# Patient Record
Sex: FEMALE | Race: WHITE | Employment: FULL TIME | URBAN - NONMETROPOLITAN AREA
[De-identification: names, ages, dates, MRNs, and addresses within clinical notes are randomized per-mention and may not be internally consistent; named-entity substitution may affect disease eponyms.]

---

## 2017-04-13 ENCOUNTER — OFFICE VISIT (OUTPATIENT)
Dept: INTERNAL MEDICINE | Age: 52
End: 2017-04-13
Payer: COMMERCIAL

## 2017-04-13 VITALS
WEIGHT: 172 LBS | SYSTOLIC BLOOD PRESSURE: 126 MMHG | BODY MASS INDEX: 29.37 KG/M2 | DIASTOLIC BLOOD PRESSURE: 72 MMHG | HEIGHT: 64 IN | HEART RATE: 84 BPM

## 2017-04-13 DIAGNOSIS — Z12.31 ENCOUNTER FOR MAMMOGRAM TO ESTABLISH BASELINE MAMMOGRAM: ICD-10-CM

## 2017-04-13 DIAGNOSIS — F33.1 MODERATE EPISODE OF RECURRENT MAJOR DEPRESSIVE DISORDER (HCC): Primary | ICD-10-CM

## 2017-04-13 DIAGNOSIS — Z99.89 OSA ON CPAP: ICD-10-CM

## 2017-04-13 DIAGNOSIS — D64.89 ANEMIA DUE TO OTHER CAUSE: ICD-10-CM

## 2017-04-13 DIAGNOSIS — E04.1 THYROID NODULE: ICD-10-CM

## 2017-04-13 DIAGNOSIS — G47.33 OSA ON CPAP: ICD-10-CM

## 2017-04-13 PROCEDURE — 1036F TOBACCO NON-USER: CPT | Performed by: INTERNAL MEDICINE

## 2017-04-13 PROCEDURE — G8427 DOCREV CUR MEDS BY ELIG CLIN: HCPCS | Performed by: INTERNAL MEDICINE

## 2017-04-13 PROCEDURE — 3014F SCREEN MAMMO DOC REV: CPT | Performed by: INTERNAL MEDICINE

## 2017-04-13 PROCEDURE — 3017F COLORECTAL CA SCREEN DOC REV: CPT | Performed by: INTERNAL MEDICINE

## 2017-04-13 PROCEDURE — G8419 CALC BMI OUT NRM PARAM NOF/U: HCPCS | Performed by: INTERNAL MEDICINE

## 2017-04-13 PROCEDURE — 99214 OFFICE O/P EST MOD 30 MIN: CPT | Performed by: INTERNAL MEDICINE

## 2017-04-13 RX ORDER — SERTRALINE HYDROCHLORIDE 100 MG/1
100 TABLET, FILM COATED ORAL DAILY
Qty: 90 TABLET | Refills: 3 | Status: SHIPPED | OUTPATIENT
Start: 2017-04-13 | End: 2018-04-30 | Stop reason: SDUPTHER

## 2017-04-13 ASSESSMENT — ENCOUNTER SYMPTOMS
DOUBLE VISION: 0
NAUSEA: 0
BLURRED VISION: 0
DIARRHEA: 0
CONSTIPATION: 0
WHEEZING: 0
ABDOMINAL PAIN: 0
EYE PAIN: 0
BLOOD IN STOOL: 0
EYE DISCHARGE: 0
SHORTNESS OF BREATH: 0
COUGH: 0
SORE THROAT: 0
VOMITING: 0

## 2017-04-13 ASSESSMENT — PATIENT HEALTH QUESTIONNAIRE - PHQ9
1. LITTLE INTEREST OR PLEASURE IN DOING THINGS: 0
SUM OF ALL RESPONSES TO PHQ QUESTIONS 1-9: 1
SUM OF ALL RESPONSES TO PHQ9 QUESTIONS 1 & 2: 1
2. FEELING DOWN, DEPRESSED OR HOPELESS: 1

## 2017-05-01 ENCOUNTER — TELEPHONE (OUTPATIENT)
Dept: GENERAL RADIOLOGY | Age: 52
End: 2017-05-01

## 2017-05-01 DIAGNOSIS — R92.8 ABNORMAL MAMMOGRAM: Primary | ICD-10-CM

## 2017-06-28 ENCOUNTER — OFFICE VISIT (OUTPATIENT)
Dept: PULMONOLOGY | Age: 52
End: 2017-06-28
Payer: COMMERCIAL

## 2017-06-28 VITALS
RESPIRATION RATE: 18 BRPM | BODY MASS INDEX: 28.34 KG/M2 | OXYGEN SATURATION: 98 % | HEIGHT: 64 IN | WEIGHT: 166 LBS | DIASTOLIC BLOOD PRESSURE: 80 MMHG | SYSTOLIC BLOOD PRESSURE: 114 MMHG | HEART RATE: 81 BPM

## 2017-06-28 DIAGNOSIS — Z99.89 OSA ON CPAP: Primary | ICD-10-CM

## 2017-06-28 DIAGNOSIS — K21.9 GASTROESOPHAGEAL REFLUX DISEASE WITHOUT ESOPHAGITIS: ICD-10-CM

## 2017-06-28 DIAGNOSIS — G47.33 OSA ON CPAP: Primary | ICD-10-CM

## 2017-06-28 PROCEDURE — 1036F TOBACCO NON-USER: CPT | Performed by: INTERNAL MEDICINE

## 2017-06-28 PROCEDURE — G8419 CALC BMI OUT NRM PARAM NOF/U: HCPCS | Performed by: INTERNAL MEDICINE

## 2017-06-28 PROCEDURE — G8427 DOCREV CUR MEDS BY ELIG CLIN: HCPCS | Performed by: INTERNAL MEDICINE

## 2017-06-28 PROCEDURE — 3017F COLORECTAL CA SCREEN DOC REV: CPT | Performed by: INTERNAL MEDICINE

## 2017-06-28 PROCEDURE — 3014F SCREEN MAMMO DOC REV: CPT | Performed by: INTERNAL MEDICINE

## 2017-06-28 PROCEDURE — 99204 OFFICE O/P NEW MOD 45 MIN: CPT | Performed by: INTERNAL MEDICINE

## 2017-06-28 RX ORDER — OMEPRAZOLE 20 MG/1
20 CAPSULE, DELAYED RELEASE ORAL DAILY
Qty: 30 CAPSULE | Refills: 2 | Status: SHIPPED | OUTPATIENT
Start: 2017-06-28 | End: 2017-11-22 | Stop reason: SDUPTHER

## 2017-07-21 ENCOUNTER — OFFICE VISIT (OUTPATIENT)
Dept: INTERNAL MEDICINE | Age: 52
End: 2017-07-21
Payer: COMMERCIAL

## 2017-07-21 VITALS
HEIGHT: 64 IN | DIASTOLIC BLOOD PRESSURE: 68 MMHG | HEART RATE: 60 BPM | WEIGHT: 170.6 LBS | SYSTOLIC BLOOD PRESSURE: 120 MMHG | BODY MASS INDEX: 29.12 KG/M2

## 2017-07-21 DIAGNOSIS — K21.9 GASTROESOPHAGEAL REFLUX DISEASE WITHOUT ESOPHAGITIS: ICD-10-CM

## 2017-07-21 DIAGNOSIS — E04.1 THYROID NODULE: Primary | ICD-10-CM

## 2017-07-21 DIAGNOSIS — G47.33 OSA ON CPAP: ICD-10-CM

## 2017-07-21 DIAGNOSIS — F33.1 MODERATE EPISODE OF RECURRENT MAJOR DEPRESSIVE DISORDER (HCC): ICD-10-CM

## 2017-07-21 DIAGNOSIS — Z99.89 OSA ON CPAP: ICD-10-CM

## 2017-07-21 PROCEDURE — G8419 CALC BMI OUT NRM PARAM NOF/U: HCPCS | Performed by: INTERNAL MEDICINE

## 2017-07-21 PROCEDURE — 3017F COLORECTAL CA SCREEN DOC REV: CPT | Performed by: INTERNAL MEDICINE

## 2017-07-21 PROCEDURE — 99213 OFFICE O/P EST LOW 20 MIN: CPT | Performed by: INTERNAL MEDICINE

## 2017-07-21 PROCEDURE — 3014F SCREEN MAMMO DOC REV: CPT | Performed by: INTERNAL MEDICINE

## 2017-07-21 PROCEDURE — 1036F TOBACCO NON-USER: CPT | Performed by: INTERNAL MEDICINE

## 2017-07-21 PROCEDURE — G8427 DOCREV CUR MEDS BY ELIG CLIN: HCPCS | Performed by: INTERNAL MEDICINE

## 2017-07-21 RX ORDER — TIZANIDINE 4 MG/1
4 TABLET ORAL 3 TIMES DAILY PRN
Qty: 90 TABLET | Refills: 1 | Status: SHIPPED | OUTPATIENT
Start: 2017-07-21 | End: 2017-11-22

## 2017-07-23 ASSESSMENT — ENCOUNTER SYMPTOMS
SHORTNESS OF BREATH: 0
NAUSEA: 0
DIARRHEA: 0
EYE PAIN: 0
VOMITING: 0
ABDOMINAL PAIN: 0
BLOOD IN STOOL: 0
SORE THROAT: 0
WHEEZING: 0
BLURRED VISION: 0
COUGH: 0
CONSTIPATION: 0
DOUBLE VISION: 0
EYE DISCHARGE: 0

## 2017-10-24 ENCOUNTER — OFFICE VISIT (OUTPATIENT)
Dept: PULMONOLOGY | Age: 52
End: 2017-10-24
Payer: COMMERCIAL

## 2017-10-24 VITALS
BODY MASS INDEX: 28.71 KG/M2 | WEIGHT: 168.2 LBS | HEART RATE: 70 BPM | SYSTOLIC BLOOD PRESSURE: 120 MMHG | OXYGEN SATURATION: 98 % | RESPIRATION RATE: 14 BRPM | HEIGHT: 64 IN | DIASTOLIC BLOOD PRESSURE: 88 MMHG | TEMPERATURE: 97.8 F

## 2017-10-24 DIAGNOSIS — G47.33 OSA ON CPAP: Primary | ICD-10-CM

## 2017-10-24 DIAGNOSIS — G47.19 EXCESSIVE DAYTIME SLEEPINESS: ICD-10-CM

## 2017-10-24 DIAGNOSIS — Z99.89 OSA ON CPAP: Primary | ICD-10-CM

## 2017-10-24 DIAGNOSIS — Z28.21 REFUSED INFLUENZA VACCINE: ICD-10-CM

## 2017-10-24 PROCEDURE — G8427 DOCREV CUR MEDS BY ELIG CLIN: HCPCS | Performed by: INTERNAL MEDICINE

## 2017-10-24 PROCEDURE — 3014F SCREEN MAMMO DOC REV: CPT | Performed by: INTERNAL MEDICINE

## 2017-10-24 PROCEDURE — G8484 FLU IMMUNIZE NO ADMIN: HCPCS | Performed by: INTERNAL MEDICINE

## 2017-10-24 PROCEDURE — 99214 OFFICE O/P EST MOD 30 MIN: CPT | Performed by: INTERNAL MEDICINE

## 2017-10-24 PROCEDURE — 3017F COLORECTAL CA SCREEN DOC REV: CPT | Performed by: INTERNAL MEDICINE

## 2017-10-24 PROCEDURE — 1036F TOBACCO NON-USER: CPT | Performed by: INTERNAL MEDICINE

## 2017-10-24 PROCEDURE — G8417 CALC BMI ABV UP PARAM F/U: HCPCS | Performed by: INTERNAL MEDICINE

## 2017-10-24 ASSESSMENT — SLEEP AND FATIGUE QUESTIONNAIRES
HOW LIKELY ARE YOU TO NOD OFF OR FALL ASLEEP WHILE WATCHING TV: 1
HOW LIKELY ARE YOU TO NOD OFF OR FALL ASLEEP WHEN YOU ARE A PASSENGER IN A CAR FOR AN HOUR WITHOUT A BREAK: 2
HOW LIKELY ARE YOU TO NOD OFF OR FALL ASLEEP WHILE LYING DOWN TO REST IN THE AFTERNOON WHEN CIRCUMSTANCES PERMIT: 1
HOW LIKELY ARE YOU TO NOD OFF OR FALL ASLEEP WHILE SITTING QUIETLY AFTER LUNCH WITHOUT ALCOHOL: 0
HOW LIKELY ARE YOU TO NOD OFF OR FALL ASLEEP WHILE SITTING AND READING: 1
HOW LIKELY ARE YOU TO NOD OFF OR FALL ASLEEP WHILE SITTING AND TALKING TO SOMEONE: 0
ESS TOTAL SCORE: 8
HOW LIKELY ARE YOU TO NOD OFF OR FALL ASLEEP WHILE SITTING INACTIVE IN A PUBLIC PLACE: 0
HOW LIKELY ARE YOU TO NOD OFF OR FALL ASLEEP IN A CAR, WHILE STOPPED FOR A FEW MINUTES IN TRAFFIC: 3

## 2017-10-24 NOTE — LETTER
[address]  Phone: [phone]  Fax: [phone]    Dequan Crisostomo MD        October 24, 2017     Jennifer Lipscomb MD  [address] [place] [address]    Patient: Fabrice Bazzi  MR Number: [id number]  YOB: 1965  Date of Visit: 10/24/2017    Dear Dr. Jennifer Lipscomb: Thank you for the request for consultation for Manasa Bryson to me for the evaluation of ***. Below are the relevant portions of my assessment and plan of care. If you have questions, please do not hesitate to call me. I look forward to following Rudolph Welsh along with you.     Sincerely,        Dequan Crisostomo MD

## 2017-10-24 NOTE — PROGRESS NOTES
REASON FOR THE CONSULTATION:  montez   HISTORY OF PRESENT ILLNESS:    Nba Starks is a 45y.o. year old female here for  Patient denies any aerophagia. Today, he has tolerated the CPAP at 8 cm of water pressure. Compliance data from 9/23/2017.-10/22/2017 shows 93.3% compliance and AHI is 4.9. She does feel fatigued and tired during the day, particularly while driving. She takes Zanaflex at night, but tells me that she only takes this intermittently. She is also on Zoloft 100 mg for depression. She gets up once at night to go to the bathroom. Her sleep time is 10 or 11 PM and wake time is 6 AM.  Sleep Medicine 10/24/2017   Sitting and reading 1   Watching TV 1   Sitting, inactive in a public place (e.g. a theatre or a meeting) 0   As a passenger in a car for an hour without a break 2   Lying down to rest in the afternoon when circumstances permit 1   Sitting and talking to someone 0   Sitting quietly after a lunch without alcohol 0   In a car, while stopped for a few minutes in traffic 3   Total score 8                Previous visit   evaluation of evaluation of MONTEZ. Patient's initial sleep study was done 11/1/2016 and was found to have an AHI of 17.8. She was titrated to a CPAP of 12 cm of water pressure with a nasal pillow. Even during the titration night. Patient noticed that she was bloated, had lot of distention of her abdomen and passing gas. At that time she was having a full face mask. Since December 7, 2016 when she had her titration night. Patient has been using the CPAP. Her provider is [employer]. Compliance is 76.7% age. Average usage is 4 hours and 49 minutes. This is set at a ramp the pressure setting is 5 cm of water at that time. Start pressure  She still complains of excessive bloating aerophagia and passing gas.   Has tried different strategies without success        She had the sleep study because she was tired during the day and was falling asleep at the week since ABLATION  12/17/13    HYSTEROSCOPY  7/2/13    Dr Baron Moe    HYSTEROSCOPY  12/17/13    WISDOM TOOTH EXTRACTION             SOCIAL AND OCCUPATIONAL HEALTH:      There  no history of TB or TB exposure. There  no asbestos or silica dust exposure. The patient reports  no coal, foundry, quarry or [employer] exposure. Travel history reveals no. There  no history of recreational or IV drug use. There  no hot tube exposure. Pets  no    Occupational history administered a persistent    TOBACCO:   reports that she has never smoked. She has never used smokeless tobacco.  ETOH:   reports that she drinks alcohol. ALLERGIES:      Allergies   Allergen Reactions    Aspirin Rash    Penicillins Swelling and Rash         Home Meds:   Prior to Admission medications    Medication Sig Start Date End Date Taking? Authorizing Provider   omeprazole (PRILOSEC) 20 MG delayed release capsule Take 1 capsule by mouth Daily 6/28/17  Yes Elin Boles MD   sertraline (ZOLOFT) 100 MG tablet Take 1 tablet by mouth daily 4/13/17  Yes Juan M Tellez MD   Multiple Vitamins-Minerals (MULTIVITAL PO) Take 1 tablet by mouth daily as needed    Yes Historical Provider, MD   Cholecalciferol (VITAMIN D) 2000 UNITS CAPS capsule Take 2,000 Units by mouth daily    Yes Historical Provider, MD   tiZANidine (ZANAFLEX) 4 MG tablet Take 1 tablet by mouth 3 times daily as needed (muscle spasms) 7/21/17   Juan M Tellez MD          Review of Systems -  General ROS: negative for - chills, fatigue, fever or weight loss  ENT ROS: negative for - headaches, oral lesions or sore throat  Cardiovascular ROS: no chest pain , orthopnea or pnd   Gastrointestinal ROS: no abdominal pain, change in bowel habits, or black or bloody stools  Skin - no rash   Neuro - no blurry vision , no loc .  No focal weakness   msk - no jt tenderness or swelling    Vascular - no claudication , rest completed and negative   Lymphatic - complete and negative   Hematology detail  Follow-up in 3 months with compliance date    Requested Prescriptions      No prescriptions requested or ordered in this encounter       There are no discontinued medications. Serjio Davis received counseling on the following healthy behaviors: nutrition, exercise and medication adherence            Discussed use, benefit, and side effects of prescribed medications. Barriers to medication compliance addressed. All patient questions answered. Pt voiced understanding. I hope this updates you on my evaluation and clinical thinking. Thank you for allowing me to participate in his care. Sincerely,      Raphael Crisostomo MD       Please note that this chart was generated using voice recognition Dragon dictation software. Although every effort was made to ensure the accuracy of this automated transcription, some errors in transcription may have occurred.

## 2017-11-22 ENCOUNTER — OFFICE VISIT (OUTPATIENT)
Dept: OBGYN | Age: 52
End: 2017-11-22
Payer: COMMERCIAL

## 2017-11-22 ENCOUNTER — HOSPITAL ENCOUNTER (OUTPATIENT)
Age: 52
Setting detail: SPECIMEN
Discharge: HOME | End: 2017-11-22
Payer: COMMERCIAL

## 2017-11-22 ENCOUNTER — OFFICE VISIT (OUTPATIENT)
Dept: INTERNAL MEDICINE | Age: 52
End: 2017-11-22
Payer: COMMERCIAL

## 2017-11-22 VITALS
HEART RATE: 80 BPM | BODY MASS INDEX: 28.51 KG/M2 | RESPIRATION RATE: 16 BRPM | DIASTOLIC BLOOD PRESSURE: 64 MMHG | WEIGHT: 167 LBS | HEIGHT: 64 IN | SYSTOLIC BLOOD PRESSURE: 112 MMHG

## 2017-11-22 VITALS
BODY MASS INDEX: 28.51 KG/M2 | SYSTOLIC BLOOD PRESSURE: 116 MMHG | HEART RATE: 84 BPM | WEIGHT: 167 LBS | HEIGHT: 64 IN | DIASTOLIC BLOOD PRESSURE: 76 MMHG

## 2017-11-22 DIAGNOSIS — Z01.419 WELL WOMAN EXAM WITH ROUTINE GYNECOLOGICAL EXAM: Primary | ICD-10-CM

## 2017-11-22 DIAGNOSIS — E04.1 THYROID NODULE: ICD-10-CM

## 2017-11-22 DIAGNOSIS — K21.9 GASTROESOPHAGEAL REFLUX DISEASE WITHOUT ESOPHAGITIS: ICD-10-CM

## 2017-11-22 DIAGNOSIS — F33.1 MODERATE EPISODE OF RECURRENT MAJOR DEPRESSIVE DISORDER (HCC): ICD-10-CM

## 2017-11-22 DIAGNOSIS — Z99.89 OSA ON CPAP: Primary | ICD-10-CM

## 2017-11-22 DIAGNOSIS — H61.91 SKIN LESION OF RIGHT EAR: ICD-10-CM

## 2017-11-22 DIAGNOSIS — Z01.419 WELL WOMAN EXAM WITH ROUTINE GYNECOLOGICAL EXAM: ICD-10-CM

## 2017-11-22 DIAGNOSIS — G47.33 OSA ON CPAP: Primary | ICD-10-CM

## 2017-11-22 PROCEDURE — 3014F SCREEN MAMMO DOC REV: CPT | Performed by: OBSTETRICS & GYNECOLOGY

## 2017-11-22 PROCEDURE — 3017F COLORECTAL CA SCREEN DOC REV: CPT | Performed by: OBSTETRICS & GYNECOLOGY

## 2017-11-22 PROCEDURE — 99396 PREV VISIT EST AGE 40-64: CPT | Performed by: OBSTETRICS & GYNECOLOGY

## 2017-11-22 PROCEDURE — G8427 DOCREV CUR MEDS BY ELIG CLIN: HCPCS | Performed by: OBSTETRICS & GYNECOLOGY

## 2017-11-22 PROCEDURE — 3014F SCREEN MAMMO DOC REV: CPT | Performed by: INTERNAL MEDICINE

## 2017-11-22 PROCEDURE — 1036F TOBACCO NON-USER: CPT | Performed by: INTERNAL MEDICINE

## 2017-11-22 PROCEDURE — 99214 OFFICE O/P EST MOD 30 MIN: CPT | Performed by: INTERNAL MEDICINE

## 2017-11-22 PROCEDURE — G0145 SCR C/V CYTO,THINLAYER,RESCR: HCPCS

## 2017-11-22 PROCEDURE — G8417 CALC BMI ABV UP PARAM F/U: HCPCS | Performed by: OBSTETRICS & GYNECOLOGY

## 2017-11-22 PROCEDURE — G8484 FLU IMMUNIZE NO ADMIN: HCPCS | Performed by: OBSTETRICS & GYNECOLOGY

## 2017-11-22 PROCEDURE — 3017F COLORECTAL CA SCREEN DOC REV: CPT | Performed by: INTERNAL MEDICINE

## 2017-11-22 PROCEDURE — G8427 DOCREV CUR MEDS BY ELIG CLIN: HCPCS | Performed by: INTERNAL MEDICINE

## 2017-11-22 PROCEDURE — 1036F TOBACCO NON-USER: CPT | Performed by: OBSTETRICS & GYNECOLOGY

## 2017-11-22 PROCEDURE — G8417 CALC BMI ABV UP PARAM F/U: HCPCS | Performed by: INTERNAL MEDICINE

## 2017-11-22 PROCEDURE — G8484 FLU IMMUNIZE NO ADMIN: HCPCS | Performed by: INTERNAL MEDICINE

## 2017-11-22 RX ORDER — MEDROXYPROGESTERONE ACETATE 2.5 MG/1
2.5 TABLET ORAL DAILY
Qty: 30 TABLET | Refills: 11 | Status: SHIPPED | OUTPATIENT
Start: 2017-11-22 | End: 2018-11-29 | Stop reason: SDUPTHER

## 2017-11-22 RX ORDER — ESTRADIOL 1 MG/1
1 TABLET ORAL DAILY
Qty: 30 TABLET | Refills: 11 | Status: SHIPPED | OUTPATIENT
Start: 2017-11-22 | End: 2018-11-29 | Stop reason: SDUPTHER

## 2017-11-22 ASSESSMENT — ENCOUNTER SYMPTOMS
EYES NEGATIVE: 1
RESPIRATORY NEGATIVE: 1

## 2017-11-22 NOTE — PROGRESS NOTES
Subjective:      Patient ID: Su Goldsmith is a 45 y.o. female. HPI  For yearly exam.  Has been having hot flashes and night sweats with assoc. Fatigue  For last yr. Hx of endometrial ablation in 2013 and hasnt bled since. Wants to discuss HRT. Hx of cryosurgery when young but all PAPs since have been normal  Had normal mammogram this spring. Review of Systems   Constitutional: Positive for fatigue. HENT: Negative. Eyes: Negative. Respiratory: Negative. Uses CPAP for apnea. Cardiovascular: Negative. Gastrointestinal:        Chronic gasiness   Endocrine: Negative. Genitourinary: Negative. Musculoskeletal: Negative. Neurological: Negative. Hematological: Negative. Psychiatric/Behavioral: Negative. Objective:   Physical Exam   Constitutional: She is oriented to person, place, and time. She appears well-developed. HENT:   Head: Normocephalic. Eyes: Pupils are equal, round, and reactive to light. Neck: Neck supple. No thyromegaly present. Cardiovascular: Normal rate, regular rhythm and normal heart sounds. No murmur heard. Pulmonary/Chest: Effort normal and breath sounds normal. She has no wheezes. She has no rales. Abdominal: Soft. She exhibits no mass. There is no tenderness. Genitourinary:   Genitourinary Comments: Breasts without mass or discharge. Normal perineum and vagina. Cervix without gross lesion. Os stenotic and S/C junction not seen  Uterus nl anteflexed. No mass or tenderness. Musculoskeletal: Normal range of motion. Lymphadenopathy:     She has no cervical adenopathy. Neurological: She is alert and oriented to person, place, and time. Skin: Skin is warm and dry. Psychiatric: She has a normal mood and affect. Assessment:      Symptomatic menopause      Plan:      Discussed pros and cons of HRT. Pt wants to try.   Estrace 1/Provera 2.5 daily

## 2017-11-24 RX ORDER — OMEPRAZOLE 20 MG/1
20 CAPSULE, DELAYED RELEASE ORAL DAILY
Qty: 90 CAPSULE | Refills: 3 | Status: SHIPPED | OUTPATIENT
Start: 2017-11-24 | End: 2018-12-19 | Stop reason: SDUPTHER

## 2017-11-24 NOTE — PROGRESS NOTES
Susan Prado  YOB: 1965    Date of Service:  11/22/2017      HPI:  Anel Franco was seen in the internal medicine office today for   Chief Complaint   Patient presents with    Sleep Apnea     4 mo follow up    Fatigue      · and continued evaluation and management of chronic medical problems. We reviewed the thyroid nodule issue and the ultrasound from July which looks stable. She has not had any symptoms here. The fatigue and sleep apnea have been a bigger issue and Dr. Magan Chaudhry is actively managing this. He had her stop her Zanaflex, slightly increase the CPAP and she has short-term followup. The mood has been reasonably good with the SSRI. She does have some interest potentially in seeing a counselor up in [facility] which I think would probably be helpful. With the reflux she uses the Prilosec through Pulmonary and has done well there. She has a new concern of a lesion on her right ear that she would like to have looked at. It is a little sensitive and slightly red. Review of Systems:  Constitutional:  Negative for chills, fever, and weight loss. Positive for fatigue. HENT:  Negative for congestion, ear pain, and sore throat. Eyes:  Negative for blurred vision, double vision, pain and discharge. Respiratory:  Negative for cough, shortness of breath, and wheezing. Cardiovascular:  Negative for chest pain, palpitations, and PND. Gastrointestinal:  Negative for abdominal pain, blood in stool, constipation, diarrhea, nausea and vomiting. Genitourinary:  Negative for dysuria, frequency, and hematuria. Musculoskeletal:  Negative for myalgias. Skin:  Negative for rash. Neurological:  Negative for tingling, sensory change, speech change, focal weakness, seizures, and headaches. Endo/Heme/Allergies:  Does not bruise/bleed easily. Psychiatric/Behavioral:  Negative for hallucinations and suicidal ideas.     Patient Active Problem List   Diagnosis    father. History   Smoking Status    Never Smoker   Smokeless Tobacco    Never Used     History   Alcohol Use    0.0 oz/week     Comment: socially       Physical Examination:  Constitutional:  She appears well-developed and well-nourished. No distress. HENT:  Head: Normocephalic and atraumatic. Right Ear:  External ear normal.  Left Ear:  External ear normal.  Nose:  Nose normal.  Mouth/Throat:  Oropharynx is clear and moist.  Eyes: Conjunctivae and EOM are normal.  Pupils are equal, round, and reactive to light. Right eye exhibits no discharge. Left eye exhibits no discharge. No scleral icterus. Neck:  Normal range of motion. Neck supple. No JVD present. No tracheal deviation present. No thyromegaly present. Cardiovascular:  Normal rate, normal heart sounds, and intact distal pulses. Exam reveals no gallop. Pulmonary/Chest:  Effort normal and breath sounds normal.  No respiratory distress. She has no wheezes. She has no rales. Abdominal:  Soft. Normal aorta and bowel sounds are normal.  She exhibits no distension and no mass. There is no hepatosplenomegaly. There is no tenderness. There is no rebound and no guarding. Musculoskeletal:  She exhibits no edema or tenderness. Lymphadenopathy:    She has no cervical adenopathy. Neurological:  She is alert. She has normal strength. She displays normal reflexes. No cranial nerve deficit or sensory deficit. She exhibits normal muscle tone. Skin:  Skin is warm and dry. No rash noted. She is not diaphoretic. No pallor. Slight area of erythema and scale at the pinna of the right ear. Psychiatric:  She has a normal mood and affect. Her behavior is normal.  Judgment normal.  Vitals reviewed. Vitals:    11/22/17 1451   BP: 116/76   Site: Left Arm   Position: Sitting   Cuff Size: Large Adult   Pulse: 84   Weight: 167 lb (75.8 kg)   Height: 5' 4\" (1.626 m)     Body mass index is 28.67 kg/m².      Wt Readings from Last 3 Encounters:   11/22/17 167 lb (75.8 kg)   11/22/17 167 lb (75.8 kg)   10/24/17 168 lb 3.2 oz (76.3 kg)     BP Readings from Last 3 Encounters:   11/22/17 116/76   11/22/17 112/64   10/24/17 120/88         Lab Results   Component Value Date    K 4.2 10/17/2015    CREATININE 0.72 10/17/2015    AST 15 10/17/2015    ALT 14 10/17/2015    TSH 1.73 09/26/2016    HCT 39.4 04/08/2017    GLUCOSE 98 04/08/2017    MG 2.2 08/17/2013    CALCIUM 10.3 10/17/2015      Lab Results   Component Value Date    CHOL 139 04/23/2016    TRIG 112 04/23/2016    HDL 64 04/23/2016    LDLCHOLESTEROL 53 04/23/2016       Assessment/Plan:    1. YOJANA on CPAP  She has increased her CPAP to 9. She stopped the Zanaflex. Will follow up with Pulmonary. 2. Thyroid nodule  Ultrasound reviewed from July 2017.  4 x 6 x 8 mm nodule noted. Will continue to monitor. Stable. 3. Moderate episode of recurrent major depressive disorder (HCC)  Doing well on the SSRI. No change. She is interested in potentially a referral to a counselor in [facility] and we will see if we can locate someone for her. This is a little outside of our normal range of referral but we will see what we can come up with for her up there. 4. Gastroesophageal reflux disease without esophagitis  Doing fine there with the Prilosec. This was initially started by Pulmonary. - omeprazole (PRILOSEC) 20 MG delayed release capsule; Take 1 capsule by mouth Daily  Dispense: 90 capsule; Refill: 3    5. Skin lesion of right ear  Dermatology referral.    She will call if any problems prior to return.     Orders Placed This Encounter   Medications    omeprazole (PRILOSEC) 20 MG delayed release capsule     Sig: Take 1 capsule by mouth Daily     Dispense:  90 capsule     Refill:  3        Troy BOWMAN am scribing for Sami Mosher MD 11/24/2017 at 1:03 PM.

## 2017-11-30 LAB — CYTOLOGY REPORT: NORMAL

## 2017-12-13 ENCOUNTER — OFFICE VISIT (OUTPATIENT)
Dept: PULMONOLOGY | Age: 52
End: 2017-12-13
Payer: COMMERCIAL

## 2017-12-13 VITALS
OXYGEN SATURATION: 98 % | WEIGHT: 169.6 LBS | RESPIRATION RATE: 16 BRPM | HEART RATE: 78 BPM | DIASTOLIC BLOOD PRESSURE: 70 MMHG | TEMPERATURE: 98.3 F | BODY MASS INDEX: 28.95 KG/M2 | HEIGHT: 64 IN | SYSTOLIC BLOOD PRESSURE: 106 MMHG

## 2017-12-13 DIAGNOSIS — G47.33 OSA ON CPAP: Primary | ICD-10-CM

## 2017-12-13 DIAGNOSIS — Z99.89 OSA ON CPAP: Primary | ICD-10-CM

## 2017-12-13 DIAGNOSIS — G47.19 EXCESSIVE DAYTIME SLEEPINESS: ICD-10-CM

## 2017-12-13 PROCEDURE — 3014F SCREEN MAMMO DOC REV: CPT | Performed by: NURSE PRACTITIONER

## 2017-12-13 PROCEDURE — G8484 FLU IMMUNIZE NO ADMIN: HCPCS | Performed by: NURSE PRACTITIONER

## 2017-12-13 PROCEDURE — 99213 OFFICE O/P EST LOW 20 MIN: CPT | Performed by: NURSE PRACTITIONER

## 2017-12-13 PROCEDURE — 1036F TOBACCO NON-USER: CPT | Performed by: NURSE PRACTITIONER

## 2017-12-13 PROCEDURE — G8417 CALC BMI ABV UP PARAM F/U: HCPCS | Performed by: NURSE PRACTITIONER

## 2017-12-13 PROCEDURE — G8427 DOCREV CUR MEDS BY ELIG CLIN: HCPCS | Performed by: NURSE PRACTITIONER

## 2017-12-13 PROCEDURE — 3017F COLORECTAL CA SCREEN DOC REV: CPT | Performed by: NURSE PRACTITIONER

## 2017-12-13 NOTE — PROGRESS NOTES
night.  Use humidifier if needed. Weight loss was recommended and discussed. Recommended following good sleep hygiene instructions. Given sleep hygiene instructions to the patient. Explained importance of compliance with treatment of sleep apnea. Compliance data was reviewed and the patient is in compliance per insurance requirement.     We'll see the patient back in 3 months      Neal Menjivar CNP             12/13/2017, 4:22 PM

## 2017-12-21 ENCOUNTER — TELEPHONE (OUTPATIENT)
Dept: INTERNAL MEDICINE | Age: 52
End: 2017-12-21

## 2017-12-21 DIAGNOSIS — Z99.89 OSA ON CPAP: Primary | ICD-10-CM

## 2017-12-21 DIAGNOSIS — G47.33 OSA ON CPAP: Primary | ICD-10-CM

## 2017-12-21 NOTE — TELEPHONE ENCOUNTER
More likely upper pressure needs to be changed, rather than lower, since if needs 8 now, machine will auto-adjust. Will change to 4-16.

## 2018-03-14 ENCOUNTER — OFFICE VISIT (OUTPATIENT)
Dept: PULMONOLOGY | Age: 53
End: 2018-03-14
Payer: COMMERCIAL

## 2018-03-14 VITALS
HEART RATE: 84 BPM | HEIGHT: 64 IN | BODY MASS INDEX: 29.12 KG/M2 | WEIGHT: 170.6 LBS | OXYGEN SATURATION: 98 % | RESPIRATION RATE: 14 BRPM | SYSTOLIC BLOOD PRESSURE: 114 MMHG | DIASTOLIC BLOOD PRESSURE: 70 MMHG

## 2018-03-14 DIAGNOSIS — Z99.89 OSA ON CPAP: Primary | ICD-10-CM

## 2018-03-14 DIAGNOSIS — Z28.21 REFUSED INFLUENZA VACCINE: ICD-10-CM

## 2018-03-14 DIAGNOSIS — G47.19 EXCESSIVE DAYTIME SLEEPINESS: ICD-10-CM

## 2018-03-14 DIAGNOSIS — G47.33 OSA ON CPAP: Primary | ICD-10-CM

## 2018-03-14 PROCEDURE — 99213 OFFICE O/P EST LOW 20 MIN: CPT | Performed by: NURSE PRACTITIONER

## 2018-03-14 ASSESSMENT — ENCOUNTER SYMPTOMS
EYES NEGATIVE: 1
GASTROINTESTINAL NEGATIVE: 1
ALLERGIC/IMMUNOLOGIC NEGATIVE: 1
RESPIRATORY NEGATIVE: 1

## 2018-04-19 DIAGNOSIS — Z12.31 SCREENING MAMMOGRAM, ENCOUNTER FOR: Primary | ICD-10-CM

## 2018-04-30 DIAGNOSIS — F33.1 MODERATE EPISODE OF RECURRENT MAJOR DEPRESSIVE DISORDER (HCC): ICD-10-CM

## 2018-04-30 RX ORDER — SERTRALINE HYDROCHLORIDE 100 MG/1
100 TABLET, FILM COATED ORAL DAILY
Qty: 90 TABLET | Refills: 3 | Status: SHIPPED | OUTPATIENT
Start: 2018-04-30 | End: 2019-04-20 | Stop reason: SDUPTHER

## 2018-05-25 ENCOUNTER — OFFICE VISIT (OUTPATIENT)
Dept: INTERNAL MEDICINE | Age: 53
End: 2018-05-25
Payer: COMMERCIAL

## 2018-05-25 ENCOUNTER — HOSPITAL ENCOUNTER (OUTPATIENT)
Dept: MAMMOGRAPHY | Age: 53
Discharge: HOME | End: 2018-05-27
Payer: COMMERCIAL

## 2018-05-25 VITALS
SYSTOLIC BLOOD PRESSURE: 112 MMHG | BODY MASS INDEX: 29.06 KG/M2 | WEIGHT: 170.2 LBS | HEIGHT: 64 IN | DIASTOLIC BLOOD PRESSURE: 60 MMHG | HEART RATE: 60 BPM

## 2018-05-25 DIAGNOSIS — Z12.31 SCREENING MAMMOGRAM, ENCOUNTER FOR: ICD-10-CM

## 2018-05-25 DIAGNOSIS — Z13.220 SCREENING FOR LIPOID DISORDERS: ICD-10-CM

## 2018-05-25 DIAGNOSIS — Z99.89 OSA ON CPAP: ICD-10-CM

## 2018-05-25 DIAGNOSIS — F33.1 MODERATE EPISODE OF RECURRENT MAJOR DEPRESSIVE DISORDER (HCC): Primary | ICD-10-CM

## 2018-05-25 DIAGNOSIS — Z13.1 SCREENING FOR DIABETES MELLITUS: ICD-10-CM

## 2018-05-25 DIAGNOSIS — E04.1 THYROID NODULE: ICD-10-CM

## 2018-05-25 DIAGNOSIS — K21.9 GASTROESOPHAGEAL REFLUX DISEASE WITHOUT ESOPHAGITIS: ICD-10-CM

## 2018-05-25 DIAGNOSIS — G47.33 OSA ON CPAP: ICD-10-CM

## 2018-05-25 PROCEDURE — 77067 SCR MAMMO BI INCL CAD: CPT

## 2018-05-25 PROCEDURE — G8427 DOCREV CUR MEDS BY ELIG CLIN: HCPCS | Performed by: INTERNAL MEDICINE

## 2018-05-25 PROCEDURE — G8417 CALC BMI ABV UP PARAM F/U: HCPCS | Performed by: INTERNAL MEDICINE

## 2018-05-25 PROCEDURE — 99214 OFFICE O/P EST MOD 30 MIN: CPT | Performed by: INTERNAL MEDICINE

## 2018-05-25 PROCEDURE — 1036F TOBACCO NON-USER: CPT | Performed by: INTERNAL MEDICINE

## 2018-05-25 PROCEDURE — 3017F COLORECTAL CA SCREEN DOC REV: CPT | Performed by: INTERNAL MEDICINE

## 2018-05-25 ASSESSMENT — PATIENT HEALTH QUESTIONNAIRE - PHQ9
SUM OF ALL RESPONSES TO PHQ9 QUESTIONS 1 & 2: 2
1. LITTLE INTEREST OR PLEASURE IN DOING THINGS: 1
2. FEELING DOWN, DEPRESSED OR HOPELESS: 1
SUM OF ALL RESPONSES TO PHQ QUESTIONS 1-9: 2

## 2018-05-29 DIAGNOSIS — R92.8 ABNORMAL MAMMOGRAM: Primary | ICD-10-CM

## 2018-06-01 ENCOUNTER — HOSPITAL ENCOUNTER (OUTPATIENT)
Dept: MAMMOGRAPHY | Age: 53
Discharge: HOME | End: 2018-06-03
Payer: COMMERCIAL

## 2018-06-01 ENCOUNTER — HOSPITAL ENCOUNTER (OUTPATIENT)
Dept: ULTRASOUND IMAGING | Age: 53
Discharge: HOME | End: 2018-06-03
Payer: COMMERCIAL

## 2018-06-01 DIAGNOSIS — R92.8 ABNORMAL MAMMOGRAM: ICD-10-CM

## 2018-06-01 PROCEDURE — G0279 TOMOSYNTHESIS, MAMMO: HCPCS

## 2018-06-01 PROCEDURE — 76642 ULTRASOUND BREAST LIMITED: CPT

## 2018-07-11 ENCOUNTER — HOSPITAL ENCOUNTER (OUTPATIENT)
Dept: INTERVENTIONAL RADIOLOGY/VASCULAR | Age: 53
Discharge: HOME | End: 2018-07-13
Payer: COMMERCIAL

## 2018-07-11 ENCOUNTER — OFFICE VISIT (OUTPATIENT)
Dept: PULMONOLOGY | Age: 53
End: 2018-07-11
Payer: COMMERCIAL

## 2018-07-11 VITALS
WEIGHT: 169 LBS | HEART RATE: 78 BPM | RESPIRATION RATE: 14 BRPM | OXYGEN SATURATION: 98 % | HEIGHT: 64 IN | DIASTOLIC BLOOD PRESSURE: 72 MMHG | SYSTOLIC BLOOD PRESSURE: 118 MMHG | BODY MASS INDEX: 28.85 KG/M2

## 2018-07-11 DIAGNOSIS — Z99.89 OSA ON CPAP: Primary | ICD-10-CM

## 2018-07-11 DIAGNOSIS — E04.1 THYROID NODULE: ICD-10-CM

## 2018-07-11 DIAGNOSIS — L23.7 POISON IVY: ICD-10-CM

## 2018-07-11 DIAGNOSIS — G47.33 OSA ON CPAP: Primary | ICD-10-CM

## 2018-07-11 PROCEDURE — G8417 CALC BMI ABV UP PARAM F/U: HCPCS | Performed by: NURSE PRACTITIONER

## 2018-07-11 PROCEDURE — 99214 OFFICE O/P EST MOD 30 MIN: CPT | Performed by: NURSE PRACTITIONER

## 2018-07-11 PROCEDURE — 3017F COLORECTAL CA SCREEN DOC REV: CPT | Performed by: NURSE PRACTITIONER

## 2018-07-11 PROCEDURE — G8427 DOCREV CUR MEDS BY ELIG CLIN: HCPCS | Performed by: NURSE PRACTITIONER

## 2018-07-11 PROCEDURE — 76536 US EXAM OF HEAD AND NECK: CPT

## 2018-07-11 PROCEDURE — 1036F TOBACCO NON-USER: CPT | Performed by: NURSE PRACTITIONER

## 2018-07-11 RX ORDER — DESOXIMETASONE 2.5 MG/G
CREAM TOPICAL
Qty: 30 G | Refills: 0 | Status: SHIPPED | OUTPATIENT
Start: 2018-07-11 | End: 2018-09-07 | Stop reason: ALTCHOICE

## 2018-07-11 RX ORDER — PREDNISONE 10 MG/1
40 TABLET ORAL DAILY
Qty: 20 TABLET | Refills: 0 | Status: SHIPPED | OUTPATIENT
Start: 2018-07-11 | End: 2018-07-16

## 2018-07-11 NOTE — PROGRESS NOTES
Rfl:     Cholecalciferol (VITAMIN D) 2000 UNITS CAPS capsule, Take 2,000 Units by mouth daily , Disp: , Rfl:       Objective:    Physical Exam:  Vitals: /72 (Site: Right Arm, Position: Sitting, Cuff Size: Large Adult)   Pulse 78   Resp 14   Ht 5' 4\" (1.626 m)   Wt 169 lb (76.7 kg)   SpO2 98%   BMI 29.01 kg/m²   Last 3 weights: Wt Readings from Last 3 Encounters:   07/11/18 169 lb (76.7 kg)   05/25/18 170 lb 3.2 oz (77.2 kg)   03/14/18 170 lb 9.6 oz (77.4 kg)     Body mass index is 29.01 kg/m². Physical Examination:   Constitutional: Appears well, no distress  EENT: PERRLA,  sclera clear, anicteric, oropharynx clear, no lesions, neck supple with midline trachea. Neck: Supple, symmetrical, trachea midline, no adenopathy, no goiter, no jvd skin normal  Respiratory: clear to auscultation, no wheezes or rales and unlabored breathing. No intercostal tenderness  Cardiovascular: regular rate and rhythm, normal S1, S2, no murmur noted  Abdomen: soft, nontender, nondistended, no masses or organomegaly  Extremities:  Red, raised, inflamed rash to left forearm/elbow; right neck that extends around and through hairline and midsternal chest.       Assessment:     Diagnosis Orders   1. YOJANA on CPAP  DME Order for CPAP as OP   2. Poison ivy  desoximetasone (TOPICORT) 0.25 % cream    predniSONE (DELTASONE) 10 MG tablet         PLAN:    Topicort cream to affected areas 2/2 poison ivy  Prednisone burst 2/2 poison ivy   Educated and clarified the medication use. Recommend flu vaccination in the fall annually - refused. Recommendations given regarding pneumococcal vaccinations - refused. Patient is not up-to-date with vaccinations from pulmonary perspective. Maintain an active lifestyle. Questions  Patient had were answered to his/her satisfaction. CPAP to be used for at least 4 hours every night. Use humidifier if needed. Weight loss was recommended and discussed.   Recommended following good sleep hygiene instructions. Given sleep hygiene instructions to the patient. Explained importance of compliance with treatment of sleep apnea. Compliance data was reviewed and the patient is in compliance per insurance requirement.     We'll see the patient back in 12 months      Orin Hernandes CNP             7/11/2018, 4:50 PM

## 2018-09-01 ENCOUNTER — HOSPITAL ENCOUNTER (EMERGENCY)
Age: 53
Discharge: HOME | End: 2018-09-02
Attending: EMERGENCY MEDICINE
Payer: COMMERCIAL

## 2018-09-01 VITALS
SYSTOLIC BLOOD PRESSURE: 143 MMHG | HEART RATE: 85 BPM | WEIGHT: 170 LBS | DIASTOLIC BLOOD PRESSURE: 95 MMHG | TEMPERATURE: 97.5 F | RESPIRATION RATE: 16 BRPM | BODY MASS INDEX: 29.18 KG/M2 | OXYGEN SATURATION: 98 %
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
## 2018-09-01 PROCEDURE — 90675 RABIES VACCINE IM: CPT | Performed by: EMERGENCY MEDICINE

## 2018-09-01 PROCEDURE — 2500000003 HC RX 250 WO HCPCS: Performed by: EMERGENCY MEDICINE

## 2018-09-01 PROCEDURE — 90471 IMMUNIZATION ADMIN: CPT | Performed by: EMERGENCY MEDICINE

## 2018-09-01 PROCEDURE — 6360000002 HC RX W HCPCS: Performed by: EMERGENCY MEDICINE

## 2018-09-01 PROCEDURE — 6370000000 HC RX 637 (ALT 250 FOR IP): Performed by: EMERGENCY MEDICINE

## 2018-09-01 PROCEDURE — 99283 EMERGENCY DEPT VISIT LOW MDM: CPT

## 2018-09-01 PROCEDURE — 96372 THER/PROPH/DIAG INJ SC/IM: CPT

## 2018-09-01 RX ORDER — DOXYCYCLINE 100 MG/1
100 CAPSULE ORAL ONCE
Status: COMPLETED | OUTPATIENT
Start: 2018-09-01 | End: 2018-09-01

## 2018-09-01 RX ORDER — DOXYCYCLINE 100 MG/1
100 TABLET ORAL 2 TIMES DAILY
Qty: 20 TABLET | Refills: 0 | Status: SHIPPED | OUTPATIENT
Start: 2018-09-01 | End: 2018-09-10

## 2018-09-01 RX ADMIN — RABIES IMMUNE GLOBULIN (HUMAN) 1545 UNITS: 300 INJECTION, SOLUTION INFILTRATION; INTRAMUSCULAR at 23:27

## 2018-09-01 RX ADMIN — DOXYCYCLINE 100 MG: 100 CAPSULE ORAL at 23:20

## 2018-09-01 RX ADMIN — RABIES VACCINE 1 ML: KIT at 23:21

## 2018-09-01 ASSESSMENT — ENCOUNTER SYMPTOMS
RESPIRATORY NEGATIVE: 1
EYES NEGATIVE: 1
GASTROINTESTINAL NEGATIVE: 1

## 2018-09-02 NOTE — ED PROVIDER NOTES
place, and time. She appears well-developed and well-nourished. HENT:   Head: Normocephalic and atraumatic. Eyes: Pupils are equal, round, and reactive to light. EOM are normal.   Neck: Neck supple. Cardiovascular: Normal rate and regular rhythm. Pulmonary/Chest: Effort normal and breath sounds normal.   Abdominal: Soft. Bowel sounds are normal.   Musculoskeletal: Normal range of motion. Bite wound is noted at the tip of the index finger there is some erythema noted there but no sign of any cellulitis or any gross infection. Neurological: She is alert and oriented to person, place, and time. Skin: Skin is warm and dry. Capillary refill takes 2 to 3 seconds. Psychiatric: She has a normal mood and affect. Vitals reviewed. DIFFERENTIAL DIAGNOSIS/ MDM:         DIAGNOSTIC RESULTS     EKG: All EKG's are interpreted by the Emergency Department Physician who either signs or Co-signs this chart in the absence of a cardiologist.        Not indicated unless otherwise documented above    LABS:  No results found for this visit on 09/01/18.     Not indicated unless otherwise documented above    RADIOLOGY:   I reviewed the radiologist interpretations:  No orders to display       Not indicated unless otherwise documented above    EMERGENCY DEPARTMENT COURSE:     The patient was given the following medications:  Orders Placed This Encounter   Medications    doxycycline monohydrate (MONODOX) capsule 100 mg    rabies immune globulin (HYPERRAB) 1500 UNIT/5ML injection 1,545 Units    rabies vaccine, PCEC (RABAVERT) injection 1 mL    doxycycline monohydrate (ADOXA) 100 MG tablet     Sig: Take 1 tablet by mouth 2 times daily for 10 days     Dispense:  20 tablet     Refill:  0        Vitals:    Vitals:    09/01/18 2203 09/01/18 2236   BP: (!) 143/95    Pulse: 85    Resp: 16    Temp: 97.5 °F (36.4 °C)    TempSrc: Tympanic    SpO2: 98%    Weight:  77.1 kg (170 lb)     -------------------------  BP (!) 143/95 Pulse 85   Temp 97.5 °F (36.4 °C) (Tympanic)   Resp 16   Wt 77.1 kg (170 lb)   SpO2 98%   BMI 29.18 kg/m²         I have reviewed the disposition diagnosis with the patient and or their family/guardian. I have answered their questions and given discharge instructions. They voiced understanding of these instructions and did not have any further questions or complaints. CRITICAL CARE:    None    CONSULTS:    None    PROCEDURES:    None      OARRS Report if indicated             FINAL IMPRESSION      1.  Cat bite, initial encounter          DISPOSITION/PLAN   DISPOSITION Decision To Discharge      PATIENT REFERRED TO:  Maggie Castro MD  [address] [place] [address]  [phone]    In 2 days        DISCHARGE MEDICATIONS:  Discharge Medication List as of 9/1/2018 11:28 PM      START taking these medications    Details   doxycycline monohydrate (ADOXA) 100 MG tablet Take 1 tablet by mouth 2 times daily for 10 days, Disp-20 tablet, R-0Print             (Please note that portions of this note were completed with a voice recognition program.  Efforts were made to edit the dictations but occasionally words are mis-transcribed.)    Aranda MD  Attending Emergency Physician              Compa Noel MD  09/03/18 [address] [place] [address] III, MD  09/07/18 [id number]

## 2018-09-04 ENCOUNTER — TELEPHONE (OUTPATIENT)
Dept: INTERNAL MEDICINE | Age: 53
End: 2018-09-04

## 2018-09-07 ENCOUNTER — HOSPITAL ENCOUNTER (OUTPATIENT)
Dept: INFUSION THERAPY | Age: 53
Setting detail: INFUSION SERIES
Discharge: HOME | End: 2018-09-07
Payer: COMMERCIAL

## 2018-09-07 VITALS
TEMPERATURE: 98.1 F | OXYGEN SATURATION: 100 % | RESPIRATION RATE: 14 BRPM | SYSTOLIC BLOOD PRESSURE: 135 MMHG | DIASTOLIC BLOOD PRESSURE: 85 MMHG | HEART RATE: 86 BPM

## 2018-09-07 DIAGNOSIS — S61.259D ANIMAL BITE OF FINGER, SUBSEQUENT ENCOUNTER: ICD-10-CM

## 2018-09-07 PROBLEM — S61.259A ANIMAL BITE OF FINGER: Status: ACTIVE | Noted: 2018-09-07

## 2018-09-07 PROCEDURE — 90471 IMMUNIZATION ADMIN: CPT | Performed by: INTERNAL MEDICINE

## 2018-09-07 PROCEDURE — 6360000002 HC RX W HCPCS: Performed by: INTERNAL MEDICINE

## 2018-09-07 PROCEDURE — 90675 RABIES VACCINE IM: CPT | Performed by: INTERNAL MEDICINE

## 2018-09-07 RX ADMIN — RABIES VACCINE 1 ML: KIT at 08:28

## 2018-09-10 ENCOUNTER — TELEPHONE (OUTPATIENT)
Dept: INTERNAL MEDICINE | Age: 53
End: 2018-09-10

## 2018-09-10 ENCOUNTER — OFFICE VISIT (OUTPATIENT)
Dept: INTERNAL MEDICINE | Age: 53
End: 2018-09-10
Payer: COMMERCIAL

## 2018-09-10 VITALS
SYSTOLIC BLOOD PRESSURE: 122 MMHG | WEIGHT: 174 LBS | DIASTOLIC BLOOD PRESSURE: 66 MMHG | HEIGHT: 64 IN | HEART RATE: 84 BPM | BODY MASS INDEX: 29.71 KG/M2

## 2018-09-10 DIAGNOSIS — E04.1 THYROID NODULE: ICD-10-CM

## 2018-09-10 DIAGNOSIS — Z99.89 OSA ON CPAP: ICD-10-CM

## 2018-09-10 DIAGNOSIS — F33.1 MODERATE EPISODE OF RECURRENT MAJOR DEPRESSIVE DISORDER (HCC): ICD-10-CM

## 2018-09-10 DIAGNOSIS — Z92.29 HISTORY OF RABIES VACCINATION: ICD-10-CM

## 2018-09-10 DIAGNOSIS — G47.33 OSA ON CPAP: ICD-10-CM

## 2018-09-10 DIAGNOSIS — Z88.9 ALLERGY, DRUG: ICD-10-CM
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
## 2018-09-10 DIAGNOSIS — K21.9 GASTROESOPHAGEAL REFLUX DISEASE WITHOUT ESOPHAGITIS: ICD-10-CM

## 2018-09-10 DIAGNOSIS — L50.9 URTICARIA: ICD-10-CM

## 2018-09-10 PROCEDURE — 99214 OFFICE O/P EST MOD 30 MIN: CPT | Performed by: INTERNAL MEDICINE

## 2018-09-10 PROCEDURE — 3017F COLORECTAL CA SCREEN DOC REV: CPT | Performed by: INTERNAL MEDICINE

## 2018-09-10 PROCEDURE — G8417 CALC BMI ABV UP PARAM F/U: HCPCS | Performed by: INTERNAL MEDICINE

## 2018-09-10 PROCEDURE — 1036F TOBACCO NON-USER: CPT | Performed by: INTERNAL MEDICINE

## 2018-09-10 PROCEDURE — G8427 DOCREV CUR MEDS BY ELIG CLIN: HCPCS | Performed by: INTERNAL MEDICINE

## 2018-09-10 NOTE — TELEPHONE ENCOUNTER
Patient is having an allergic reaction to either the rabies vaccine or the doxycyline. She has hives all over her body. These appeared Sunday afternoon. Her last rabies vaccine was on Friday.

## 2018-09-11 ENCOUNTER — HOSPITAL ENCOUNTER (OUTPATIENT)
Dept: INFUSION THERAPY | Age: 53
Setting detail: INFUSION SERIES
Discharge: HOME | End: 2018-09-11
Payer: COMMERCIAL

## 2018-09-11 VITALS
TEMPERATURE: 98.7 F | RESPIRATION RATE: 14 BRPM | OXYGEN SATURATION: 98 % | DIASTOLIC BLOOD PRESSURE: 70 MMHG | SYSTOLIC BLOOD PRESSURE: 116 MMHG | HEART RATE: 93 BPM

## 2018-09-11 DIAGNOSIS — S61.259D ANIMAL BITE OF FINGER, SUBSEQUENT ENCOUNTER: ICD-10-CM

## 2018-09-11 PROCEDURE — 6360000002 HC RX W HCPCS

## 2018-09-11 PROCEDURE — 90471 IMMUNIZATION ADMIN: CPT

## 2018-09-11 PROCEDURE — 90675 RABIES VACCINE IM: CPT

## 2018-09-11 RX ADMIN — RABIES VACCINE 1 ML: KIT at 19:13

## 2018-09-13 NOTE — PROGRESS NOTES
Mary Esquivel  YOB: 1965    Date of Service:  9/10/2018      HPI:  Holland Diehl was seen in the internal medicine office today for:  Chief Complaint  Patient presents with  · Rash. · Cat bite. · Concern for rabies exposure. · Thyroid problem. · Sleep apnea. · Depression. · and continued evaluation and management of chronic medical problems. We addressed the following new, acute or uncontrolled/unstable issues:    She was bitten by a cat. This was an 6week old kitten. This was going into the holiday weekend, . She was seen in the ER. The story is a little unusual.  The kitten was injured, apparently had maggots on a wound on its leg. The mother was ignoring the cat for reasons that were unclear. Rosa Bubbaalonso thought the kitten was deaf because it did not respond when she came up behind it on a mower but then when it saw her it ran. It still seemed to be pretty vital despite the wound on the leg and it was unclear where that came from. She is used to being around barn cats her whole life and the cat still looked healthy she thought, was not really acting funny. She picked it up out of compassion and was going to take it to the humane society as the mother was ignoring it. As she approached her house she was carrying it by the fur and it bit her on the finger. She dropped it on the sidewalk about 2 or 3 feet, it ran under the porch. A few hours later it had . She did not think the fall really would have killed it. It is unclear what was going on with the cat. They contacted the health commissioner. Because of the holiday weekend they did not think they could get the cat down to [facility] to have it looked at for rabies. After apparently extensive consultation with the ER doctor, they decided to administer immunoglobulin in the ER and she had multiple vaccines in the finger. She also had the first dose of the rabies vaccine.   Subsequently, she has had the wheezing. Cardiovascular:  Negative for chest pain, palpitations, and PND. Gastrointestinal:  Negative for abdominal pain, blood in stool, constipation, diarrhea, nausea and vomiting. Genitourinary:  Negative for dysuria, frequency, and hematuria. Musculoskeletal:  Negative for myalgias. Skin:  Negative for rash. Neurological:  Negative for tingling, sensory change, speech change, focal weakness, seizures, and headaches. Endo/Heme/Allergies:  Does not bruise/bleed easily. Psychiatric/Behavioral:  Negative for hallucinations and suicidal ideas. Patient Active Problem List   Diagnosis    GERD (gastroesophageal reflux disease)    Fatigue    Allergic rhinitis    Low back pain    Other disorder of menstruation and other abnormal bleeding from female genital tract    Depression    YOJANA on CPAP    Thyroid nodule    Animal bite of finger       Medications:    Current Outpatient Prescriptions   Medication Sig Dispense Refill    sertraline (ZOLOFT) 100 MG tablet Take 1 tablet by mouth daily 90 tablet 3    omeprazole (PRILOSEC) 20 MG delayed release capsule Take 1 capsule by mouth Daily 90 capsule 3    estradiol (ESTRACE) 1 MG tablet Take 1 tablet by mouth daily 30 tablet 11    medroxyPROGESTERone (PROVERA) 2.5 MG tablet Take 1 tablet by mouth daily 30 tablet 11    Multiple Vitamins-Minerals (MULTIVITAL PO) Take 1 tablet by mouth daily as needed       Cholecalciferol (VITAMIN D) 2000 UNITS CAPS capsule Take 2,000 Units by mouth daily        No current facility-administered medications for this visit.         Past Medical History:        Diagnosis Date    Allergic rhinitis     Anxiety     Originally 1990's, with panic    Chronic tension headaches     occasional    Depression     Zoloft started 1990's    DUB (dysfunctional uterine bleeding)     ablation    Fatigue     GERD (gastroesophageal reflux disease)     No prior scope as of PE 5/9/16- mild sx over yrs managed without meds    Hepatitis A normal and breath sounds normal.  No respiratory distress. She has no wheezes. She has no rales. Abdominal:  Soft. Normal aorta and bowel sounds are normal.  She exhibits no distension and no mass. There is no hepatosplenomegaly. There is no tenderness. There is no rebound and no guarding. Musculoskeletal:  She exhibits no edema or tenderness. The finger wound is healing very nicely. There is no erythema. No fluctuance. It is a small puncture wound it appears from the tooth and it has completely closed. There is no streaking erythema on the arm. No lymphadenopathy. Lymphadenopathy:    She has no cervical adenopathy. Neurological:  She is alert. She has normal strength. She displays normal reflexes. No cranial nerve deficit or sensory deficit. She exhibits normal muscle tone. Skin:  Skin is warm and dry. There are erythematous raised areas several centimeters across on the arms and scalp. She is not diaphoretic. No pallor. Psychiatric:  She has a normal mood and affect. Her behavior is normal.  Judgment normal.       Lab Results   Component Value Date    K 4.2 10/17/2015    CREATININE 0.72 10/17/2015    AST 15 10/17/2015    ALT 14 10/17/2015    TSH 1.73 09/26/2016    HCT 39.4 04/08/2017    GLUCOSE 98 04/08/2017    MG 2.2 08/17/2013    CALCIUM 10.3 10/17/2015      Lab Results   Component Value Date    CHOL 139 04/23/2016    TRIG 112 04/23/2016    HDL 64 04/23/2016    LDLCHOLESTEROL 53 04/23/2016       Assessment/Plan:    1. Cat bite, subsequent encounter  2. History of rabies vaccination  3. Urticaria  4. Allergy, drug  This is probably from the doxycycline. Stop the doxycycline. Use Zyrtec 10 mg daily starting today. Use Zantac 150 mg BID. We reviewed this in detail. I think at this point I would finish the rabies series. She otherwise had not had issue with the vaccine. She found the administration of the immunoglobulin unpleasant, however.   It is possible the rash could be from the rabies vaccine but I think more likely from the doxycycline. As the next shot is due tomorrow I would suggest she go ahead with that and we observe the rash off the doxycycline and with the antihistamine treatment as described above. As long as she is seeing improvement over the next few days I think she could go ahead with the 4th injection next week. 5. Moderate episode of recurrent major depressive disorder (HCC)  Stable. Continue to monitor. Stable on Zoloft. 6. YOJANA on CPAP  Stable. Continue to monitor. 7. Thyroid nodule  With the thyroid nodule she does not require any further followup. The ultrasound was reassuring. 8. Gastroesophageal reflux disease without esophagitis  Stable. Continue to monitor. 9.  Lipids and diabetic screening pending. She will call if any problems prior to return. No orders of the defined types were placed in this encounter.        Yuridia Murphy am scribing for Chelsea King MD 9/13/2018 at 8:05 AM.

## 2018-09-14 ENCOUNTER — TELEPHONE (OUTPATIENT)
Dept: INTERNAL MEDICINE | Age: 53
End: 2018-09-14

## 2018-09-14 NOTE — TELEPHONE ENCOUNTER
Zantac wouldn't typically cause heartburn--is she stll on the Prilosec? Zantac was in addition too--possible she stopped. How are hives?

## 2018-09-18 ENCOUNTER — HOSPITAL ENCOUNTER (OUTPATIENT)
Dept: INFUSION THERAPY | Age: 53
Setting detail: INFUSION SERIES
Discharge: HOME | End: 2018-09-18
Payer: COMMERCIAL

## 2018-09-18 VITALS
TEMPERATURE: 98.2 F | HEART RATE: 84 BPM | SYSTOLIC BLOOD PRESSURE: 130 MMHG | RESPIRATION RATE: 16 BRPM | DIASTOLIC BLOOD PRESSURE: 98 MMHG

## 2018-09-18 DIAGNOSIS — S61.259D ANIMAL BITE OF FINGER, SUBSEQUENT ENCOUNTER: ICD-10-CM

## 2018-09-18 PROCEDURE — 90675 RABIES VACCINE IM: CPT | Performed by: INTERNAL MEDICINE

## 2018-09-18 PROCEDURE — 90471 IMMUNIZATION ADMIN: CPT | Performed by: INTERNAL MEDICINE

## 2018-09-18 PROCEDURE — 6360000002 HC RX W HCPCS: Performed by: INTERNAL MEDICINE

## 2018-09-18 RX ADMIN — RABIES VACCINE 1 ML: KIT at 22:16

## 2018-11-24 ENCOUNTER — HOSPITAL ENCOUNTER (OUTPATIENT)
Dept: LAB | Age: 53
Discharge: HOME | End: 2018-11-24
Payer: COMMERCIAL

## 2018-11-24 DIAGNOSIS — Z13.1 SCREENING FOR DIABETES MELLITUS: ICD-10-CM

## 2018-11-24 DIAGNOSIS — Z13.220 SCREENING FOR LIPOID DISORDERS: ICD-10-CM

## 2018-11-24 LAB
ANION GAP SERPL CALCULATED.3IONS-SCNC: 11 MMOL/L (ref 9–17)
BUN BLDV-MCNC: 14 MG/DL (ref 6–20)
BUN/CREAT BLD: 20 (ref 9–20)
CALCIUM SERPL-MCNC: 9.2 MG/DL (ref 8.6–10.4)
CHLORIDE BLD-SCNC: 102 MMOL/L (ref 98–107)
CHOLESTEROL/HDL RATIO: 2.2
CHOLESTEROL: 123 MG/DL
CO2: 27 MMOL/L (ref 20–31)
CREAT SERPL-MCNC: 0.71 MG/DL (ref 0.5–0.9)
GFR AFRICAN AMERICAN: >60 ML/MIN
GFR NON-AFRICAN AMERICAN: >60 ML/MIN
GFR SERPL CREATININE-BSD FRML MDRD: NORMAL ML/MIN/{1.73_M2}
GFR SERPL CREATININE-BSD FRML MDRD: NORMAL ML/MIN/{1.73_M2}
GLUCOSE BLD-MCNC: 88 MG/DL (ref 70–99)
HDLC SERPL-MCNC: 57 MG/DL
LDL CHOLESTEROL: 35 MG/DL (ref 0–130)
POTASSIUM SERPL-SCNC: 3.8 MMOL/L (ref 3.7–5.3)
SODIUM BLD-SCNC: 140 MMOL/L (ref 135–144)
TRIGL SERPL-MCNC: 155 MG/DL
VLDLC SERPL CALC-MCNC: ABNORMAL MG/DL (ref 1–30)

## 2018-11-24 PROCEDURE — 36415 COLL VENOUS BLD VENIPUNCTURE: CPT

## 2018-11-24 PROCEDURE — 80061 LIPID PANEL: CPT

## 2018-11-24 PROCEDURE — 80048 BASIC METABOLIC PNL TOTAL CA: CPT

## 2018-11-29 ENCOUNTER — OFFICE VISIT (OUTPATIENT)
Dept: INTERNAL MEDICINE | Age: 53
End: 2018-11-29
Payer: COMMERCIAL

## 2018-11-29 ENCOUNTER — HOSPITAL ENCOUNTER (OUTPATIENT)
Age: 53
Setting detail: SPECIMEN
Discharge: HOME | End: 2018-11-29
Payer: COMMERCIAL

## 2018-11-29 ENCOUNTER — OFFICE VISIT (OUTPATIENT)
Dept: OBGYN | Age: 53
End: 2018-11-29
Payer: COMMERCIAL

## 2018-11-29 VITALS
WEIGHT: 173 LBS | SYSTOLIC BLOOD PRESSURE: 126 MMHG | DIASTOLIC BLOOD PRESSURE: 76 MMHG | HEART RATE: 84 BPM | BODY MASS INDEX: 29.53 KG/M2 | HEIGHT: 64 IN

## 2018-11-29 VITALS
WEIGHT: 173 LBS | SYSTOLIC BLOOD PRESSURE: 110 MMHG | BODY MASS INDEX: 29.53 KG/M2 | DIASTOLIC BLOOD PRESSURE: 78 MMHG | HEIGHT: 64 IN | HEART RATE: 86 BPM

## 2018-11-29 DIAGNOSIS — Z99.89 OSA ON CPAP: ICD-10-CM

## 2018-11-29 DIAGNOSIS — Z12.31 VISIT FOR SCREENING MAMMOGRAM: ICD-10-CM

## 2018-11-29 DIAGNOSIS — Z01.419 WELL FEMALE EXAM WITH ROUTINE GYNECOLOGICAL EXAM: Primary | ICD-10-CM

## 2018-11-29 DIAGNOSIS — F33.1 MODERATE EPISODE OF RECURRENT MAJOR DEPRESSIVE DISORDER (HCC): Primary | ICD-10-CM

## 2018-11-29 DIAGNOSIS — K21.9 GASTROESOPHAGEAL REFLUX DISEASE WITHOUT ESOPHAGITIS: ICD-10-CM

## 2018-11-29 DIAGNOSIS — Z80.3 FAMILY HISTORY OF BREAST CANCER: ICD-10-CM

## 2018-11-29 DIAGNOSIS — Z12.4 SCREENING FOR CERVICAL CANCER: ICD-10-CM

## 2018-11-29 DIAGNOSIS — M67.40 GANGLION CYST: ICD-10-CM

## 2018-11-29 DIAGNOSIS — G47.33 OSA ON CPAP: ICD-10-CM

## 2018-11-29 PROCEDURE — 99386 PREV VISIT NEW AGE 40-64: CPT | Performed by: NURSE PRACTITIONER

## 2018-11-29 PROCEDURE — G8484 FLU IMMUNIZE NO ADMIN: HCPCS | Performed by: NURSE PRACTITIONER

## 2018-11-29 PROCEDURE — 3017F COLORECTAL CA SCREEN DOC REV: CPT | Performed by: INTERNAL MEDICINE

## 2018-11-29 PROCEDURE — G8417 CALC BMI ABV UP PARAM F/U: HCPCS | Performed by: INTERNAL MEDICINE

## 2018-11-29 PROCEDURE — G8427 DOCREV CUR MEDS BY ELIG CLIN: HCPCS | Performed by: INTERNAL MEDICINE

## 2018-11-29 PROCEDURE — 99213 OFFICE O/P EST LOW 20 MIN: CPT | Performed by: INTERNAL MEDICINE

## 2018-11-29 PROCEDURE — 1036F TOBACCO NON-USER: CPT | Performed by: INTERNAL MEDICINE

## 2018-11-29 PROCEDURE — G0145 SCR C/V CYTO,THINLAYER,RESCR: HCPCS

## 2018-11-29 PROCEDURE — G8484 FLU IMMUNIZE NO ADMIN: HCPCS | Performed by: INTERNAL MEDICINE

## 2018-11-29 RX ORDER — ESTRADIOL 1 MG/1
1 TABLET ORAL DAILY
Qty: 90 TABLET | Refills: 5 | Status: SHIPPED | OUTPATIENT
Start: 2018-11-29 | End: 2019-03-05 | Stop reason: ALTCHOICE

## 2018-11-29 RX ORDER — MEDROXYPROGESTERONE ACETATE 2.5 MG/1
2.5 TABLET ORAL DAILY
Qty: 90 TABLET | Refills: 5 | Status: SHIPPED | OUTPATIENT
Start: 2018-11-29 | End: 2019-03-05 | Stop reason: ALTCHOICE

## 2018-11-29 ASSESSMENT — PATIENT HEALTH QUESTIONNAIRE - PHQ9
SUM OF ALL RESPONSES TO PHQ QUESTIONS 1-9: 0
SUM OF ALL RESPONSES TO PHQ9 QUESTIONS 1 & 2: 0
SUM OF ALL RESPONSES TO PHQ QUESTIONS 1-9: 0
1. LITTLE INTEREST OR PLEASURE IN DOING THINGS: 0
2. FEELING DOWN, DEPRESSED OR HOPELESS: 0

## 2018-11-29 NOTE — PROGRESS NOTES
(gastroesophageal reflux disease)    Fatigue    Allergic rhinitis    Low back pain    Other disorder of menstruation and other abnormal bleeding from female genital tract    Depression    YOJANA on CPAP    Thyroid nodule    Animal bite of finger          Hereditary Breast, Ovarian, Colon and Uterine Cancer screening Done. Tobacco & Secondary smoke risks reviewed; instructed on cessation and avoidance    PLAN:  Return in about 1 year (around 11/29/2019) for Annual Exam.  Repeat pap per ASCCP 2013 guidelines  Return for annual exams  Mammograms every 1 year. If 33 yo and last mammogram was negative. Routine health maintenance per patients PCP. Orders Placed This Encounter   Procedures    KARINA DIGITAL SCREEN W CAD BILATERAL     Standing Status:   Future     Standing Expiration Date:   11/29/2019     Scheduling Instructions: On the day of the exam, the patient should not wear deodorant, lotion, or powder on the breast or underarm area. Wear a two piece outfit and be prepared to give information about prior breast procedures including mammograms, biopsies, or surgeries. If you've had mammograms done elsewhere, please request any previous mammogram films from those organizations prior to your appointment. Order Specific Question:   Reason for exam:     Answer:   SCREENING MAMMOGRAM    PAP SMEAR     Standing Status:   Future     Number of Occurrences:   1     Standing Expiration Date:   1/29/2019     Order Specific Question:   Collection Type     Answer: Thin Prep     Order Specific Question:   Prior Abnormal Pap Test     Answer:   No     Order Specific Question:   Screening or Diagnostic     Answer:   Screening     Order Specific Question:   HPV Requested?      Answer:   Yes - If Abnormal Reflex HPV     Order Specific Question:   High Risk Patient     Answer:   Tra Boswell, [place], [place]     Referral Priority:   Routine     Referral Type:   Eval and Treat Referral Reason:   Specialty Services Required     Referred to Provider:   Iva Dubois Specialty:   Genetic Counselor     Number of Visits Requested:   Pratik Pelletier  11/29/2018      (Please note that portions of this note were completed with a voice-recognition program. Efforts were made to edit the dictations but occasionally words are mis-transcribed.)

## 2018-12-02 PROBLEM — M67.40 GANGLION CYST: Status: ACTIVE | Noted: 2018-12-02

## 2018-12-10 ENCOUNTER — INITIAL CONSULT (OUTPATIENT)
Dept: ONCOLOGY | Age: 53
End: 2018-12-10
Payer: COMMERCIAL

## 2018-12-10 DIAGNOSIS — Z80.3 FAMILY HISTORY OF BREAST CANCER: Primary | ICD-10-CM

## 2018-12-10 PROCEDURE — 96040 PR GENETIC COUNSELING, EACH 30 MIN: CPT | Performed by: GENETIC COUNSELOR, MS

## 2018-12-12 LAB — CYTOLOGY REPORT: NORMAL

## 2018-12-19 DIAGNOSIS — K21.9 GASTROESOPHAGEAL REFLUX DISEASE WITHOUT ESOPHAGITIS: Primary | ICD-10-CM

## 2018-12-19 RX ORDER — OMEPRAZOLE 20 MG/1
CAPSULE, DELAYED RELEASE ORAL
Qty: 30 CAPSULE | Refills: 2 | Status: SHIPPED | OUTPATIENT
Start: 2018-12-19 | End: 2019-04-22 | Stop reason: SDUPTHER

## 2018-12-27 ENCOUNTER — HOSPITAL ENCOUNTER (OUTPATIENT)
Dept: MRI IMAGING | Age: 53
Discharge: HOME | End: 2018-12-29
Payer: COMMERCIAL

## 2018-12-27 DIAGNOSIS — Z80.3 FAMILY HISTORY OF BREAST CANCER: ICD-10-CM

## 2018-12-27 PROCEDURE — A9579 GAD-BASE MR CONTRAST NOS,1ML: HCPCS | Performed by: NURSE PRACTITIONER

## 2018-12-27 PROCEDURE — 6360000004 HC RX CONTRAST MEDICATION: Performed by: NURSE PRACTITIONER

## 2018-12-27 PROCEDURE — 2709999900 MRI BREAST BILATERAL W WO CONTRAST

## 2018-12-27 RX ADMIN — GADOTERIDOL 20 ML: 279.3 INJECTION, SOLUTION INTRAVENOUS at 14:22

## 2019-01-09 ENCOUNTER — HOSPITAL ENCOUNTER (OUTPATIENT)
Dept: ULTRASOUND IMAGING | Age: 54
Discharge: HOME | End: 2019-01-11
Payer: COMMERCIAL

## 2019-01-09 ENCOUNTER — TELEPHONE (OUTPATIENT)
Dept: MAMMOGRAPHY | Age: 54
End: 2019-01-09

## 2019-01-09 DIAGNOSIS — Z80.3 FAMILY HISTORY OF BREAST CANCER: ICD-10-CM

## 2019-01-09 DIAGNOSIS — R92.8 ABNORMAL MRI, BREAST: ICD-10-CM

## 2019-01-09 DIAGNOSIS — R92.8 FOLLOW-UP EXAMINATION OF ABNORMAL MAMMOGRAM: Primary | ICD-10-CM

## 2019-01-09 PROCEDURE — 76642 ULTRASOUND BREAST LIMITED: CPT

## 2019-01-14 ENCOUNTER — OFFICE VISIT (OUTPATIENT)
Dept: SURGERY | Age: 54
End: 2019-01-14
Payer: COMMERCIAL

## 2019-01-14 VITALS
SYSTOLIC BLOOD PRESSURE: 122 MMHG | WEIGHT: 177 LBS | DIASTOLIC BLOOD PRESSURE: 80 MMHG | RESPIRATION RATE: 16 BRPM | BODY MASS INDEX: 30.22 KG/M2 | HEIGHT: 64 IN | TEMPERATURE: 98.7 F | HEART RATE: 68 BPM

## 2019-01-14 DIAGNOSIS — R92.8 ABNORMAL ULTRASOUND OF BREAST: ICD-10-CM

## 2019-01-14 DIAGNOSIS — N63.0 BREAST MASS: Primary | ICD-10-CM

## 2019-01-14 PROCEDURE — 1036F TOBACCO NON-USER: CPT | Performed by: SURGERY

## 2019-01-14 PROCEDURE — 99202 OFFICE O/P NEW SF 15 MIN: CPT | Performed by: SURGERY

## 2019-01-14 PROCEDURE — G8417 CALC BMI ABV UP PARAM F/U: HCPCS | Performed by: SURGERY

## 2019-01-14 PROCEDURE — G8427 DOCREV CUR MEDS BY ELIG CLIN: HCPCS | Performed by: SURGERY

## 2019-01-14 PROCEDURE — 3017F COLORECTAL CA SCREEN DOC REV: CPT | Performed by: SURGERY

## 2019-01-14 PROCEDURE — G8484 FLU IMMUNIZE NO ADMIN: HCPCS | Performed by: SURGERY

## 2019-01-16 ENCOUNTER — HOSPITAL ENCOUNTER (OUTPATIENT)
Age: 54
Setting detail: SPECIMEN
Discharge: HOME | End: 2019-01-16
Payer: COMMERCIAL

## 2019-01-16 ENCOUNTER — HOSPITAL ENCOUNTER (OUTPATIENT)
Dept: MAMMOGRAPHY | Age: 54
Discharge: HOME | End: 2019-01-18
Payer: COMMERCIAL

## 2019-01-16 ENCOUNTER — HOSPITAL ENCOUNTER (OUTPATIENT)
Dept: ULTRASOUND IMAGING | Age: 54
Discharge: HOME | End: 2019-01-18
Payer: COMMERCIAL

## 2019-01-16 DIAGNOSIS — N63.0 BREAST MASS: ICD-10-CM

## 2019-01-16 DIAGNOSIS — R92.8 ABNORMAL ULTRASOUND OF BREAST: ICD-10-CM

## 2019-01-16 PROCEDURE — 2500000003 HC RX 250 WO HCPCS

## 2019-01-16 PROCEDURE — 19083 BX BREAST 1ST LESION US IMAG: CPT

## 2019-01-16 PROCEDURE — 88305 TISSUE EXAM BY PATHOLOGIST: CPT

## 2019-01-16 PROCEDURE — 77065 DX MAMMO INCL CAD UNI: CPT

## 2019-01-18 LAB — SURGICAL PATHOLOGY REPORT: NORMAL

## 2019-01-25 ENCOUNTER — OFFICE VISIT (OUTPATIENT)
Dept: SURGERY | Age: 54
End: 2019-01-25
Payer: COMMERCIAL

## 2019-01-25 VITALS
TEMPERATURE: 98.2 F | RESPIRATION RATE: 18 BRPM | HEIGHT: 64 IN | SYSTOLIC BLOOD PRESSURE: 136 MMHG | WEIGHT: 177.03 LBS | DIASTOLIC BLOOD PRESSURE: 84 MMHG | HEART RATE: 80 BPM | BODY MASS INDEX: 30.22 KG/M2

## 2019-01-25 DIAGNOSIS — D24.2 FIBROADENOMA OF LEFT BREAST: Primary | ICD-10-CM

## 2019-01-25 PROCEDURE — 3017F COLORECTAL CA SCREEN DOC REV: CPT | Performed by: SURGERY

## 2019-01-25 PROCEDURE — G8417 CALC BMI ABV UP PARAM F/U: HCPCS | Performed by: SURGERY

## 2019-01-25 PROCEDURE — 99212 OFFICE O/P EST SF 10 MIN: CPT | Performed by: SURGERY

## 2019-01-25 PROCEDURE — G8484 FLU IMMUNIZE NO ADMIN: HCPCS | Performed by: SURGERY

## 2019-01-25 PROCEDURE — G8427 DOCREV CUR MEDS BY ELIG CLIN: HCPCS | Performed by: SURGERY

## 2019-01-25 PROCEDURE — 1036F TOBACCO NON-USER: CPT | Performed by: SURGERY

## 2019-02-26 DIAGNOSIS — M67.40 GANGLION CYST: Primary | ICD-10-CM

## 2019-02-28 DIAGNOSIS — M67.40 GANGLION CYST: Primary | ICD-10-CM

## 2019-03-05 ENCOUNTER — OFFICE VISIT (OUTPATIENT)
Dept: ORTHOPEDIC SURGERY | Age: 54
End: 2019-03-05
Payer: COMMERCIAL

## 2019-03-05 ENCOUNTER — HOSPITAL ENCOUNTER (OUTPATIENT)
Dept: GENERAL RADIOLOGY | Age: 54
Discharge: HOME | End: 2019-03-07
Payer: COMMERCIAL

## 2019-03-05 VITALS
DIASTOLIC BLOOD PRESSURE: 64 MMHG | HEART RATE: 95 BPM | SYSTOLIC BLOOD PRESSURE: 118 MMHG | WEIGHT: 177 LBS | BODY MASS INDEX: 30.22 KG/M2 | HEIGHT: 64 IN

## 2019-03-05 DIAGNOSIS — M67.431 GANGLION CYST OF WRIST, RIGHT: Primary | ICD-10-CM

## 2019-03-05 DIAGNOSIS — M67.40 GANGLION CYST: ICD-10-CM

## 2019-03-05 PROCEDURE — G8417 CALC BMI ABV UP PARAM F/U: HCPCS | Performed by: NURSE PRACTITIONER

## 2019-03-05 PROCEDURE — 73110 X-RAY EXAM OF WRIST: CPT

## 2019-03-05 PROCEDURE — G8427 DOCREV CUR MEDS BY ELIG CLIN: HCPCS | Performed by: NURSE PRACTITIONER

## 2019-03-05 PROCEDURE — G8484 FLU IMMUNIZE NO ADMIN: HCPCS | Performed by: NURSE PRACTITIONER

## 2019-03-05 PROCEDURE — 99203 OFFICE O/P NEW LOW 30 MIN: CPT | Performed by: NURSE PRACTITIONER

## 2019-03-05 PROCEDURE — 3017F COLORECTAL CA SCREEN DOC REV: CPT | Performed by: NURSE PRACTITIONER

## 2019-03-05 PROCEDURE — 1036F TOBACCO NON-USER: CPT | Performed by: NURSE PRACTITIONER

## 2019-04-20 DIAGNOSIS — F33.1 MODERATE EPISODE OF RECURRENT MAJOR DEPRESSIVE DISORDER (HCC): ICD-10-CM

## 2019-04-22 DIAGNOSIS — K21.9 GASTROESOPHAGEAL REFLUX DISEASE WITHOUT ESOPHAGITIS: ICD-10-CM

## 2019-04-22 DIAGNOSIS — F33.1 MODERATE EPISODE OF RECURRENT MAJOR DEPRESSIVE DISORDER (HCC): ICD-10-CM

## 2019-04-22 RX ORDER — SERTRALINE HYDROCHLORIDE 100 MG/1
TABLET, FILM COATED ORAL
Qty: 90 TABLET | Refills: 3 | Status: SHIPPED | OUTPATIENT
Start: 2019-04-22 | End: 2019-04-22

## 2019-04-22 RX ORDER — SERTRALINE HYDROCHLORIDE 100 MG/1
100 TABLET, FILM COATED ORAL DAILY
Qty: 90 TABLET | Refills: 3 | Status: SHIPPED | OUTPATIENT
Start: 2019-04-22 | End: 2020-04-21 | Stop reason: SDUPTHER

## 2019-04-22 RX ORDER — OMEPRAZOLE 20 MG/1
20 CAPSULE, DELAYED RELEASE ORAL DAILY
Qty: 90 CAPSULE | Refills: 3 | Status: SHIPPED | OUTPATIENT
Start: 2019-04-22 | End: 2020-06-17 | Stop reason: ALTCHOICE

## 2019-05-20 ENCOUNTER — ANESTHESIA EVENT (OUTPATIENT)
Dept: OPERATING ROOM | Age: 54
End: 2019-05-20
Payer: COMMERCIAL

## 2019-05-21 ENCOUNTER — ANESTHESIA (OUTPATIENT)
Dept: OPERATING ROOM | Age: 54
End: 2019-05-21
Payer: COMMERCIAL

## 2019-05-21 ENCOUNTER — HOSPITAL ENCOUNTER (OUTPATIENT)
Age: 54
Setting detail: OUTPATIENT SURGERY
Discharge: HOME | End: 2019-05-21
Attending: ORTHOPAEDIC SURGERY | Admitting: ORTHOPAEDIC SURGERY
Payer: COMMERCIAL

## 2019-05-21 VITALS
HEART RATE: 88 BPM | BODY MASS INDEX: 29.71 KG/M2 | WEIGHT: 174 LBS | HEIGHT: 64 IN | OXYGEN SATURATION: 95 % | DIASTOLIC BLOOD PRESSURE: 74 MMHG | TEMPERATURE: 96.9 F | RESPIRATION RATE: 16 BRPM | SYSTOLIC BLOOD PRESSURE: 119 MMHG

## 2019-05-21 VITALS
OXYGEN SATURATION: 100 % | TEMPERATURE: 95.9 F | DIASTOLIC BLOOD PRESSURE: 72 MMHG | SYSTOLIC BLOOD PRESSURE: 128 MMHG | RESPIRATION RATE: 11 BRPM

## 2019-05-21 DIAGNOSIS — Z98.890 S/P EXCISION OF GANGLION CYST: Primary | ICD-10-CM

## 2019-05-21 PROCEDURE — 6360000002 HC RX W HCPCS: Performed by: NURSE ANESTHETIST, CERTIFIED REGISTERED

## 2019-05-21 PROCEDURE — 3700000000 HC ANESTHESIA ATTENDED CARE: Performed by: ORTHOPAEDIC SURGERY

## 2019-05-21 PROCEDURE — 7100000010 HC PHASE II RECOVERY - FIRST 15 MIN: Performed by: ORTHOPAEDIC SURGERY

## 2019-05-21 PROCEDURE — 3600000012 HC SURGERY LEVEL 2 ADDTL 15MIN: Performed by: ORTHOPAEDIC SURGERY

## 2019-05-21 PROCEDURE — 25111 REMOVE WRIST TENDON LESION: CPT | Performed by: ORTHOPAEDIC SURGERY

## 2019-05-21 PROCEDURE — 3700000001 HC ADD 15 MINUTES (ANESTHESIA): Performed by: ORTHOPAEDIC SURGERY

## 2019-05-21 PROCEDURE — 3600000002 HC SURGERY LEVEL 2 BASE: Performed by: ORTHOPAEDIC SURGERY

## 2019-05-21 PROCEDURE — 2580000003 HC RX 258: Performed by: ORTHOPAEDIC SURGERY

## 2019-05-21 PROCEDURE — 7100000000 HC PACU RECOVERY - FIRST 15 MIN: Performed by: ORTHOPAEDIC SURGERY

## 2019-05-21 PROCEDURE — 6360000002 HC RX W HCPCS: Performed by: NURSE PRACTITIONER

## 2019-05-21 PROCEDURE — 2500000003 HC RX 250 WO HCPCS: Performed by: NURSE ANESTHETIST, CERTIFIED REGISTERED

## 2019-05-21 PROCEDURE — 2709999900 HC NON-CHARGEABLE SUPPLY: Performed by: ORTHOPAEDIC SURGERY

## 2019-05-21 PROCEDURE — 7100000011 HC PHASE II RECOVERY - ADDTL 15 MIN: Performed by: ORTHOPAEDIC SURGERY

## 2019-05-21 PROCEDURE — 7100000001 HC PACU RECOVERY - ADDTL 15 MIN: Performed by: ORTHOPAEDIC SURGERY

## 2019-05-21 PROCEDURE — 2500000003 HC RX 250 WO HCPCS: Performed by: ORTHOPAEDIC SURGERY

## 2019-05-21 RX ORDER — MORPHINE SULFATE 2 MG/ML
2 INJECTION, SOLUTION INTRAMUSCULAR; INTRAVENOUS EVERY 5 MIN PRN
Status: DISCONTINUED | OUTPATIENT
Start: 2019-05-21 | End: 2019-05-21 | Stop reason: HOSPADM

## 2019-05-21 RX ORDER — MIDAZOLAM HYDROCHLORIDE 1 MG/ML
INJECTION INTRAMUSCULAR; INTRAVENOUS PRN
Status: DISCONTINUED | OUTPATIENT
Start: 2019-05-21 | End: 2019-05-21 | Stop reason: SDUPTHER

## 2019-05-21 RX ORDER — FENTANYL CITRATE 50 UG/ML
INJECTION, SOLUTION INTRAMUSCULAR; INTRAVENOUS PRN
Status: DISCONTINUED | OUTPATIENT
Start: 2019-05-21 | End: 2019-05-21 | Stop reason: SDUPTHER

## 2019-05-21 RX ORDER — ONDANSETRON 2 MG/ML
4 INJECTION INTRAMUSCULAR; INTRAVENOUS PRN
Status: DISCONTINUED | OUTPATIENT
Start: 2019-05-21 | End: 2019-05-21 | Stop reason: HOSPADM

## 2019-05-21 RX ORDER — LIDOCAINE HYDROCHLORIDE 20 MG/ML
INJECTION, SOLUTION EPIDURAL; INFILTRATION; INTRACAUDAL; PERINEURAL PRN
Status: DISCONTINUED | OUTPATIENT
Start: 2019-05-21 | End: 2019-05-21 | Stop reason: SDUPTHER

## 2019-05-21 RX ORDER — EPHEDRINE SULFATE/0.9% NACL/PF 50 MG/5 ML
SYRINGE (ML) INTRAVENOUS PRN
Status: DISCONTINUED | OUTPATIENT
Start: 2019-05-21 | End: 2019-05-21 | Stop reason: SDUPTHER

## 2019-05-21 RX ORDER — SODIUM CHLORIDE 0.9 % (FLUSH) 0.9 %
10 SYRINGE (ML) INJECTION PRN
Status: DISCONTINUED | OUTPATIENT
Start: 2019-05-21 | End: 2019-05-21 | Stop reason: HOSPADM

## 2019-05-21 RX ORDER — DIPHENHYDRAMINE HYDROCHLORIDE 50 MG/ML
12.5 INJECTION INTRAMUSCULAR; INTRAVENOUS
Status: DISCONTINUED | OUTPATIENT
Start: 2019-05-21 | End: 2019-05-21 | Stop reason: HOSPADM

## 2019-05-21 RX ORDER — ONDANSETRON 2 MG/ML
INJECTION INTRAMUSCULAR; INTRAVENOUS PRN
Status: DISCONTINUED | OUTPATIENT
Start: 2019-05-21 | End: 2019-05-21 | Stop reason: SDUPTHER

## 2019-05-21 RX ORDER — OXYCODONE HYDROCHLORIDE 5 MG/1
5 TABLET ORAL PRN
Status: DISCONTINUED | OUTPATIENT
Start: 2019-05-21 | End: 2019-05-21 | Stop reason: HOSPADM

## 2019-05-21 RX ORDER — FENTANYL CITRATE 50 UG/ML
25 INJECTION, SOLUTION INTRAMUSCULAR; INTRAVENOUS EVERY 5 MIN PRN
Status: DISCONTINUED | OUTPATIENT
Start: 2019-05-21 | End: 2019-05-21 | Stop reason: HOSPADM

## 2019-05-21 RX ORDER — FENTANYL CITRATE 50 UG/ML
50 INJECTION, SOLUTION INTRAMUSCULAR; INTRAVENOUS EVERY 5 MIN PRN
Status: DISCONTINUED | OUTPATIENT
Start: 2019-05-21 | End: 2019-05-21 | Stop reason: HOSPADM

## 2019-05-21 RX ORDER — PROPOFOL 10 MG/ML
INJECTION, EMULSION INTRAVENOUS PRN
Status: DISCONTINUED | OUTPATIENT
Start: 2019-05-21 | End: 2019-05-21 | Stop reason: SDUPTHER

## 2019-05-21 RX ORDER — KETOROLAC TROMETHAMINE 30 MG/ML
INJECTION, SOLUTION INTRAMUSCULAR; INTRAVENOUS PRN
Status: DISCONTINUED | OUTPATIENT
Start: 2019-05-21 | End: 2019-05-21 | Stop reason: SDUPTHER

## 2019-05-21 RX ORDER — SODIUM CHLORIDE 0.9 % (FLUSH) 0.9 %
10 SYRINGE (ML) INJECTION EVERY 12 HOURS SCHEDULED
Status: DISCONTINUED | OUTPATIENT
Start: 2019-05-21 | End: 2019-05-21 | Stop reason: HOSPADM

## 2019-05-21 RX ORDER — DEXAMETHASONE SODIUM PHOSPHATE 10 MG/ML
INJECTION INTRAMUSCULAR; INTRAVENOUS PRN
Status: DISCONTINUED | OUTPATIENT
Start: 2019-05-21 | End: 2019-05-21 | Stop reason: SDUPTHER

## 2019-05-21 RX ORDER — HYDROCODONE BITARTRATE AND ACETAMINOPHEN 5; 325 MG/1; MG/1
1-2 TABLET ORAL
Qty: 30 TABLET | Refills: 0 | Status: SHIPPED | OUTPATIENT
Start: 2019-05-21 | End: 2019-05-28

## 2019-05-21 RX ORDER — SODIUM CHLORIDE, SODIUM LACTATE, POTASSIUM CHLORIDE, CALCIUM CHLORIDE 600; 310; 30; 20 MG/100ML; MG/100ML; MG/100ML; MG/100ML
INJECTION, SOLUTION INTRAVENOUS CONTINUOUS
Status: DISCONTINUED | OUTPATIENT
Start: 2019-05-21 | End: 2019-05-21 | Stop reason: HOSPADM

## 2019-05-21 RX ORDER — OXYCODONE HYDROCHLORIDE 5 MG/1
10 TABLET ORAL PRN
Status: DISCONTINUED | OUTPATIENT
Start: 2019-05-21 | End: 2019-05-21 | Stop reason: HOSPADM

## 2019-05-21 RX ORDER — BUPIVACAINE HYDROCHLORIDE 5 MG/ML
INJECTION, SOLUTION EPIDURAL; INTRACAUDAL PRN
Status: DISCONTINUED | OUTPATIENT
Start: 2019-05-21 | End: 2019-05-21 | Stop reason: ALTCHOICE

## 2019-05-21 RX ADMIN — SODIUM CHLORIDE, POTASSIUM CHLORIDE, SODIUM LACTATE AND CALCIUM CHLORIDE: 600; 310; 30; 20 INJECTION, SOLUTION INTRAVENOUS at 08:48

## 2019-05-21 RX ADMIN — PROPOFOL 75 MG: 10 INJECTION, EMULSION INTRAVENOUS at 09:41

## 2019-05-21 RX ADMIN — Medication 10 MG: at 09:53

## 2019-05-21 RX ADMIN — Medication 10 MG: at 09:45

## 2019-05-21 RX ADMIN — MIDAZOLAM HYDROCHLORIDE 2 MG: 1 INJECTION, SOLUTION INTRAMUSCULAR; INTRAVENOUS at 09:32

## 2019-05-21 RX ADMIN — Medication 10 MG: at 09:55

## 2019-05-21 RX ADMIN — FENTANYL CITRATE 50 MCG: 50 INJECTION, SOLUTION INTRAMUSCULAR; INTRAVENOUS at 10:18

## 2019-05-21 RX ADMIN — ONDANSETRON 4 MG: 2 INJECTION INTRAMUSCULAR; INTRAVENOUS at 09:45

## 2019-05-21 RX ADMIN — Medication 10 MG: at 09:50

## 2019-05-21 RX ADMIN — KETOROLAC TROMETHAMINE 30 MG: 30 INJECTION, SOLUTION INTRAMUSCULAR; INTRAVENOUS at 09:58

## 2019-05-21 RX ADMIN — DEXAMETHASONE SODIUM PHOSPHATE 10 MG: 10 INJECTION INTRAMUSCULAR; INTRAVENOUS at 09:45

## 2019-05-21 RX ADMIN — FENTANYL CITRATE 100 MCG: 50 INJECTION, SOLUTION INTRAMUSCULAR; INTRAVENOUS at 09:32

## 2019-05-21 RX ADMIN — LIDOCAINE HYDROCHLORIDE 60 MG: 20 INJECTION, SOLUTION EPIDURAL; INFILTRATION; INTRACAUDAL; PERINEURAL at 09:41

## 2019-05-21 RX ADMIN — Medication 10 MG: at 09:46

## 2019-05-21 RX ADMIN — SODIUM CHLORIDE, POTASSIUM CHLORIDE, SODIUM LACTATE AND CALCIUM CHLORIDE: 600; 310; 30; 20 INJECTION, SOLUTION INTRAVENOUS at 09:32

## 2019-05-21 ASSESSMENT — PAIN SCALES - GENERAL
PAINLEVEL_OUTOF10: 8
PAINLEVEL_OUTOF10: 4
PAINLEVEL_OUTOF10: 0
PAINLEVEL_OUTOF10: 8
PAINLEVEL_OUTOF10: 0

## 2019-05-21 ASSESSMENT — PULMONARY FUNCTION TESTS
PIF_VALUE: 9
PIF_VALUE: 15
PIF_VALUE: 15
PIF_VALUE: 14
PIF_VALUE: 14
PIF_VALUE: 15
PIF_VALUE: 10
PIF_VALUE: 12
PIF_VALUE: 14
PIF_VALUE: 8
PIF_VALUE: 9
PIF_VALUE: 14
PIF_VALUE: 10
PIF_VALUE: 10
PIF_VALUE: 9
PIF_VALUE: 10
PIF_VALUE: 15
PIF_VALUE: 15
PIF_VALUE: 14
PIF_VALUE: 11
PIF_VALUE: 9
PIF_VALUE: 2
PIF_VALUE: 15
PIF_VALUE: 11
PIF_VALUE: 15
PIF_VALUE: 15
PIF_VALUE: 9
PIF_VALUE: 14

## 2019-05-21 ASSESSMENT — PAIN DESCRIPTION - DESCRIPTORS
DESCRIPTORS: ACHING
DESCRIPTORS: NUMBNESS

## 2019-05-21 ASSESSMENT — PAIN - FUNCTIONAL ASSESSMENT: PAIN_FUNCTIONAL_ASSESSMENT: 0-10

## 2019-05-21 NOTE — ANESTHESIA PRE PROCEDURE
Department of Anesthesiology  Preprocedure Note       Name:  Corrinne Corral   Age:  62 y.o.  :  1965                                          MRN:  [id number]         Date:  2019      Surgeon: Alberto Valentin):  Philemon Callas, MD    Procedure: Excision Right Wrist ganglion cyst (Right )    Medications prior to admission:   Prior to Admission medications    Medication Sig Start Date End Date Taking? Authorizing Provider   HYDROcodone-acetaminophen (NORCO) 5-325 MG per tablet Take 1-2 tablets by mouth every 4-6 hours as needed for Pain for up to 7 days. 19 Yes JENY Dave - CNP   sertraline (ZOLOFT) 100 MG tablet Take 1 tablet by mouth daily 19  Yes Maria Eugenia Kim MD   omeprazole (PRILOSEC) 20 MG delayed release capsule Take 1 capsule by mouth Daily 19  Yes Maria Eugenia Kim MD   Multiple Vitamins-Minerals (MULTIVITAL PO) Take 1 tablet by mouth daily as needed    Yes Historical Provider, MD   Cholecalciferol (VITAMIN D) 2000 UNITS CAPS capsule Take 2,000 Units by mouth daily    Yes Historical Provider, MD       Current medications:    Current Facility-Administered Medications   Medication Dose Route Frequency Provider Last Rate Last Dose    sodium chloride flush 0.9 % injection 10 mL  10 mL Intravenous 2 times per day Philemon Callas, MD        sodium chloride flush 0.9 % injection 10 mL  10 mL Intravenous PRN Philemon Callas, MD        lactated ringers infusion   Intravenous Continuous Philemon Callas, MD           Allergies:     Allergies   Allergen Reactions    Doxycycline      Urticaria      Aspirin Rash    Penicillins Swelling and Rash       Problem List:    Patient Active Problem List   Diagnosis Code    GERD (gastroesophageal reflux disease) K21.9    Fatigue R53.83    Allergic rhinitis J30.9    Low back pain M54.5    Other disorder of menstruation and other abnormal bleeding from female genital tract N94.9    Depression F32.9    YOJANA on CPAP G47.33, Z99.89  Animal bite of finger S61.259A    Ganglion cyst M67.40       Past Medical History:        Diagnosis Date    Allergic rhinitis     Anxiety     Originally , with panic    Chronic tension headaches     occasional    Depression     Zoloft started     DUB (dysfunctional uterine bleeding)     ablation    Fatigue     GERD (gastroesophageal reflux disease)     No prior scope as of PE 16- mild sx over yrs managed without meds    Hepatitis A 1969    age 8   Keiry Sara Internal hemorrhoids     on scope     Lateral epicondylitis 3/11/2014    resolved    Low back pain     YOJANA on CPAP     sleep study  CPAP 12    Thyroid nodule     u/s  stable sub cm     Uterine fibroid        Past Surgical History:        Procedure Laterality Date     SECTION      COLONOSCOPY  2014    Zoë Leahy    DILATION AND CURETTAGE  13 and 2008    Dr eMdina Real UTERUS  13    ENDOMETRIAL ABLATION  13    HYSTEROSCOPY  13    Dr Sonya Hogue    HYSTEROSCOPY  13    WISDOM TOOTH EXTRACTION         Social History:    Social History     Tobacco Use    Smoking status: Never Smoker    Smokeless tobacco: Never Used   Substance Use Topics    Alcohol use:  Yes     Alcohol/week: 0.0 oz     Comment: socially                                Counseling given: Not Answered      Vital Signs (Current):   Vitals:    19 0838   BP: 124/77   Pulse: 71   Resp: 16   Temp: 36.1 °C (96.9 °F)   TempSrc: Oral   SpO2: 96%   Weight: 174 lb (78.9 kg)   Height: 5' 4\" (1.626 m)                                              BP Readings from Last 3 Encounters:   19 124/77   19 118/64   19 136/84       NPO Status: Time of last liquid consumption:                         Time of last solid consumption:                         Date of last liquid consumption: 19                        Date of last solid food consumption: 19    BMI: Wt Readings from Last 3 Encounters:   05/21/19 174 lb (78.9 kg)   03/05/19 177 lb (80.3 kg)   01/25/19 177 lb 0.5 oz (80.3 kg)     Body mass index is 29.87 kg/m². CBC:   Lab Results   Component Value Date    WBC 4.9 04/08/2017    RBC 4.51 04/08/2017    HGB 12.9 04/08/2017    HCT 39.4 04/08/2017    MCV 87.4 04/08/2017    RDW 12.8 04/08/2017     04/08/2017       CMP:   Lab Results   Component Value Date     11/24/2018    K 3.8 11/24/2018     11/24/2018    CO2 27 11/24/2018    BUN 14 11/24/2018    CREATININE 0.71 11/24/2018    GFRAA >60 11/24/2018    LABGLOM >60 11/24/2018    GLUCOSE 88 11/24/2018    PROT 7.4 10/17/2015    CALCIUM 9.2 11/24/2018    BILITOT 0.31 10/17/2015    ALKPHOS 75 10/17/2015    AST 15 10/17/2015    ALT 14 10/17/2015       POC Tests: No results for input(s): POCGLU, POCNA, POCK, POCCL, POCBUN, POCHEMO, POCHCT in the last 72 hours. Coags: No results found for: PROTIME, INR, APTT    HCG (If Applicable):   Lab Results   Component Value Date    PREGTESTUR NEGATIVE 04/25/2014        ABGs: No results found for: PHART, PO2ART, FLD4WFT, VVV1BQG, BEART, W4IKSZDV     Type & Screen (If Applicable):  No results found for: LABABO, [address]    Anesthesia Evaluation  Patient summary reviewed and Nursing notes reviewed no history of anesthetic complications:   Airway: Mallampati: III  TM distance: >3 FB   Neck ROM: full  Mouth opening: > = 3 FB Dental: normal exam         Pulmonary: breath sounds clear to auscultation  (+) sleep apnea: on CPAP,                             Cardiovascular:  Exercise tolerance: good (>4 METS),           Rhythm: regular  Rate: normal           Beta Blocker:  Not on Beta Blocker         Neuro/Psych:   (+) headaches:, depression/anxiety             GI/Hepatic/Renal:   (+) GERD: well controlled, hepatitis: A, liver disease:,           Endo/Other: Negative Endo/Other ROS                    Abdominal:           Vascular: negative vascular ROS. Anesthesia Plan      general     ASA 2       Induction: intravenous. MIPS: Prophylactic antiemetics administered. Anesthetic plan and risks discussed with patient.       Plan discussed with surgical team.                  JENY Linares - CRNA   5/21/2019

## 2019-05-21 NOTE — H&P
History and Physical        CHIEF COMPLAINT:  The patient is here for right wrist ganglion cyst.      HISTORY OF PRESENT ILLNESS:      The patient is a 62 y.o. female  who presents with  a notable lump to the right wrist.  She states she has noted this since December. It has increasingly gotten bigger, possibly doubled in size. She states that it does bother her as she does wear jewelry; she is a right-handed individual.  She states that at times when she is writing or using a computer she will have notable pain to the right wrist.       Past Medical History:    Past Medical History:   Diagnosis Date    Allergic rhinitis     Anxiety     Originally , with panic    Chronic tension headaches     occasional    Depression     Zoloft started     DUB (dysfunctional uterine bleeding)     ablation    Fatigue     GERD (gastroesophageal reflux disease)     No prior scope as of PE 16- mild sx over yrs managed without meds    Hepatitis A 1969    age 8   Erle Gary Internal hemorrhoids     on scope     Lateral epicondylitis 3/11/2014    resolved    Low back pain     YOJANA on CPAP     sleep study  CPAP 12    Thyroid nodule     u/s  stable sub cm     Uterine fibroid        Past Surgical History:    Past Surgical History:   Procedure Laterality Date     SECTION      COLONOSCOPY  2014    Zoë Leahy    DILATION AND CURETTAGE  13 and 2008    Dr Efrain Sainz UTERUS  13    ENDOMETRIAL ABLATION  13    HYSTEROSCOPY  13    Dr Javed Degroot [facility]    HYSTEROSCOPY  13    WISDOM TOOTH EXTRACTION         Medications Prior to Admission:   Prior to Admission medications    Medication Sig Start Date End Date Taking?  Authorizing Provider   sertraline (ZOLOFT) 100 MG tablet Take 1 tablet by mouth daily 19   Sal Valles MD   omeprazole (PRILOSEC) 20 MG delayed release capsule Take 1 capsule by mouth Daily 19   Sal Valles MD Multiple Vitamins-Minerals (MULTIVITAL PO) Take 1 tablet by mouth daily as needed     Historical Provider, MD   Cholecalciferol (VITAMIN D) 2000 UNITS CAPS capsule Take 2,000 Units by mouth daily     Historical Provider, MD    Scheduled Meds:  Continuous Infusions:  PRN Meds:. Allergies:  Doxycycline; Aspirin; and Penicillins    Social History:   Social History     Socioeconomic History    Marital status:      Spouse name: Not on file    Number of children: 3    Years of education: Not on file    Highest education level: Not on file   Occupational History    Occupation: admin     Employer: [employer]   Social Needs    Financial resource strain: Not on file    Food insecurity:     Worry: Not on file     Inability: Not on file    Transportation needs:     Medical: Not on file     Non-medical: Not on file   Tobacco Use    Smoking status: Never Smoker    Smokeless tobacco: Never Used   Substance and Sexual Activity    Alcohol use: Yes     Alcohol/week: 0.0 oz     Comment: socially    Drug use: No    Sexual activity: Yes     Partners: Male     Comment: Works at [employer].     Lifestyle    Physical activity:     Days per week: Not on file     Minutes per session: Not on file    Stress: Not on file   Relationships    Social connections:     Talks on phone: Not on file     Gets together: Not on file     Attends Sabianism service: Not on file     Active member of club or organization: Not on file     Attends meetings of clubs or organizations: Not on file     Relationship status: Not on file    Intimate partner violence:     Fear of current or ex partner: Not on file     Emotionally abused: Not on file     Physically abused: Not on file     Forced sexual activity: Not on file   Other Topics Concern    Not on file   Social History Narrative    Not on file     Social History     Tobacco Use   Smoking Status Never Smoker   Smokeless Tobacco Never Used     Social History     Substance and Sexual Activity Alcohol Use Yes    Alcohol/week: 0.0 oz    Comment: socially     Social History     Substance and Sexual Activity   Drug Use No       Family History:  Family History   Problem Relation Age of Onset    Asthma Father     Hypertension Father    Annabelle Larch Pacemaker Father     Hypertension Mother     Breast Cancer Mother 61        Had genetic testing,  BRCA negative    Other Mother         knee replacements x2   Annabelle Larch Cancer Mother         non-Hodgkin's lymphoma    Miscarriages / Stillbirths Sister     Miscarriages / [place] Sister     Breast Cancer Maternal Grandmother 48    Lung Cancer Maternal Grandfather     Heart Disease Paternal Grandmother     Other Paternal Grandfather         aneurysm    Breast Cancer Other         Mother's maternal aunt    Breast Cancer Maternal Aunt     Breast Cancer Other         Mother's maternal aunt       REVIEW OF SYSTEMS:   Constitutional:  The patient denies any fevers or chills. Derm: Denies any rash or skin color changes. Eyes:  Denies blurred or decreased vision. ENT:  Denies any tinnitus or vertigo. Respiratory:  Denies any cough or shortness of breath. CV:  Denies any syncope, palpitations, or chest pain. GI: Denies any abdominal pain, nausea or vomiting, constipation or diarrhea. : Denies any hematuria or dysuria. Hematologic: Denies any bleeding. Musculoskeletal: She does have a notable right ganglion cyst noted to the volar side of the right wrist.  She does have some pain to palpation. Denies any numbness or tingling. Neurologic: Denies any paresthesia or weakness. PHYSICAL EXAM:  General:  This patient is alert and oriented x3, calm, cooperative, in no acute distress. HEENT:  Normocephalic, atraumatic. Conjunctivae clear. Neck:  Supple. Full range of motion. Respiratory:  Normal respiratory effort. No audible rhonchi or wheezes. Cardiovascular:  Regular rate and rhythm. Abdomen:  Soft, nontender.   Musculoskeletal:  She does have a notable quarter-sized cyst noted to the volar side of the right wrist.  It is slightly moveable. She does have some pain to palpation. Finger flexion and extension is intact. Sensation intact. Neurovascularly intact to the right hand. Skin is intact. No rashes, lesions or drainage. Notable lump to the right volar wrist. Neurovascularly intact. Sensation intact to the right hand. DATA:  CBC:   Lab Results   Component Value Date    WBC 4.9 04/08/2017    HGB 12.9 04/08/2017     04/08/2017     BMP:    Lab Results   Component Value Date     11/24/2018    K 3.8 11/24/2018     11/24/2018    CO2 27 11/24/2018    BUN 14 11/24/2018    CREATININE 0.71 11/24/2018    CALCIUM 9.2 11/24/2018    GLUCOSE 88 11/24/2018     PT/INR:  No results found for: PROTIME, INR  Troponin:  No results found for: TROPONINI  No results for input(s): LIPASE, AMYLASE in the last 72 hours. No results for input(s): AST, ALT, BILIDIR, BILITOT, ALKPHOS in the last 72 hours. Radiology:  No results found. ASSESSMENT:Active Problems:    * No active hospital problems. *  Resolved Problems:    * No resolved hospital problems. *   IMPRESSION:      Ganglion cyst of wrist, right    -  Primary       PLAN:  At this time, the patient would like to proceed with an excision of the right wrist ganglion cyst removal under general anesthetic.          Electronically signed by JENY Cui CNP on 5/21/2019 at 3827

## 2019-05-22 NOTE — OP NOTE
Terrance [address]                 [address] [place] [id number]                                OPERATIVE REPORT    PATIENT NAME: Pat Covarrubias                 :        1965  MED REC NO:   [id number]                             ROOM:  ACCOUNT NO:   [de-identified]                           ADMIT DATE: 2019  PROVIDER:     Shoshana Prieto    DATE OF PROCEDURE:  2019    PREOPERATIVE DIAGNOSIS:  Right wrist volar ganglion. POSTOPERATIVE DIAGNOSIS:  Right wrist volar ganglion. OPERATION PERFORMED:  Excision of ganglion, right wrist.    SURGEON:  Shoshana Prieto MD.    ASSISTANT:  None. ANESTHESIA:  General.    COMPLICATIONS:  None. BLOOD LOSS:  10 mL. INDICATIONS:  The patient is a 64-year-old who has developed a mass on  the volar side of her right wrist.  It is consistent with a ganglion. It is problematic. She decided to have it removed. I felt this is a  reasonable option. NARRATIVE:  The patient was taken to the operating room, underwent a  general anesthetic. Right upper extremity was prepped and draped in a  normal sterile fashion. Time-out was taken. Consent was confirmed. Tourniquet was inflated to 250 mmHg. I made about a 4 cm incision on  the volar aspect of the wrist of the ganglion. Skin knife followed by  electrocautery down to the ganglion, was identified. Radial artery was  protected. We dissected around, got all the way down to the stalk, it  was obviously a ganglion and had a gelatinous material in it. We then  got to the stump. There was not much to actually tie up. We cauterized  the area. We had excised. Tourniquet was let down. Bleeders were  cauterized. Wound was closed with nylon suture. We did inject local  anesthetic. The patient was then awakened, returned to the recovery  room in good condition. POSTOP PLAN:  Weightbearing as tolerated, dry dressings p.r.n.   First  postop visit will be in 2 to 3 weeks for clinical exam.  No x-rays. Ganglion was not sent off because it was felt to be benign.         Tara Manuel    D: 05/21/2019 10:11:34       T: 05/21/2019 13:13:04     [id number]  Job#: [id number]     Doc#: [id number]    CC:

## 2019-06-11 ENCOUNTER — OFFICE VISIT (OUTPATIENT)
Dept: ORTHOPEDIC SURGERY | Age: 54
End: 2019-06-11

## 2019-06-11 VITALS
HEIGHT: 64 IN | WEIGHT: 177 LBS | BODY MASS INDEX: 30.22 KG/M2 | SYSTOLIC BLOOD PRESSURE: 114 MMHG | DIASTOLIC BLOOD PRESSURE: 78 MMHG | HEART RATE: 66 BPM

## 2019-06-11 DIAGNOSIS — M67.431 GANGLION CYST OF WRIST, RIGHT: Primary | ICD-10-CM

## 2019-06-11 PROCEDURE — 99024 POSTOP FOLLOW-UP VISIT: CPT | Performed by: PHYSICIAN ASSISTANT

## 2019-06-12 NOTE — PROGRESS NOTES
[facility] [place] [address] ORTHOPEDICS  [facility]  Dept: [phone]  Loc: [phone]    Date of Service:  6/11/2019    Zachery Navarro  YOB: 1965  MRN: [id number]      Zachery Navarro is a pleasant 62 y.o. female who comes in today for followup of ganglion cyst removed on 05/29/2019. The patient is doing a lot better at this time. She denies any numbness or tingling at this point. PHYSICAL EXAM:  This is a 62 y.o. female, alert and oriented x3, cooperative, in no acute distress. Sensation intact. Neurovascularly intact. She has full range of motion of the wrist at this time. No palpable masses noted. Skin incision is well approximated, well healed. No rashes or lesions noted. IMPRESSION:  Status post ganglion cyst removal on 05/21/2019. PLAN:  We are going to have the patient follow up with us on an as-needed basis. Weight bear as tolerated. Ashely Grant am personally transcribing for Abiola Bond PA-C 6/12/19 at 1:05 PM.    IAbiola PA-C, personally performed the services described in this document as transcribed by Amy Aguilera, and it is both accurate and complete.     Electronically signed by Abiola Bond PA-C on 6/13/2019 at 7:27 AM

## 2019-06-15 ENCOUNTER — HOSPITAL ENCOUNTER (OUTPATIENT)
Dept: MAMMOGRAPHY | Age: 54
Discharge: HOME | End: 2019-06-17
Payer: COMMERCIAL

## 2019-06-15 DIAGNOSIS — Z12.31 VISIT FOR SCREENING MAMMOGRAM: ICD-10-CM

## 2019-06-15 PROCEDURE — 77063 BREAST TOMOSYNTHESIS BI: CPT

## 2019-07-10 ENCOUNTER — OFFICE VISIT (OUTPATIENT)
Dept: PULMONOLOGY | Age: 54
End: 2019-07-10
Payer: COMMERCIAL

## 2019-07-10 VITALS
SYSTOLIC BLOOD PRESSURE: 126 MMHG | OXYGEN SATURATION: 98 % | BODY MASS INDEX: 29.19 KG/M2 | HEART RATE: 82 BPM | HEIGHT: 64 IN | DIASTOLIC BLOOD PRESSURE: 80 MMHG | RESPIRATION RATE: 16 BRPM | WEIGHT: 171 LBS

## 2019-07-10 DIAGNOSIS — Z99.89 OSA ON CPAP: Primary | ICD-10-CM

## 2019-07-10 DIAGNOSIS — G47.33 OSA ON CPAP: Primary | ICD-10-CM

## 2019-07-10 PROCEDURE — 99213 OFFICE O/P EST LOW 20 MIN: CPT | Performed by: NURSE PRACTITIONER

## 2019-07-10 PROCEDURE — 1036F TOBACCO NON-USER: CPT | Performed by: NURSE PRACTITIONER

## 2019-07-10 PROCEDURE — 3017F COLORECTAL CA SCREEN DOC REV: CPT | Performed by: NURSE PRACTITIONER

## 2019-07-10 PROCEDURE — G8417 CALC BMI ABV UP PARAM F/U: HCPCS | Performed by: NURSE PRACTITIONER

## 2019-07-10 PROCEDURE — G8427 DOCREV CUR MEDS BY ELIG CLIN: HCPCS | Performed by: NURSE PRACTITIONER

## 2019-07-10 NOTE — PROGRESS NOTES
sclera clear, anicteric, oropharynx clear, no lesions, neck supple with midline trachea. Neck: Supple, symmetrical, trachea midline, no adenopathy, no goiter, no jvd skin normal  Respiratory: clear to auscultation, no wheezes or rales and unlabored breathing. No intercostal tenderness  Cardiovascular: regular rate and rhythm, normal S1, S2, no murmur noted  Abdomen: soft, nontender, nondistended, no masses or organomegaly  Extremities:  no pedal edema, no clubbing or cyanosis    Assessment:     Diagnosis Orders   1. YOJANA on CPAP  DME Order for CPAP as OP         PLAN:    CPAP to be used for at least 4 hours every night. Use humidifier if needed. Weight loss was recommended and discussed. Recommended following good sleep hygiene instructions. Given sleep hygiene instructions to the patient. Explained importance of compliance with treatment of sleep apnea. Compliance data was reviewed and the patient is in compliance per insurance requirement. DME order for CPAP tubing, mask and supplies provided.      We'll see the patient back in 12 months      Tabatha Medina CNP             7/10/2019, 1:18 PM

## 2019-10-29 ENCOUNTER — OFFICE VISIT (OUTPATIENT)
Dept: INTERNAL MEDICINE | Age: 54
End: 2019-10-29
Payer: COMMERCIAL

## 2019-10-29 VITALS
HEART RATE: 74 BPM | BODY MASS INDEX: 30.56 KG/M2 | HEIGHT: 64 IN | DIASTOLIC BLOOD PRESSURE: 78 MMHG | WEIGHT: 179 LBS | RESPIRATION RATE: 16 BRPM | SYSTOLIC BLOOD PRESSURE: 136 MMHG

## 2019-10-29 DIAGNOSIS — Z00.00 PERIODIC HEALTH ASSESSMENT, GENERAL SCREENING, ADULT: Primary | ICD-10-CM

## 2019-10-29 DIAGNOSIS — K21.9 GASTROESOPHAGEAL REFLUX DISEASE WITHOUT ESOPHAGITIS: ICD-10-CM

## 2019-10-29 DIAGNOSIS — G47.33 OSA ON CPAP: ICD-10-CM

## 2019-10-29 DIAGNOSIS — F33.1 MODERATE EPISODE OF RECURRENT MAJOR DEPRESSIVE DISORDER (HCC): ICD-10-CM

## 2019-10-29 DIAGNOSIS — Z99.89 OSA ON CPAP: ICD-10-CM

## 2019-10-29 PROCEDURE — 99214 OFFICE O/P EST MOD 30 MIN: CPT | Performed by: INTERNAL MEDICINE

## 2019-10-29 RX ORDER — RANITIDINE 150 MG/1
150 TABLET ORAL NIGHTLY
Qty: 30 TABLET | Refills: 2 | Status: SHIPPED | OUTPATIENT
Start: 2019-10-29 | End: 2020-06-17 | Stop reason: ALTCHOICE

## 2019-10-29 RX ORDER — OMEPRAZOLE 20 MG/1
20 CAPSULE, DELAYED RELEASE ORAL DAILY
Qty: 90 CAPSULE | Refills: 3 | Status: CANCELLED | OUTPATIENT
Start: 2019-10-29

## 2019-12-05 ENCOUNTER — HOSPITAL ENCOUNTER (OUTPATIENT)
Age: 54
Setting detail: SPECIMEN
Discharge: HOME | End: 2019-12-05
Payer: COMMERCIAL

## 2019-12-05 ENCOUNTER — OFFICE VISIT (OUTPATIENT)
Dept: OBGYN | Age: 54
End: 2019-12-05
Payer: COMMERCIAL

## 2019-12-05 VITALS
TEMPERATURE: 98.4 F | HEIGHT: 64 IN | WEIGHT: 180 LBS | BODY MASS INDEX: 30.73 KG/M2 | HEART RATE: 87 BPM | SYSTOLIC BLOOD PRESSURE: 110 MMHG | DIASTOLIC BLOOD PRESSURE: 70 MMHG

## 2019-12-05 DIAGNOSIS — Z01.419 WELL FEMALE EXAM WITH ROUTINE GYNECOLOGICAL EXAM: Primary | ICD-10-CM

## 2019-12-05 DIAGNOSIS — Z12.4 SCREENING FOR CERVICAL CANCER: ICD-10-CM

## 2019-12-05 DIAGNOSIS — Z91.89 INCREASED RISK OF BREAST CANCER: ICD-10-CM

## 2019-12-05 DIAGNOSIS — Z12.31 OTHER SCREENING MAMMOGRAM: ICD-10-CM

## 2019-12-05 DIAGNOSIS — Z80.3 FAMILY HISTORY OF BREAST CANCER: ICD-10-CM

## 2019-12-05 DIAGNOSIS — Z13.9 SCREENING FOR CONDITION: ICD-10-CM

## 2019-12-05 PROCEDURE — G0145 SCR C/V CYTO,THINLAYER,RESCR: HCPCS

## 2019-12-05 PROCEDURE — G8484 FLU IMMUNIZE NO ADMIN: HCPCS | Performed by: NURSE PRACTITIONER

## 2019-12-05 PROCEDURE — 99396 PREV VISIT EST AGE 40-64: CPT | Performed by: NURSE PRACTITIONER

## 2019-12-05 RX ORDER — TIZANIDINE 4 MG/1
TABLET ORAL
COMMUNITY
Start: 2015-07-10 | End: 2021-01-20

## 2019-12-11 LAB — CYTOLOGY REPORT: NORMAL

## 2019-12-16 ENCOUNTER — TELEPHONE (OUTPATIENT)
Dept: OBGYN | Age: 54
End: 2019-12-16

## 2019-12-18 ENCOUNTER — PATIENT MESSAGE (OUTPATIENT)
Dept: OBGYN | Age: 54
End: 2019-12-18

## 2019-12-31 ENCOUNTER — HOSPITAL ENCOUNTER (OUTPATIENT)
Dept: MRI IMAGING | Age: 54
Discharge: HOME | End: 2020-01-02
Payer: COMMERCIAL

## 2019-12-31 PROCEDURE — A9579 GAD-BASE MR CONTRAST NOS,1ML: HCPCS | Performed by: NURSE PRACTITIONER

## 2019-12-31 PROCEDURE — 6360000004 HC RX CONTRAST MEDICATION: Performed by: NURSE PRACTITIONER

## 2019-12-31 PROCEDURE — 2709999900 MRI BREAST BILATERAL W WO CONTRAST

## 2019-12-31 RX ADMIN — GADOTERIDOL 20 ML: 279.3 INJECTION, SOLUTION INTRAVENOUS at 11:24

## 2020-04-21 ENCOUNTER — PATIENT MESSAGE (OUTPATIENT)
Dept: INTERNAL MEDICINE | Age: 55
End: 2020-04-21

## 2020-04-21 RX ORDER — SERTRALINE HYDROCHLORIDE 100 MG/1
100 TABLET, FILM COATED ORAL DAILY
Qty: 90 TABLET | Refills: 3 | Status: SHIPPED | OUTPATIENT
Start: 2020-04-21 | End: 2021-04-06 | Stop reason: SDUPTHER

## 2020-05-01 ENCOUNTER — TELEPHONE (OUTPATIENT)
Dept: INTERNAL MEDICINE | Age: 55
End: 2020-05-01

## 2020-06-11 ENCOUNTER — HOSPITAL ENCOUNTER (OUTPATIENT)
Dept: LAB | Age: 55
Discharge: HOME | End: 2020-06-11
Payer: COMMERCIAL

## 2020-06-11 LAB
ABSOLUTE EOS #: 0.39 K/UL (ref 0–0.44)
ABSOLUTE IMMATURE GRANULOCYTE: <0.03 K/UL (ref 0–0.3)
ABSOLUTE LYMPH #: 1.44 K/UL (ref 1.1–3.7)
ABSOLUTE MONO #: 0.36 K/UL (ref 0.1–1.2)
ALBUMIN SERPL-MCNC: 4.6 G/DL (ref 3.5–5.2)
ALBUMIN/GLOBULIN RATIO: 1.7 (ref 1–2.5)
ALP BLD-CCNC: 91 U/L (ref 35–104)
ALT SERPL-CCNC: 19 U/L (ref 5–33)
ANION GAP SERPL CALCULATED.3IONS-SCNC: 11 MMOL/L (ref 9–17)
AST SERPL-CCNC: 18 U/L
BASOPHILS # BLD: 1 % (ref 0–2)
BASOPHILS ABSOLUTE: 0.06 K/UL (ref 0–0.2)
BILIRUB SERPL-MCNC: 0.34 MG/DL (ref 0.3–1.2)
BUN BLDV-MCNC: 13 MG/DL (ref 6–20)
BUN/CREAT BLD: 15 (ref 9–20)
CALCIUM SERPL-MCNC: 10 MG/DL (ref 8.6–10.4)
CHLORIDE BLD-SCNC: 102 MMOL/L (ref 98–107)
CHOLESTEROL, FASTING: 140 MG/DL
CHOLESTEROL/HDL RATIO: 2.7
CO2: 28 MMOL/L (ref 20–31)
CREAT SERPL-MCNC: 0.86 MG/DL (ref 0.5–0.9)
DIFFERENTIAL TYPE: ABNORMAL
EOSINOPHILS RELATIVE PERCENT: 8 % (ref 1–4)
GFR AFRICAN AMERICAN: >60 ML/MIN
GFR NON-AFRICAN AMERICAN: >60 ML/MIN
GFR SERPL CREATININE-BSD FRML MDRD: NORMAL ML/MIN/{1.73_M2}
GFR SERPL CREATININE-BSD FRML MDRD: NORMAL ML/MIN/{1.73_M2}
GLUCOSE BLD-MCNC: 96 MG/DL (ref 70–99)
HCT VFR BLD CALC: 37.1 % (ref 36.3–47.1)
HDLC SERPL-MCNC: 51 MG/DL
HEMOGLOBIN: 12.4 G/DL (ref 11.9–15.1)
IMMATURE GRANULOCYTES: 0 %
LDL CHOLESTEROL: 41 MG/DL (ref 0–130)
LYMPHOCYTES # BLD: 28 % (ref 24–43)
MCH RBC QN AUTO: 29.2 PG (ref 25.2–33.5)
MCHC RBC AUTO-ENTMCNC: 33.4 G/DL (ref 25.2–33.5)
MCV RBC AUTO: 87.3 FL (ref 82.6–102.9)
MONOCYTES # BLD: 7 % (ref 3–12)
NRBC AUTOMATED: 0 PER 100 WBC
PDW BLD-RTO: 13.1 % (ref 11.8–14.4)
PLATELET # BLD: 232 K/UL (ref 138–453)
PLATELET ESTIMATE: ABNORMAL
PMV BLD AUTO: 9.1 FL (ref 8.1–13.5)
POTASSIUM SERPL-SCNC: 4.1 MMOL/L (ref 3.7–5.3)
RBC # BLD: 4.25 M/UL (ref 3.95–5.11)
RBC # BLD: ABNORMAL 10*6/UL
SEG NEUTROPHILS: 56 % (ref 36–65)
SEGMENTED NEUTROPHILS ABSOLUTE COUNT: 2.97 K/UL (ref 1.5–8.1)
SODIUM BLD-SCNC: 141 MMOL/L (ref 135–144)
TOTAL PROTEIN: 7.3 G/DL (ref 6.4–8.3)
TRIGLYCERIDE, FASTING: 239 MG/DL
VLDLC SERPL CALC-MCNC: ABNORMAL MG/DL (ref 1–30)
WBC # BLD: 5.2 K/UL (ref 3.5–11.3)
WBC # BLD: ABNORMAL 10*3/UL

## 2020-06-11 PROCEDURE — 36415 COLL VENOUS BLD VENIPUNCTURE: CPT

## 2020-06-11 PROCEDURE — 80061 LIPID PANEL: CPT

## 2020-06-11 PROCEDURE — 80053 COMPREHEN METABOLIC PANEL: CPT

## 2020-06-11 PROCEDURE — 85025 COMPLETE CBC W/AUTO DIFF WBC: CPT

## 2020-06-17 ENCOUNTER — OFFICE VISIT (OUTPATIENT)
Dept: INTERNAL MEDICINE | Age: 55
End: 2020-06-17
Payer: COMMERCIAL

## 2020-06-17 ENCOUNTER — HOSPITAL ENCOUNTER (OUTPATIENT)
Dept: MAMMOGRAPHY | Age: 55
Discharge: HOME | End: 2020-06-19
Payer: COMMERCIAL

## 2020-06-17 VITALS
HEART RATE: 78 BPM | SYSTOLIC BLOOD PRESSURE: 122 MMHG | WEIGHT: 183 LBS | RESPIRATION RATE: 16 BRPM | TEMPERATURE: 97.5 F | HEIGHT: 64 IN | BODY MASS INDEX: 31.24 KG/M2 | DIASTOLIC BLOOD PRESSURE: 72 MMHG

## 2020-06-17 PROCEDURE — G8427 DOCREV CUR MEDS BY ELIG CLIN: HCPCS | Performed by: INTERNAL MEDICINE

## 2020-06-17 PROCEDURE — 99214 OFFICE O/P EST MOD 30 MIN: CPT | Performed by: INTERNAL MEDICINE

## 2020-06-17 PROCEDURE — G8417 CALC BMI ABV UP PARAM F/U: HCPCS | Performed by: INTERNAL MEDICINE

## 2020-06-17 PROCEDURE — 3017F COLORECTAL CA SCREEN DOC REV: CPT | Performed by: INTERNAL MEDICINE

## 2020-06-17 PROCEDURE — 1036F TOBACCO NON-USER: CPT | Performed by: INTERNAL MEDICINE

## 2020-06-17 PROCEDURE — 77063 BREAST TOMOSYNTHESIS BI: CPT

## 2020-06-17 NOTE — PROGRESS NOTES
12/05/2022    Colon cancer screen colonoscopy  04/25/2024    Lipid screen  06/11/2025    DTaP/Tdap/Td vaccine (2 - Td) 01/11/2027    Shingles Vaccine  Completed    Hepatitis C screen  Completed    HIV screen  Completed    Hepatitis A vaccine  Aged Out    Hepatitis B vaccine  Aged Out    Hib vaccine  Aged Out    Meningococcal (ACWY) vaccine  Aged Out    Pneumococcal 0-64 years Vaccine  Aged Out           Objective:     PHYSICAL EXAM  /72 (Site: Left Upper Arm, Position: Sitting, Cuff Size: Medium Adult)   Pulse 78   Temp 97.5 °F (36.4 °C) (Infrared)   Resp 16   Ht 5' 4.02\" (1.626 m)   Wt 183 lb (83 kg)   BMI 31.40 kg/m²   Constitutional: No acute distress. Sits in chair comfortably  Eyes: Sclerae nonicteric. No lid lag or proptosis  HENT: External ears normal. No external lesions on the nose  Neck: No gross masses. Trachea visibly midline  Respiratory: Good air entry bilaterally. No wheezing or crackles  Cardiovascular: Normal S1-S2. No murmurs. No lower extremity edema  Gastrointestinal: No visible masses. No visible hernias  Skin: No abnormal rashes. No abnormal masses  Neurologic: Cranial nerves grossly intact  Psychiatric: Normal affect. Alert and oriented        Labs Reviewed 6/17/2020:    Lab Results   Component Value Date    WBC 5.2 06/11/2020    HGB 12.4 06/11/2020    HCT 37.1 06/11/2020     06/11/2020    CHOL 123 11/24/2018    TRIG 155 (H) 11/24/2018    HDL 51 06/11/2020    ALT 19 06/11/2020    AST 18 06/11/2020     06/11/2020    K 4.1 06/11/2020     06/11/2020    CREATININE 0.86 06/11/2020    BUN 13 06/11/2020    CO2 28 06/11/2020    TSH 1.73 09/26/2016       Plan        YOJANA: Continue CPAP. Stable. Depression: Stable on Zoloft. Is not interested in cutting down. Will continue. Reassess next week. GERD: Continue Tagamet. Stable and asymptomatic at this moment. Diagnosis Orders   1.  Periodic health assessment, general screening, adult  CBC Auto

## 2020-08-12 ENCOUNTER — OFFICE VISIT (OUTPATIENT)
Dept: PULMONOLOGY | Age: 55
End: 2020-08-12
Payer: COMMERCIAL

## 2020-08-12 VITALS
HEIGHT: 64 IN | DIASTOLIC BLOOD PRESSURE: 70 MMHG | BODY MASS INDEX: 30.83 KG/M2 | HEART RATE: 78 BPM | WEIGHT: 180.6 LBS | SYSTOLIC BLOOD PRESSURE: 120 MMHG | OXYGEN SATURATION: 98 % | TEMPERATURE: 96.9 F

## 2020-08-12 PROCEDURE — 99212 OFFICE O/P EST SF 10 MIN: CPT | Performed by: NURSE PRACTITIONER

## 2020-08-12 PROCEDURE — 1036F TOBACCO NON-USER: CPT | Performed by: NURSE PRACTITIONER

## 2020-08-12 PROCEDURE — 3017F COLORECTAL CA SCREEN DOC REV: CPT | Performed by: NURSE PRACTITIONER

## 2020-08-12 PROCEDURE — G8417 CALC BMI ABV UP PARAM F/U: HCPCS | Performed by: NURSE PRACTITIONER

## 2020-08-12 PROCEDURE — G8427 DOCREV CUR MEDS BY ELIG CLIN: HCPCS | Performed by: NURSE PRACTITIONER

## 2020-08-12 ASSESSMENT — ENCOUNTER SYMPTOMS
RESPIRATORY NEGATIVE: 1
ALLERGIC/IMMUNOLOGIC NEGATIVE: 1
EYES NEGATIVE: 1
GASTROINTESTINAL NEGATIVE: 1

## 2020-08-12 NOTE — PROGRESS NOTES
afternoon when circumstances permit 1   Sitting and talking to someone 0   Sitting quietly after a lunch without alcohol 0   In a car, while stopped for a few minutes in traffic 3   Total score 8       /70 (Site: Left Upper Arm, Position: Sitting, Cuff Size: Medium Adult)   Pulse 78   Temp 96.9 °F (36.1 °C)   Ht 5' 4.02\" (1.626 m)   Wt 180 lb 9.6 oz (81.9 kg)   SpO2 98%   BMI 30.98 kg/m²     Past Medical History:   Diagnosis Date    Allergic rhinitis     Anxiety     Originally , with panic    Chronic tension headaches     occasional    Depression     Zoloft started     DUB (dysfunctional uterine bleeding)     ablation    Fatigue     GERD (gastroesophageal reflux disease)     No prior scope as of PE 16- mild sx over yrs managed without meds    Hepatitis A 1969    age 8   Dm Remedies Internal hemorrhoids     on scope     Lateral epicondylitis 3/11/2014    resolved    Low back pain     YOJANA on CPAP     sleep study  CPAP 12    Thyroid nodule     u/s  stable sub cm     Uterine fibroid      Past Surgical History:   Procedure Laterality Date     SECTION      COLONOSCOPY  2014    Zoë Leahy    DILATION AND CURETTAGE  11 and 2008    Dr Gloria Traore    DILATION AND CURETTAGE OF UTERUS  13    ENDOMETRIAL ABLATION  13    EXCISION LESION HAND / FINGER Right 2019    Excision Right Wrist ganglion cyst performed by Burnadette Brunner, MD at [address] [place] [address] HYSTEROSCOPY  13    Dr Gloria Traore    HYSTEROSCOPY  13    WISDOM TOOTH EXTRACTION       Family History   Problem Relation Age of Onset    Asthma Father     Hypertension Father     Pacemaker Father     Hypertension Mother     Breast Cancer Mother 61        Had genetic testing,  BRCA negative    Other Mother         knee replacements x2    Cancer Mother         non-Hodgkin's lymphoma    Miscarriages / Stillbirths Sister     Miscarriages / Stillbirths Sister     Breast Cancer Maternal Grandmother 48    Lung Cancer Maternal Grandfather     Heart Disease Paternal Grandmother     Other Paternal Grandfather         aneurysm    Breast Cancer Other         Mother's maternal aunt    Breast Cancer Maternal Aunt     Breast Cancer Other         Mother's maternal aunt       Social History     Socioeconomic History    Marital status:      Spouse name: Not on file    Number of children: 3    Years of education: Not on file    Highest education level: Not on file   Occupational History    Occupation: admin     Employer: [employer]   Social Needs    Financial resource strain: Not on file    Food insecurity     Worry: Not on file     Inability: Not on file   [employer] needs     Medical: Not on file     Non-medical: Not on file   Tobacco Use    Smoking status: Never Smoker    Smokeless tobacco: Never Used   Substance and Sexual Activity    Alcohol use: Yes     Alcohol/week: 0.0 standard drinks     Comment: socially    Drug use: No    Sexual activity: Yes     Partners: Male     Comment: Works at [employer]. Lifestyle    Physical activity     Days per week: Not on file     Minutes per session: Not on file    Stress: Not on file   Relationships    Social connections     Talks on phone: Not on file     Gets together: Not on file     Attends [employer] service: Not on file     Active member of club or organization: Not on file     Attends meetings of clubs or organizations: Not on file     Relationship status: Not on file    Intimate partner violence     Fear of current or ex partner: Not on file     Emotionally abused: Not on file     Physically abused: Not on file     Forced sexual activity: Not on file   Other Topics Concern    Not on file   Social History Narrative    Not on file       Review of Systems   Constitutional:        Residual daytime fatigue despite CPAP use. HENT: Negative. Eyes: Negative. Respiratory: Negative. Cardiovascular: Negative. Gastrointestinal: Negative. Endocrine: Negative. Genitourinary: Negative. Musculoskeletal: Negative. Skin: Negative. Allergic/Immunologic: Negative. Neurological: Negative. Hematological: Negative. Psychiatric/Behavioral: Negative.         Objective:       Physical Exam  General appearance - alert, well appearing, and in no distress, oriented to person, place, and time and normal appearing weight  Mental status - alert, oriented to person, place, and time, normal mood, behavior, speech, dress, motor activity, and thought processes  Eyes - pupils equal and reactive, extraocular eye movements intact  Ears -not examined  Nose - normal and patent, no erythema, discharge or polyps  Mouth - mucous membranes moist, pharynx normal without lesions  Neck - supple, no significant adenopathy  Chest - clear to auscultation, no wheezes, rales or rhonchi, symmetric air entry  Heart -normal rate, regular rhythm, normal S1, S2, no murmurs, rubs, clicks or gallops  Abdomen - soft, nontender, nondistended, no masses or organomegaly  Neuro- alert, oriented, normal speech, no focal findings or movement disorder noted}  Extremities - peripheral pulses normal, no pedal edema, no clubbing or cyanosis  Skin - normal coloration and turgor, no rashes, no suspicious skin lesions noted     Wt Readings from Last 3 Encounters:   08/12/20 180 lb 9.6 oz (81.9 kg)   06/17/20 183 lb (83 kg)   12/05/19 180 lb (81.6 kg)       Results for orders placed or performed during the hospital encounter of 06/11/20   Lipid, Fasting   Result Value Ref Range    Cholesterol, Fasting 140 <200 mg/dL    HDL 51 >40 mg/dL    LDL Cholesterol 41 0 - 130 mg/dL    Chol/HDL Ratio 2.7 <5    Triglyceride, Fasting 239 (H) <150 mg/dL    VLDL NOT REPORTED (H) 1 - 30 mg/dL   Comprehensive Metabolic Panel   Result Value Ref Range    Glucose 96 70 - 99 mg/dL    BUN 13 6 - 20 mg/dL    CREATININE 0.86 0.50 - 0.90 mg/dL    Bun/Cre Ratio 15 9 - 20    Calcium 10.0 8.6 - 10.4 mg/dL    Sodium 141 135 - 144 mmol/L    Potassium 4.1 3.7 - 5.3 mmol/L    Chloride 102 98 - 107 mmol/L    CO2 28 20 - 31 mmol/L    Anion Gap 11 9 - 17 mmol/L    Alkaline Phosphatase 91 35 - 104 U/L    ALT 19 5 - 33 U/L    AST 18 <32 U/L    Total Bilirubin 0.34 0.3 - 1.2 mg/dL    Total Protein 7.3 6.4 - 8.3 g/dL    Alb 4.6 3.5 - 5.2 g/dL    Albumin/Globulin Ratio 1.7 1.0 - 2.5    GFR Non-African American >60 >60 mL/min    GFR African American >60 >60 mL/min    GFR Comment          GFR Staging NOT REPORTED    CBC Auto Differential   Result Value Ref Range    WBC 5.2 3.5 - 11.3 k/uL    RBC 4.25 3.95 - 5.11 m/uL    Hemoglobin 12.4 11.9 - 15.1 g/dL    Hematocrit 37.1 36.3 - 47.1 %    MCV 87.3 82.6 - 102.9 fL    MCH 29.2 25.2 - 33.5 pg    MCHC 33.4 25.2 - 33.5 g/dL    RDW 13.1 11.8 - 14.4 %    Platelets 836 457 - 637 k/uL    MPV 9.1 8.1 - 13.5 fL    NRBC Automated 0.0 0.0 per 100 WBC    Differential Type NOT REPORTED     Seg Neutrophils 56 36 - 65 %    Lymphocytes 28 24 - 43 %    Monocytes 7 3 - 12 %    Eosinophils % 8 (H) 1 - 4 %    Basophils 1 0 - 2 %    Immature Granulocytes 0 0 %    Segs Absolute 2.97 1.50 - 8.10 k/uL    Absolute Lymph # 1.44 1.10 - 3.70 k/uL    Absolute Mono # 0.36 0.10 - 1.20 k/uL    Absolute Eos # 0.39 0.00 - 0.44 k/uL    Basophils Absolute 0.06 0.00 - 0.20 k/uL    Absolute Immature Granulocyte <0.03 0.00 - 0.30 k/uL    WBC Morphology NOT REPORTED     RBC Morphology NOT REPORTED     Platelet Estimate NOT REPORTED        No results found. Assessment:      1. YOJANA on CPAP          Plan:      1. Discussed with patient she may need to have an M SLT study due to the residual daytime fatigue. Patient at this time would like to defer as she does not feel that her daytime fatigue is that severe nor is it interfering with her quality of life. She also notes that she is retiring in the next 14 months. 2. Educated and clarified the medication use.   3. Recommend flu vaccination in the fall annually. 4. Patient is up-to-date with vaccinations from pulmonary perspective. 5. Maintain an active lifestyle. 6. Patient's questions were answered to her satisfaction. 7. CPAP/BiPAP to be used for at least 4 hours every night. Use humidifier if needed. Weight loss was recommended and discussed. Recommended following good sleep hygiene instructions. Sleep hygiene instructions given to the patient. Explained importance of compliance with treatment of sleep apnea. Compliance data was reviewed and the patient is  compliant per insurance requirement. 8. We'll see the patient back in 12 months, with Dr. Daisy Trujillo per patient's preference.         Electronically signed by JENY Del Rio CNP on 8/12/2020 at 4:08 PM

## 2020-12-04 ENCOUNTER — PATIENT MESSAGE (OUTPATIENT)
Dept: OBGYN | Age: 55
End: 2020-12-04

## 2020-12-04 NOTE — TELEPHONE ENCOUNTER
From: James Alvarado  To: Dustin Marquez, APRN - CNP  Sent: 12/4/2020 10:50 AM EST  Subject: Non-Urgent Medical Question    I had my last breast MRI on December 31, 2019. I thought Jessica Mcmillan scheduled one for me this year. I don't see it in my upcoming visits. Can you check on this please?     Thanks,    Fanny Jama

## 2020-12-07 NOTE — TELEPHONE ENCOUNTER
Please call patient and let her know I have placed the order for breast MRI, and see that it is scheduled. Give her the diagnosis codes and encourage her to confirm coverage with her insurance company. These are the codes used: Increased risk of breast cancer [Z91.89 (ICD-10-CM)]; Family history of breast cancer [Z80.3 (ICD-10-CM)]     Please remind her of appointment for pap later this week.

## 2020-12-09 ENCOUNTER — HOSPITAL ENCOUNTER (OUTPATIENT)
Dept: LAB | Age: 55
Discharge: HOME | End: 2020-12-09
Payer: COMMERCIAL

## 2020-12-09 ENCOUNTER — OFFICE VISIT (OUTPATIENT)
Dept: OBGYN | Age: 55
End: 2020-12-09
Payer: COMMERCIAL

## 2020-12-09 VITALS
BODY MASS INDEX: 36.12 KG/M2 | HEART RATE: 88 BPM | TEMPERATURE: 97.3 F | SYSTOLIC BLOOD PRESSURE: 130 MMHG | DIASTOLIC BLOOD PRESSURE: 80 MMHG | OXYGEN SATURATION: 98 % | HEIGHT: 60 IN | WEIGHT: 184 LBS

## 2020-12-09 LAB — VITAMIN D 25-HYDROXY: 68.4 NG/ML (ref 30–100)

## 2020-12-09 PROCEDURE — 99396 PREV VISIT EST AGE 40-64: CPT | Performed by: NURSE PRACTITIONER

## 2020-12-09 PROCEDURE — 36415 COLL VENOUS BLD VENIPUNCTURE: CPT

## 2020-12-09 PROCEDURE — 82306 VITAMIN D 25 HYDROXY: CPT

## 2020-12-09 PROCEDURE — G8484 FLU IMMUNIZE NO ADMIN: HCPCS | Performed by: NURSE PRACTITIONER

## 2020-12-09 PROCEDURE — G0145 SCR C/V CYTO,THINLAYER,RESCR: HCPCS

## 2020-12-09 NOTE — PROGRESS NOTES
Colt Began  2020              60 y.o. Chief Complaint   Patient presents with    Gynecologic Exam     pap/may         No LMP recorded (lmp unknown). Patient has had an ablation. No referring provider defined for this encounter. HPI :Annual Exam  Patient presents for annual exam.  Counseling on healthy lifestyle reviewed, as well as the need for self breast exam. We reviewed the need for Kegal exercises and literature was provided. We discussed and reviewed the need for routine screenings and immunization updates when appropriate. Known to be at high risk for breast cancer and has annual MRI of breast.  Was give codes, and will be confirming with insurance regarding reimbursement. Performs monthly self breast exams. Denies vaginal bleeding or spotting and is aware of the need to report any she would note in the future. Has been under a lot of stress with 's illness and other family illnesses. Complaining of pain both lower legs past several months. Will address with Dr. Samantha Jones at upcoming appointment. The patient is sexually active.     last pap: was normal, last mammogram: was normal  The patient has regular exercise: no . We did review the need for and frequency of both weight bearing and strengthening as tolerated by the patient. ________________________________________________________________________  OB History    Para Term  AB Living   4 3 3 0 1 3   SAB TAB Ectopic Molar Multiple Live Births   1 0 0 0 0 0      # Outcome Date GA Lbr Valentin/2nd Weight Sex Delivery Anes PTL Lv   4 Term 1993    M       3 SAB 1992           2 Term 1988    F       1 Term 1985    M         Past Medical History:   Diagnosis Date    Allergic rhinitis     Anxiety     Originally , with panic    Chronic tension headaches     occasional    Depression     Zoloft started     DUB (dysfunctional uterine bleeding)     ablation    Fatigue     GERD (gastroesophageal reflux disease)     No prior scope as of PE 16- mild sx over yrs managed without meds    Hepatitis A 1969    age 8    Internal hemorrhoids     on scope     Lateral epicondylitis 3/11/2014    resolved    Low back pain     YOJANA on CPAP     sleep study  CPAP 12    Thyroid nodule     u/s  stable sub cm     Trauma     Uterine fibroid                                                                    Past Surgical History:   Procedure Laterality Date     SECTION      COLONOSCOPY  2014    Zoë Leahy    DILATION AND CURETTAGE  13 and 2008    Dr Isis Santiago UTERUS  13    ENDOMETRIAL ABLATION  13    EXCISION LESION HAND / FINGER Right 2019    Excision Right Wrist ganglion cyst performed by Cristi Hodge [facility] at [facility]  13    Dr Lynn Licea    HYSTEROSCOPY  13    WISDOM TOOTH EXTRACTION       Family History   Problem Relation Age of Onset   [facility] Asthma Father     Hypertension Father     Pacemaker Father     Stroke Father     Hypertension Mother    Bentley Pickering Mother 61        Had genetic testing,  BRCA negative    Other Mother         knee replacements x2    Cancer Mother         non-Hodgkin's lymphoma    Miscarriages / Stillbirths Sister     Endometrial Cancer Sister 61        Stage IV  (2019)    Miscarriages / Stillbirths Sister     Breast Cancer Maternal Grandmother 48    Lung Cancer Maternal Grandfather     Heart Disease Paternal Grandmother     Other Paternal Grandfather         aneurysm    Breast Cancer Other         Mother's maternal aunt    Breast Cancer Maternal Aunt     Breast Cancer Other         Mother's maternal aunt     Social History     Socioeconomic History    Marital status:      Spouse name: Not on file    Number of children: 3    Years of education: Not on file    Highest education level: Not on file   Occupational History    Occupation: admin     Employer: [employer]   Social Needs    Financial resource strain: Not on file    Food insecurity     Worry: Not on file     Inability: Not on file   [employer] needs     Medical: Not on file     Non-medical: Not on file   Tobacco Use    Smoking status: Never Smoker    Smokeless tobacco: Never Used   Substance and Sexual Activity    Alcohol use: Yes     Alcohol/week: 0.0 standard drinks     Comment: socially    Drug use: No    Sexual activity: Yes     Partners: Male     Comment: Works at [employer]. Lifestyle    Physical activity     Days per week: Not on file     Minutes per session: Not on file    Stress: Not on file   Relationships    Social connections     Talks on phone: Not on file     Gets together: Not on file     Attends Yarsani service: Not on file     Active member of club or organization: Not on file     Attends meetings of clubs or organizations: Not on file     Relationship status: Not on file    Intimate partner violence     Fear of current or ex partner: Not on file     Emotionally abused: Not on file     Physically abused: Not on file     Forced sexual activity: Not on file   Other Topics Concern    Not on file   Social History Narrative    Not on file       MEDICATIONS:  Current Outpatient Medications   Medication Sig Dispense Refill    sertraline (ZOLOFT) 100 MG tablet Take 1 tablet by mouth daily 90 tablet 3    Multiple Vitamins-Minerals (MULTIVITAL PO) Take 1 tablet by mouth daily as needed       Cholecalciferol (VITAMIN D) 2000 UNITS CAPS capsule Take 2,000 Units by mouth daily       cimetidine (TAGAMET) 200 MG tablet Take 1 tablet by mouth 2 times daily as needed (heartburn) 60 tablet 3    tiZANidine (ZANAFLEX) 4 MG tablet Take 4 mg by mouth 3 times daily as needed       No current facility-administered medications for this visit.         ALLERGIES:  Allergies as of 12/09/2020 - Review Complete 12/09/2020   Allergen Reaction Noted    Doxycycline 09/10/2018    Aspirin Rash 07/23/2013    Penicillins Swelling and Rash 07/23/2013     Gynecologic History:  Menarche: 12   Menopause at ablation      No LMP recorded (lmp unknown). Patient has had an ablation. Hormone Exposure: No    Family History of Breast, Ovarian , Colon or Uterine Cancer: Yes      Preventative Health Testing:  Date of Last Pap Smear: 2019  Date of Last Mammogram: 2020  Date of Last Colonoscopy: 2014    ________________________________________________________________________      Review Of Systems:  General ROS:  negative  Hematological and Lymphatic ROS:negative   Breast ROS: negative  Cardiovascular ROS: negative  Respiratory ROS: negative   Gastrointestinal ROS: negative  Genito-Urinary ROS: negative  Psychological ROS: negative  Neurological ROS: negative  Musculoskeletal ROS: negative  Dermatological ROS: negative                                                                                                                                                                                   PHYSICAL Exam:     Constitutional:  Blood pressure 130/80, pulse 88, temperature 97.3 °F (36.3 °C), height 5' (1.524 m), weight 184 lb (83.5 kg), SpO2 98 %, not currently breastfeeding. General Appearance: This  is a well Developed, well Nourished, well groomed female. Her BMI was reviewed. Skin:  There was a Normal Inspection of the skin without rashes or lesions. Lymphatic:  No Lymph Nodes were Palpable in the neck , axilla or groin. Neck and EENT:  The neck was supple. There were no masses   The thyroid was not enlarged and had no masses. PERRLA, Nares Patent No Masses    Respiratory: The lungs were auscultated and found to be clear. There were no rales, rhonchi or wheezes. There was a good respiratory effort. Cardiovascular: The heart was in a regular rate and rhythm. There was no murmur appreciated. Extremities:   The patients extremities were without calf tenderness, edema, or varicosities. There was full range of motion in all four extremities. Pulses in all four extremities    Abdomen: The abdomen was soft and non-tender. There was no guarding, rebound or rigidity. On evaluation there was no evidence of hepatosplenomegaly and there was no costal vertebral nuzhat tenderness bilaterally. No hernias were appreciated. Psych: The patient had a normal Orientation to: Time, Place, Person, and Situation  Mood and affect appropriate    Breast:  (Chest)  normal appearance, no masses or tenderness, Inspection negative, No nipple retraction or dimpling, No nipple discharge or bleeding, No axillary or supraclavicular adenopathy, Normal to palpation without dominant masses      Pelvic Exam:  External genitalia: normal general appearance  Urinary system: urethral meatus normal  Vaginal: normal mucosa without prolapse or lesions, normal without tenderness, induration or masses and normal rugae  Cervix: normal appearance and thin prep PAP obtained  Adnexa: normal bimanual exam and non palpable  Uterus: normal single, nontender    Musculosk:  Normal Gait and station was noted. Digits were evaluated without abnormal findings. Range of motion, stability and strength were evaluated and found to be appropriate for the patients age. ASSESSMENT:      60 y.o. Annual   Diagnosis Orders   1. Well female exam with routine gynecological exam     2. Other screening mammogram  PAP SMEAR    KARINA CYNTHIA DIGITAL SCREEN BILATERAL   3. Screening for cervical cancer     4.  Encounter for vitamin deficiency screening  Vitamin D 25 Hydroxy        Chief Complaint   Patient presents with    Gynecologic Exam     pap/karina         Past Medical History:   Diagnosis Date    Allergic rhinitis     Anxiety     Originally 1990's, with panic    Chronic tension headaches     occasional    Depression     Zoloft started 1990's    DUB (dysfunctional uterine bleeding)     ablation    Fatigue     GERD (gastroesophageal reflux disease)     No prior scope as of PE 16- mild sx over yrs managed without meds    Hepatitis A 1969    age 8    Internal hemorrhoids     on scope     Lateral epicondylitis 3/11/2014    resolved    Low back pain     YOJANA on CPAP     sleep study  CPAP 12    Thyroid nodule     u/s  stable sub cm     Trauma     Uterine fibroid          Patient Active Problem List   Diagnosis    GERD (gastroesophageal reflux disease)    Fatigue    Allergic rhinitis    Low back pain    Other disorder of menstruation and other abnormal bleeding from female genital tract    Depression    YOJANA on CPAP    Animal bite of finger    Ganglion cyst    S/P excision of ganglion cyst    Increased risk of breast cancer    Family history of breast cancer          Hereditary Breast, Ovarian, Colon and Uterine Cancer screening Done. Tobacco & Secondary smoke risks reviewed; instructed on cessation and avoidance    PLAN:  Return in about 1 year (around 2021) for Annual Exam, Mammogram.  Return for annual exams  Mammograms every 1 year. If 33 yo and last mammogram was negative. Routine health maintenance per patients PCP. Orders Placed This Encounter   Procedures    KARINA CYNTHIA DIGITAL SCREEN BILATERAL     Standing Status:   Future     Standing Expiration Date:   2022     Order Specific Question:   Reason for exam:     Answer:   screening    PAP SMEAR     Patient History:    No LMP recorded (lmp unknown). Patient has had an ablation.   OBGYN Status: Ablation  Past Surgical History:  No date:  SECTION  2014: COLONOSCOPY      Comment:  Annabelle Gardner  13 and 2008: DILATION AND CURETTAGE      Comment:  Dr Edita Alanis  13: DILATION AND CURETTAGE OF UTERUS  13: ENDOMETRIAL ABLATION  2019: EXCISION LESION HAND / FINGER; Right      Comment:  Excision Right Wrist ganglion cyst performed by Kierra Sam MD at [facility] OR  13: HYSTEROSCOPY Comment:  Dr Ute Domínguez  12/17/13: HYSTEROSCOPY  No date: WISDOM TOOTH EXTRACTION    Problem List       Edg Problems Affecting Cytology    Increased risk of breast cancer     Social History    Tobacco Use      Smoking status: Never Smoker      Smokeless tobacco: Never Used       Standing Status:   Future     Number of Occurrences:   1     Standing Expiration Date:   12/24/2021     Order Specific Question:   Collection Type     Answer: Thin Prep     Order Specific Question:   Prior Abnormal Pap Test     Answer:   Yes     Order Specific Question:   If Prior Abnormal, Give Date     Answer:   1990's     Order Specific Question:   Prior Treatment     Answer: Other     Comments:   colp     Order Specific Question:   Screening or Diagnostic     Answer:   Screening     Order Specific Question:   HPV Requested?      Answer:   Yes - If Abnormal Reflex HPV     Order Specific Question:   High Risk Patient     Answer:   N/A    Vitamin D 25 Hydroxy     Standing Status:   Future     Number of Occurrences:   1     Standing Expiration Date:   3/9/2021       Cedric Solano  12/9/2020

## 2020-12-18 LAB — CYTOLOGY REPORT: NORMAL

## 2020-12-23 ENCOUNTER — HOSPITAL ENCOUNTER (OUTPATIENT)
Dept: MRI IMAGING | Age: 55
Discharge: HOME | End: 2020-12-25
Payer: COMMERCIAL

## 2020-12-23 PROCEDURE — A9579 GAD-BASE MR CONTRAST NOS,1ML: HCPCS | Performed by: NURSE PRACTITIONER

## 2020-12-23 PROCEDURE — 6360000004 HC RX CONTRAST MEDICATION: Performed by: NURSE PRACTITIONER

## 2020-12-23 PROCEDURE — 2709999900 MRI BREAST BILATERAL W WO CONTRAST

## 2020-12-23 RX ADMIN — GADOTERIDOL 20 ML: 279.3 INJECTION, SOLUTION INTRAVENOUS at 14:00

## 2020-12-30 ENCOUNTER — PATIENT MESSAGE (OUTPATIENT)
Dept: OBGYN | Age: 55
End: 2020-12-30

## 2020-12-31 ENCOUNTER — PATIENT MESSAGE (OUTPATIENT)
Dept: OBGYN | Age: 55
End: 2020-12-31

## 2021-01-15 ENCOUNTER — TELEPHONE (OUTPATIENT)
Dept: SURGERY | Age: 56
End: 2021-01-15

## 2021-01-15 ENCOUNTER — TELEPHONE (OUTPATIENT)
Dept: ULTRASOUND IMAGING | Age: 56
End: 2021-01-15

## 2021-01-15 ENCOUNTER — HOSPITAL ENCOUNTER (OUTPATIENT)
Dept: ULTRASOUND IMAGING | Age: 56
Discharge: HOME | End: 2021-01-17
Payer: COMMERCIAL

## 2021-01-15 ENCOUNTER — HOSPITAL ENCOUNTER (OUTPATIENT)
Dept: LAB | Age: 56
Discharge: HOME | End: 2021-01-15
Payer: COMMERCIAL

## 2021-01-15 ENCOUNTER — APPOINTMENT (OUTPATIENT)
Dept: MAMMOGRAPHY | Age: 56
End: 2021-01-15
Payer: COMMERCIAL

## 2021-01-15 DIAGNOSIS — R92.8 ABNORMAL MRI, BREAST: Primary | ICD-10-CM

## 2021-01-15 DIAGNOSIS — R63.5 WEIGHT GAIN: ICD-10-CM

## 2021-01-15 DIAGNOSIS — Z00.00 PERIODIC HEALTH ASSESSMENT, GENERAL SCREENING, ADULT: ICD-10-CM

## 2021-01-15 DIAGNOSIS — R92.8 ABNORMAL MRI, BREAST: ICD-10-CM

## 2021-01-15 LAB
ABSOLUTE EOS #: 0.25 K/UL (ref 0–0.44)
ABSOLUTE IMMATURE GRANULOCYTE: <0.03 K/UL (ref 0–0.3)
ABSOLUTE LYMPH #: 1.43 K/UL (ref 1.1–3.7)
ABSOLUTE MONO #: 0.39 K/UL (ref 0.1–1.2)
ALBUMIN SERPL-MCNC: 4.5 G/DL (ref 3.5–5.2)
ALBUMIN/GLOBULIN RATIO: 1.6 (ref 1–2.5)
ALP BLD-CCNC: 88 U/L (ref 35–104)
ALT SERPL-CCNC: 21 U/L (ref 5–33)
ANION GAP SERPL CALCULATED.3IONS-SCNC: 10 MMOL/L (ref 9–17)
AST SERPL-CCNC: 18 U/L
BASOPHILS # BLD: 1 % (ref 0–2)
BASOPHILS ABSOLUTE: 0.04 K/UL (ref 0–0.2)
BILIRUB SERPL-MCNC: 0.29 MG/DL (ref 0.3–1.2)
BUN BLDV-MCNC: 14 MG/DL (ref 6–20)
BUN/CREAT BLD: 18 (ref 9–20)
CALCIUM SERPL-MCNC: 10.1 MG/DL (ref 8.6–10.4)
CHLORIDE BLD-SCNC: 103 MMOL/L (ref 98–107)
CHOLESTEROL/HDL RATIO: 3.4
CHOLESTEROL: 168 MG/DL
CO2: 28 MMOL/L (ref 20–31)
CREAT SERPL-MCNC: 0.76 MG/DL (ref 0.5–0.9)
DIFFERENTIAL TYPE: ABNORMAL
EOSINOPHILS RELATIVE PERCENT: 5 % (ref 1–4)
GFR AFRICAN AMERICAN: >60 ML/MIN
GFR NON-AFRICAN AMERICAN: >60 ML/MIN
GFR SERPL CREATININE-BSD FRML MDRD: ABNORMAL ML/MIN/{1.73_M2}
GFR SERPL CREATININE-BSD FRML MDRD: ABNORMAL ML/MIN/{1.73_M2}
GLUCOSE BLD-MCNC: 89 MG/DL (ref 70–99)
HCT VFR BLD CALC: 37.8 % (ref 36.3–47.1)
HDLC SERPL-MCNC: 50 MG/DL
HEMOGLOBIN: 12.4 G/DL (ref 11.9–15.1)
IMMATURE GRANULOCYTES: 0 %
LDL CHOLESTEROL: 75 MG/DL (ref 0–130)
LYMPHOCYTES # BLD: 29 % (ref 24–43)
MCH RBC QN AUTO: 29.4 PG (ref 25.2–33.5)
MCHC RBC AUTO-ENTMCNC: 32.8 G/DL (ref 25.2–33.5)
MCV RBC AUTO: 89.6 FL (ref 82.6–102.9)
MONOCYTES # BLD: 8 % (ref 3–12)
NRBC AUTOMATED: 0 PER 100 WBC
PDW BLD-RTO: 13.1 % (ref 11.8–14.4)
PLATELET # BLD: 244 K/UL (ref 138–453)
PLATELET ESTIMATE: ABNORMAL
PMV BLD AUTO: 9.1 FL (ref 8.1–13.5)
POTASSIUM SERPL-SCNC: 3.9 MMOL/L (ref 3.7–5.3)
RBC # BLD: 4.22 M/UL (ref 3.95–5.11)
RBC # BLD: ABNORMAL 10*6/UL
SEG NEUTROPHILS: 57 % (ref 36–65)
SEGMENTED NEUTROPHILS ABSOLUTE COUNT: 2.87 K/UL (ref 1.5–8.1)
SODIUM BLD-SCNC: 141 MMOL/L (ref 135–144)
TOTAL PROTEIN: 7.3 G/DL (ref 6.4–8.3)
TRIGL SERPL-MCNC: 215 MG/DL
TSH SERPL DL<=0.05 MIU/L-ACNC: 1.98 MIU/L (ref 0.3–5)
VLDLC SERPL CALC-MCNC: ABNORMAL MG/DL (ref 1–30)
WBC # BLD: 5 K/UL (ref 3.5–11.3)
WBC # BLD: ABNORMAL 10*3/UL

## 2021-01-15 PROCEDURE — 85025 COMPLETE CBC W/AUTO DIFF WBC: CPT

## 2021-01-15 PROCEDURE — 80061 LIPID PANEL: CPT

## 2021-01-15 PROCEDURE — 84443 ASSAY THYROID STIM HORMONE: CPT

## 2021-01-15 PROCEDURE — 36415 COLL VENOUS BLD VENIPUNCTURE: CPT

## 2021-01-15 PROCEDURE — 80053 COMPREHEN METABOLIC PANEL: CPT

## 2021-01-15 PROCEDURE — 76642 ULTRASOUND BREAST LIMITED: CPT

## 2021-01-15 NOTE — TELEPHONE ENCOUNTER
Shoshana Hodge from ultrasound called. Patient has an abnormal breast ultrasound. Dr Dhruv Truong is asking if Dr Dago Neff will put orders in for a breast biopsy without seeing the patient. Shoshana Hodeg states patient is established with Dr Dago Neff. (last seen 2019) Please call Shoshana Hodge back at ext [phone].

## 2021-01-20 ENCOUNTER — OFFICE VISIT (OUTPATIENT)
Dept: INTERNAL MEDICINE | Age: 56
End: 2021-01-20
Payer: COMMERCIAL

## 2021-01-20 VITALS
HEIGHT: 64 IN | WEIGHT: 187.2 LBS | RESPIRATION RATE: 18 BRPM | BODY MASS INDEX: 31.96 KG/M2 | DIASTOLIC BLOOD PRESSURE: 88 MMHG | TEMPERATURE: 98.5 F | SYSTOLIC BLOOD PRESSURE: 136 MMHG | HEART RATE: 74 BPM

## 2021-01-20 DIAGNOSIS — N63.0 BREAST MASS: ICD-10-CM

## 2021-01-20 DIAGNOSIS — Z99.89 OSA ON CPAP: ICD-10-CM

## 2021-01-20 DIAGNOSIS — F33.1 MODERATE EPISODE OF RECURRENT MAJOR DEPRESSIVE DISORDER (HCC): ICD-10-CM

## 2021-01-20 DIAGNOSIS — G47.33 OSA ON CPAP: ICD-10-CM

## 2021-01-20 DIAGNOSIS — K21.9 GASTROESOPHAGEAL REFLUX DISEASE WITHOUT ESOPHAGITIS: Primary | ICD-10-CM

## 2021-01-20 PROCEDURE — 3017F COLORECTAL CA SCREEN DOC REV: CPT | Performed by: INTERNAL MEDICINE

## 2021-01-20 PROCEDURE — G8417 CALC BMI ABV UP PARAM F/U: HCPCS | Performed by: INTERNAL MEDICINE

## 2021-01-20 PROCEDURE — 99214 OFFICE O/P EST MOD 30 MIN: CPT | Performed by: INTERNAL MEDICINE

## 2021-01-20 PROCEDURE — G8427 DOCREV CUR MEDS BY ELIG CLIN: HCPCS | Performed by: INTERNAL MEDICINE

## 2021-01-20 PROCEDURE — G8484 FLU IMMUNIZE NO ADMIN: HCPCS | Performed by: INTERNAL MEDICINE

## 2021-01-20 PROCEDURE — 1036F TOBACCO NON-USER: CPT | Performed by: INTERNAL MEDICINE

## 2021-01-29 ENCOUNTER — HOSPITAL ENCOUNTER (OUTPATIENT)
Dept: MAMMOGRAPHY | Age: 56
Discharge: HOME | End: 2021-01-31
Payer: COMMERCIAL

## 2021-01-29 ENCOUNTER — HOSPITAL ENCOUNTER (OUTPATIENT)
Dept: ULTRASOUND IMAGING | Age: 56
Discharge: HOME | End: 2021-01-31
Payer: COMMERCIAL

## 2021-01-29 ENCOUNTER — HOSPITAL ENCOUNTER (OUTPATIENT)
Age: 56
Setting detail: SPECIMEN
Discharge: HOME | End: 2021-01-29
Payer: COMMERCIAL

## 2021-01-29 ENCOUNTER — HOSPITAL ENCOUNTER (OUTPATIENT)
Dept: MRI IMAGING | Age: 56
Discharge: HOME | End: 2021-01-31
Payer: COMMERCIAL

## 2021-01-29 DIAGNOSIS — R92.8 ABNORMAL MRI, BREAST: ICD-10-CM

## 2021-01-29 DIAGNOSIS — R92.8 ABNORMAL MRI, BREAST: Primary | ICD-10-CM

## 2021-01-29 PROCEDURE — 77065 DX MAMMO INCL CAD UNI: CPT

## 2021-01-29 PROCEDURE — 77047 MRI BREAST C- BILATERAL: CPT

## 2021-01-29 PROCEDURE — 19083 BX BREAST 1ST LESION US IMAG: CPT

## 2021-01-29 PROCEDURE — 88305 TISSUE EXAM BY PATHOLOGIST: CPT

## 2021-01-31 NOTE — PROGRESS NOTES
[place] [address]  Dept: [phone]  Dept Fax: [phone]  Loc: [phone]     Visit Date:  1/20/2021  Patient:  Manny Aguilera  YOB: 1965  Provider: Vincent Cuevas MD    Assessment & Plan      Breast mass: Underwent ultrasound-guided biopsy. Note from procedure reviewed. Reassess next visit. Follows with surgery. YOJANA on CPAP: Currently asymptomatic. Continues to benefit. Will continue CPAP. Depression: Currently stable. Continue Zoloft. Reassess next visit. GERD: Asymptomatic at this time. Continue same management       Diagnosis Orders   1. Gastroesophageal reflux disease without esophagitis     2. YOJANA on CPAP     3. Moderate episode of recurrent major depressive disorder ([facility] [address]     4. Breast mass         Discussed use, benefit, and side effects of prescribed medications. All questions answered. Patient voiced understanding. Reviewed health maintenance. HPI:     She was seen in the internal medicine office today for   Chief Complaint   Patient presents with    Lower Back Pain     6mo    Gastroesophageal Reflux    Sleep Apnea        Presents to follow-up. Was recently found to have a mass on mammogram and recently underwent a ultrasound guided breast biopsy. Results are still pending. Has a history of YOJANA which has been stable. Has a history of depression which has been stable.     Has a history of GERD which has been asymptomatic    Subjective:      REVIEW OF SYSTEMS    Constitutional: Denies fever, chills  Eyes: Denies recent vision changes  Cardiovascular: Denies chest pain, LL edema  Respiratory: Denies cough, wheezes  Gastrointestinal: Denies abdominal pain, nausea, vomiting  Skin: Denies rash  Endocrine: Denies polyuria  Hematologic: Denies bruising  Genitourinary: Denies dysuria, hematuria  Neurological: Denies headache, numbness Medications    Current Outpatient Medications:     sertraline (ZOLOFT) 100 MG tablet, Take 1 tablet by mouth daily, Disp: 90 tablet, Rfl: 3    Multiple Vitamins-Minerals (MULTIVITAL PO), Take 1 tablet by mouth daily as needed , Disp: , Rfl:     Cholecalciferol (VITAMIN D) 2000 UNITS CAPS capsule, Take 2,000 Units by mouth daily , Disp: , Rfl:     cimetidine (TAGAMET) 200 MG tablet, Take 1 tablet by mouth 2 times daily as needed (heartburn), Disp: 60 tablet, Rfl: 3    The patient is allergic to doxycycline; aspirin; and penicillins. Past Medical History  Valdez Ca  has a past medical history of Allergic rhinitis, Anxiety, Chronic tension headaches, Depression, DUB (dysfunctional uterine bleeding), Fatigue, GERD (gastroesophageal reflux disease), Hepatitis A, Internal hemorrhoids, Lateral epicondylitis, Low back pain, YOJANA on CPAP, Thyroid nodule, Trauma, and Uterine fibroid. Past Surgical History  The patient  has a past surgical history that includes  section; hysteroscopy (13); Dilation & curettage (13 and 2008); [place] tooth extraction; hysteroscopy (13); Dilation and curettage of uterus (13); Endometrial ablation (13); Colonoscopy (2014); EXCISION LESION HAND / FINGER (Right, 2019); and US BREAST BIOPSY W LOC DEVICE 1ST LESION RIGHT (Right, 2021). Family History  This patient's family history includes Asthma in her father; Breast Cancer in her maternal aunt and other family members; Breast Cancer (age of onset: 48) in her maternal grandmother; Breast Cancer (age of onset: 61) in her mother; Cancer in her mother; Endometrial Cancer (age of onset: 61) in her sister; Heart Disease in her paternal grandmother; Hypertension in her father and mother; Wallene Snellen in her maternal grandfather; Kareem Martini / [place] in her sister and sister; Other in her mother and paternal grandfather; Pacemaker in her father; Stroke in her father.     Social History Lesli Le  reports that she has never smoked. She has never used smokeless tobacco. She reports current alcohol use. She reports that she does not use drugs. Health Maintenance:    Health Maintenance   Topic Date Due    Flu vaccine (1) 09/01/2020    Breast cancer screen  01/29/2022    Cervical cancer screen  12/09/2023    Colon cancer screen colonoscopy  04/25/2024    Lipid screen  01/15/2026    DTaP/Tdap/Td vaccine (2 - Td) 01/11/2027    Shingles Vaccine  Completed    Hepatitis C screen  Completed    HIV screen  Completed    Hepatitis A vaccine  Aged Out    Hepatitis B vaccine  Aged Out    Hib vaccine  Aged Out    Meningococcal (ACWY) vaccine  Aged Out    Pneumococcal 0-64 years Vaccine  Aged Out           Objective:     PHYSICAL EXAM  /88 (Site: Left Upper Arm, Position: Sitting, Cuff Size: Medium Adult)   Pulse 74   Temp 98.5 °F (36.9 °C)   Resp 18   Ht 5' 4\" (1.626 m)   Wt 187 lb 3.2 oz (84.9 kg)   BMI 32.13 kg/m²   Constitutional: No acute distress. Sits in chair comfortably  Eyes: Sclerae nonicteric. No lid lag or proptosis  HENT: External ears normal. No external lesions on the nose  Neck: No gross masses. Trachea visibly midline  Respiratory: Good air entry bilaterally. No wheezing or crackles  Cardiovascular: Normal S1-S2. No murmurs. No lower extremity edema  Gastrointestinal: No visible masses. No visible hernias  Skin: No abnormal rashes. No abnormal masses  Neurologic: Cranial nerves grossly intact  Psychiatric: Normal affect.  Alert and oriented        Labs Reviewed 1/20/2021:    Lab Results   Component Value Date    WBC 5.0 01/15/2021    HGB 12.4 01/15/2021    HCT 37.8 01/15/2021     01/15/2021    CHOL 168 01/15/2021    TRIG 215 (H) 01/15/2021    HDL 50 01/15/2021    ALT 21 01/15/2021    AST 18 01/15/2021     01/15/2021    K 3.9 01/15/2021     01/15/2021    CREATININE 0.76 01/15/2021    BUN 14 01/15/2021    CO2 28 01/15/2021    TSH 1.98 01/15/2021 Electronically signed Rosalina Lambert MD on 1/20/2021 at 5:34 PM      This note has been created using the Epic electronic health record, and dictated in part by [employer] dictation system. Despite the documenting physician's best efforts, there may be errors in spelling, grammar or syntax.

## 2021-02-01 LAB — SURGICAL PATHOLOGY REPORT: NORMAL

## 2021-02-03 DIAGNOSIS — R92.8 ABNORMAL MRI, BREAST: Primary | ICD-10-CM

## 2021-02-12 ENCOUNTER — HOSPITAL ENCOUNTER (OUTPATIENT)
Dept: MRI IMAGING | Age: 56
Discharge: HOME | End: 2021-02-14
Payer: COMMERCIAL

## 2021-02-12 DIAGNOSIS — R92.8 ABNORMAL MRI, BREAST: ICD-10-CM

## 2021-02-12 PROCEDURE — A9579 GAD-BASE MR CONTRAST NOS,1ML: HCPCS | Performed by: SURGERY

## 2021-02-12 PROCEDURE — 6360000004 HC RX CONTRAST MEDICATION: Performed by: SURGERY

## 2021-02-12 PROCEDURE — 2709999900 MRI BREAST BILATERAL W WO CONTRAST

## 2021-02-12 RX ADMIN — GADOTERIDOL 20 ML: 279.3 INJECTION, SOLUTION INTRAVENOUS at 10:23

## 2021-02-15 ENCOUNTER — PATIENT MESSAGE (OUTPATIENT)
Dept: INTERNAL MEDICINE | Age: 56
End: 2021-02-15

## 2021-02-17 DIAGNOSIS — R92.8 ABNORMAL MRI, BREAST: Primary | ICD-10-CM

## 2021-02-17 NOTE — TELEPHONE ENCOUNTER
Please let her know that 144/100 is a bit high, but otherwise her blood pressure has always been less than 140/90 in clinic, which is normal.  We can either continue to monitor blood pressure in clinic, which is what we are going to to do anyway. If she is able to check it at home, and check it every day for a week, then that might give us more information to go off.   If she plans to check it, she can always call us with the results

## 2021-02-17 NOTE — TELEPHONE ENCOUNTER
From: Deloris Waldrop  To: Deyvi Del Rosario MD  Sent: 2/15/2021 6:15 PM EST  Subject: Non-Urgent Medical Question    Stiven,    I saw Dr. Stephy Moreira on January 20, 2021. Nurse said my blood pressure was high and she retook it twice. Dr. Stephy Moreira did not say anything about the blood pressure. I stopped in at the office on February 12, 2021, because I had checked it the day before at a different doctor office, and it was 144/100. On February 12, 2021, it was also elevated. Nurse said she would let Dr. Stephy Moreira know about the bp on February 15, 2021. I have not heard from him. Please check with him and let me know what I should do about this or if there is reason for concern.     Thanks,    Amy Irwin

## 2021-02-17 NOTE — TELEPHONE ENCOUNTER
Sara Langford called requesting a refill of the below medication which has been pended for you:     Requested Prescriptions     Pending Prescriptions Disp Refills    cimetidine (TAGAMET) 200 MG tablet 60 tablet 3     Sig: Take 1 tablet by mouth 2 times daily as needed (heartburn)       Last Appointment Date: 1/20/2021  Next Appointment Date: 7/12/2021    Allergies   Allergen Reactions    Doxycycline      Urticaria      Aspirin Rash    Penicillins Swelling and Rash

## 2021-02-17 NOTE — TELEPHONE ENCOUNTER
Pt was notified and will document her b/p's for one week and bring or call them to our office for Dr Deion Daugherty to evaluate.

## 2021-03-01 ENCOUNTER — HOSPITAL ENCOUNTER (OUTPATIENT)
Dept: MAMMOGRAPHY | Age: 56
Discharge: HOME | End: 2021-03-03
Payer: COMMERCIAL

## 2021-03-01 ENCOUNTER — HOSPITAL ENCOUNTER (OUTPATIENT)
Dept: MRI IMAGING | Age: 56
Discharge: HOME | End: 2021-03-03
Payer: COMMERCIAL

## 2021-03-01 DIAGNOSIS — N63.10 BREAST MASS, RIGHT: ICD-10-CM

## 2021-03-01 DIAGNOSIS — R92.8 ABNORMAL MRI, BREAST: ICD-10-CM

## 2021-03-01 PROCEDURE — A9579 GAD-BASE MR CONTRAST NOS,1ML: HCPCS | Performed by: SURGERY

## 2021-03-01 PROCEDURE — 6360000004 HC RX CONTRAST MEDICATION: Performed by: SURGERY

## 2021-03-01 PROCEDURE — 2500000003 HC RX 250 WO HCPCS

## 2021-03-01 PROCEDURE — 88305 TISSUE EXAM BY PATHOLOGIST: CPT

## 2021-03-01 PROCEDURE — 2580000003 HC RX 258: Performed by: SURGERY

## 2021-03-01 PROCEDURE — 19085 BX BREAST 1ST LESION MR IMAG: CPT

## 2021-03-01 PROCEDURE — A4648 IMPLANTABLE TISSUE MARKER: HCPCS

## 2021-03-01 RX ORDER — 0.9 % SODIUM CHLORIDE 0.9 %
20 INTRAVENOUS SOLUTION INTRAVENOUS
Status: DISCONTINUED | OUTPATIENT
Start: 2021-03-01 | End: 2021-03-04 | Stop reason: HOSPADM

## 2021-03-01 RX ORDER — SODIUM CHLORIDE 0.9 % (FLUSH) 0.9 %
10 SYRINGE (ML) INJECTION
Status: COMPLETED | OUTPATIENT
Start: 2021-03-01 | End: 2021-03-01

## 2021-03-01 RX ADMIN — Medication 10 ML: at 14:17

## 2021-03-01 RX ADMIN — GADOTERIDOL 19 ML: 279.3 INJECTION, SOLUTION INTRAVENOUS at 14:16

## 2021-03-03 LAB — SURGICAL PATHOLOGY REPORT: NORMAL

## 2021-03-09 ENCOUNTER — OFFICE VISIT (OUTPATIENT)
Dept: SURGERY | Age: 56
End: 2021-03-09
Payer: COMMERCIAL

## 2021-03-09 VITALS
TEMPERATURE: 98 F | OXYGEN SATURATION: 98 % | DIASTOLIC BLOOD PRESSURE: 70 MMHG | HEART RATE: 85 BPM | HEIGHT: 64 IN | BODY MASS INDEX: 30.56 KG/M2 | WEIGHT: 179 LBS | SYSTOLIC BLOOD PRESSURE: 108 MMHG

## 2021-03-09 DIAGNOSIS — R92.8 ABNORMAL MAGNETIC RESONANCE IMAGING OF RIGHT BREAST: Primary | ICD-10-CM

## 2021-03-09 PROCEDURE — G8427 DOCREV CUR MEDS BY ELIG CLIN: HCPCS | Performed by: SURGERY

## 2021-03-09 PROCEDURE — 99212 OFFICE O/P EST SF 10 MIN: CPT | Performed by: SURGERY

## 2021-03-09 PROCEDURE — 1036F TOBACCO NON-USER: CPT | Performed by: SURGERY

## 2021-03-09 PROCEDURE — 3017F COLORECTAL CA SCREEN DOC REV: CPT | Performed by: SURGERY

## 2021-03-09 PROCEDURE — G8417 CALC BMI ABV UP PARAM F/U: HCPCS | Performed by: SURGERY

## 2021-03-09 PROCEDURE — G8484 FLU IMMUNIZE NO ADMIN: HCPCS | Performed by: SURGERY

## 2021-03-09 NOTE — PROGRESS NOTES
3/3/2021 RIGHT BREAST, UPPER OUTER QUADRANT, MRI GUIDED BIOPSY:  FIBROCYSTIC   CHANGES.  NEGATIVE FOR ATYPIA AND MALIGNANCY.     1/29/2021 Organized fat necrosis and associated fibrosis. Proliferative fibrocystic change with fibrosis, cysts, calcifications,   and usual ductal hyperplasia. Negative for atypia and malignancy. Biopsies reviewed. Questions answered. Repeat imaging as recommended.

## 2021-04-06 ENCOUNTER — PATIENT MESSAGE (OUTPATIENT)
Dept: INTERNAL MEDICINE | Age: 56
End: 2021-04-06

## 2021-04-06 DIAGNOSIS — F33.1 MODERATE EPISODE OF RECURRENT MAJOR DEPRESSIVE DISORDER (HCC): ICD-10-CM

## 2021-04-06 RX ORDER — SERTRALINE HYDROCHLORIDE 100 MG/1
100 TABLET, FILM COATED ORAL DAILY
Qty: 90 TABLET | Refills: 3 | Status: SHIPPED | OUTPATIENT
Start: 2021-04-06 | End: 2022-03-21 | Stop reason: SDUPTHER

## 2021-04-06 NOTE — TELEPHONE ENCOUNTER
From: Juan Cox  To: Junior Weaver MD  Sent: 4/6/2021 2:04 PM EDT  Subject: Prescription Question    Hello,    I need to fill a sertraline 100 mg prescription, but I can't request a refill on this prescription through My Chart. I would like the prescription called in to [facility] in [facility].     Thanks,    Pippa Moser

## 2021-06-22 ENCOUNTER — HOSPITAL ENCOUNTER (OUTPATIENT)
Dept: MAMMOGRAPHY | Age: 56
Discharge: HOME | End: 2021-06-24
Payer: COMMERCIAL

## 2021-06-22 ENCOUNTER — HOSPITAL ENCOUNTER (OUTPATIENT)
Dept: MRI IMAGING | Age: 56
Discharge: HOME | End: 2021-06-24
Payer: COMMERCIAL

## 2021-06-22 VITALS — BODY MASS INDEX: 29.23 KG/M2 | WEIGHT: 165 LBS | HEIGHT: 63 IN

## 2021-06-22 DIAGNOSIS — R92.8 ABNORMAL MAGNETIC RESONANCE IMAGING OF RIGHT BREAST: ICD-10-CM

## 2021-06-22 DIAGNOSIS — Z12.31 OTHER SCREENING MAMMOGRAM: ICD-10-CM

## 2021-06-22 PROCEDURE — A9579 GAD-BASE MR CONTRAST NOS,1ML: HCPCS | Performed by: SURGERY

## 2021-06-22 PROCEDURE — 2709999900 MRI BREAST BILATERAL W WO CONTRAST

## 2021-06-22 PROCEDURE — 6360000004 HC RX CONTRAST MEDICATION: Performed by: SURGERY

## 2021-06-22 PROCEDURE — 77063 BREAST TOMOSYNTHESIS BI: CPT

## 2021-06-22 RX ADMIN — GADOTERIDOL 20 ML: 279.3 INJECTION, SOLUTION INTRAVENOUS at 11:26

## 2021-06-24 DIAGNOSIS — R92.8 ABNORMAL MAGNETIC RESONANCE IMAGING OF RIGHT BREAST: Primary | ICD-10-CM

## 2021-06-24 DIAGNOSIS — Z80.3 FAMILY HISTORY OF BREAST CANCER: ICD-10-CM

## 2021-06-24 DIAGNOSIS — R92.8 ABNORMAL MRI, BREAST: ICD-10-CM

## 2021-07-12 ENCOUNTER — OFFICE VISIT (OUTPATIENT)
Dept: INTERNAL MEDICINE | Age: 56
End: 2021-07-12
Payer: COMMERCIAL

## 2021-07-12 VITALS
HEIGHT: 63 IN | DIASTOLIC BLOOD PRESSURE: 86 MMHG | WEIGHT: 162.8 LBS | SYSTOLIC BLOOD PRESSURE: 130 MMHG | BODY MASS INDEX: 28.84 KG/M2 | RESPIRATION RATE: 16 BRPM | TEMPERATURE: 98 F | HEART RATE: 68 BPM

## 2021-07-12 DIAGNOSIS — N63.0 BREAST MASS: ICD-10-CM

## 2021-07-12 DIAGNOSIS — F33.1 MODERATE EPISODE OF RECURRENT MAJOR DEPRESSIVE DISORDER (HCC): ICD-10-CM

## 2021-07-12 DIAGNOSIS — K21.9 GASTROESOPHAGEAL REFLUX DISEASE WITHOUT ESOPHAGITIS: ICD-10-CM

## 2021-07-12 DIAGNOSIS — Z99.89 OSA ON CPAP: ICD-10-CM

## 2021-07-12 DIAGNOSIS — G47.33 OSA ON CPAP: ICD-10-CM

## 2021-07-12 DIAGNOSIS — Z00.00 PERIODIC HEALTH ASSESSMENT, GENERAL SCREENING, ADULT: Primary | ICD-10-CM

## 2021-07-12 PROCEDURE — 1036F TOBACCO NON-USER: CPT | Performed by: INTERNAL MEDICINE

## 2021-07-12 PROCEDURE — G8417 CALC BMI ABV UP PARAM F/U: HCPCS | Performed by: INTERNAL MEDICINE

## 2021-07-12 PROCEDURE — G8427 DOCREV CUR MEDS BY ELIG CLIN: HCPCS | Performed by: INTERNAL MEDICINE

## 2021-07-12 PROCEDURE — 99214 OFFICE O/P EST MOD 30 MIN: CPT | Performed by: INTERNAL MEDICINE

## 2021-07-12 PROCEDURE — 3017F COLORECTAL CA SCREEN DOC REV: CPT | Performed by: INTERNAL MEDICINE

## 2021-07-16 NOTE — PROGRESS NOTES
[address] [facility] INTERNAL MEDICINE    Visit Date:  7/12/2021  Patient:  Colt Bartholomew  YOB: 1965    Assessment & Plan     Breast mass: Underwent biopsy in March 2021 that showed fibrocystic changes, negative for atypia malignancy. Follows with general surgery. We will continue to monitor. Depression: Continue Zoloft. We will continue to monitor. GERD: Continue Tagamet. We will continue to monitor. YOJANA on CPAP: Continues to benefit from CPAP. We will continue. Diagnosis Orders   1. Periodic health assessment, general screening, adult  CBC Auto Differential    Comprehensive Metabolic Panel    Lipid Panel   2. Gastroesophageal reflux disease without esophagitis     3. Moderate episode of recurrent major depressive disorder ([facility] [address]     4. Breast mass         Subjective    Chief Complaint   Patient presents with    Gastroesophageal Reflux     6mo    Sleep Apnea    Depression     Presents to follow-up. Underwent a biopsy for breast mass found on imaging. In March that showed fibrocystic changes. She continues to follow with general surgery. She is asymptomatic at this time. Denies breast pain, discharge, blood. Has a history of depression, GERD, YOJANA that are unchanged    Objective  /86   Pulse 68   Temp 98 °F (36.7 °C) (Tympanic)   Resp 16   Ht 5' 3\" (1.6 m)   Wt 162 lb 12.8 oz (73.8 kg)   BMI 28.84 kg/m²   Constitutional: No acute distress. Sits in chair comfortably  Eyes: Sclerae nonicteric. No lid lag or proptosis  HENT: External ears normal. No external lesions on the nose  Neck: No gross masses. Trachea visibly midline  Respiratory: Good air entry bilaterally. No wheezing or crackles  Cardiovascular: Normal S1-S2. No murmurs. No lower extremity edema  Gastrointestinal: No visible masses. No visible hernias  Skin: No abnormal rashes. No abnormal masses  Neurologic: Cranial nerves grossly intact  Psychiatric: Normal affect.  Alert and oriented    Medications  Current Outpatient Medications:     sertraline (ZOLOFT) 100 MG tablet, Take 1 tablet by mouth daily, Disp: 90 tablet, Rfl: 3    Multiple Vitamins-Minerals (MULTIVITAL PO), Take 1 tablet by mouth daily as needed , Disp: , Rfl:     Cholecalciferol (VITAMIN D) 2000 UNITS CAPS capsule, Take 2,000 Units by mouth daily , Disp: , Rfl:     cimetidine (TAGAMET) 200 MG tablet, Take 1 tablet by mouth 2 times daily as needed (heartburn), Disp: 60 tablet, Rfl: 3  The patient is allergic to doxycycline, aspirin, and penicillins. Past Medical History  Abdoulaye Lyman  has a past medical history of Allergic rhinitis, Anxiety, Chronic tension headaches, Depression, DUB (dysfunctional uterine bleeding), Fatigue, GERD (gastroesophageal reflux disease), Hepatitis A, Internal hemorrhoids, Lateral epicondylitis, Low back pain, YOJANA on CPAP, Thyroid nodule, Trauma, and Uterine fibroid. Past Surgical History  The patient  has a past surgical history that includes  section; hysteroscopy (11); Dilation & curettage (11 and 2008); [place] tooth extraction; hysteroscopy (13); Dilation and curettage of uterus (13); Endometrial ablation (13); Colonoscopy (2014); EXCISION LESION HAND / FINGER (Right, 2019); US BREAST BIOPSY W LOC DEVICE 1ST LESION RIGHT (Right, 2021); MRI BIOPSY BREAST W LOC DEVICE RIGHT 1ST LESION (Right, 3/1/2021); Breast biopsy (Right); and Breast biopsy (Left). Family History  This patient's family history includes Asthma in her father; Breast Cancer in her maternal aunt and other family members; Breast Cancer (age of onset: 48) in her maternal grandmother; Breast Cancer (age of onset: 61) in her mother; Cancer in her mother; Endometrial Cancer (age of onset: 61) in her sister;  Heart Disease in her paternal grandmother; Hypertension in her father and mother; Myrtie Jayess in her maternal grandfather; Lucilla Mercy / Djibaleei in her sister and sister; No Known Problems in her maternal aunt and paternal aunt; Other in her mother and paternal grandfather; Pacemaker in her father; Stroke in her father. Social History  Michele Guerra  reports that she has never smoked. She has never used smokeless tobacco. She reports current alcohol use. She reports that she does not use drugs. Discussed use, benefit, and side effects of prescribed medications. All questions answered. Patient voiced understanding. Reviewed health maintenance. Electronically signed Katja Harrington MD on 7/12/2021 at 1:48 PM    This note has been created using the Epic electronic health record, and dictated in part by [employer] dictation system. Despite the documenting physician's best efforts, there may be errors in spelling, grammar or syntax.

## 2021-08-09 NOTE — TELEPHONE ENCOUNTER
Pharmacy requesting a refill of the below medication which has been pended for you:     Requested Prescriptions     Pending Prescriptions Disp Refills    cimetidine (TAGAMET) 200 MG tablet [Pharmacy Med Name: Cimetidine Oral Tablet 200 MG] 60 tablet 0     Sig: TAKE 1 TABLET BY MOUTH TWO TIMES A DAY AS NEEDED FOR HEATBURN       Last Appointment Date: 7/12/2021  Next Appointment Date: 1/12/2022    Allergies   Allergen Reactions    Doxycycline      Urticaria      Aspirin Rash    Penicillins Swelling and Rash

## 2021-08-11 ENCOUNTER — OFFICE VISIT (OUTPATIENT)
Dept: PULMONOLOGY | Age: 56
End: 2021-08-11
Payer: COMMERCIAL

## 2021-08-11 VITALS
BODY MASS INDEX: 28.53 KG/M2 | OXYGEN SATURATION: 95 % | WEIGHT: 161 LBS | HEART RATE: 94 BPM | SYSTOLIC BLOOD PRESSURE: 112 MMHG | TEMPERATURE: 97.6 F | DIASTOLIC BLOOD PRESSURE: 76 MMHG | HEIGHT: 63 IN

## 2021-08-11 DIAGNOSIS — Z99.89 OSA ON CPAP: Primary | ICD-10-CM

## 2021-08-11 DIAGNOSIS — G47.33 OSA ON CPAP: Primary | ICD-10-CM

## 2021-08-11 PROCEDURE — 3017F COLORECTAL CA SCREEN DOC REV: CPT | Performed by: INTERNAL MEDICINE

## 2021-08-11 PROCEDURE — 99213 OFFICE O/P EST LOW 20 MIN: CPT | Performed by: INTERNAL MEDICINE

## 2021-08-11 PROCEDURE — G8427 DOCREV CUR MEDS BY ELIG CLIN: HCPCS | Performed by: INTERNAL MEDICINE

## 2021-08-11 PROCEDURE — 1036F TOBACCO NON-USER: CPT | Performed by: INTERNAL MEDICINE

## 2021-08-11 PROCEDURE — G8417 CALC BMI ABV UP PARAM F/U: HCPCS | Performed by: INTERNAL MEDICINE

## 2021-08-11 RX ORDER — CETIRIZINE HYDROCHLORIDE 10 MG/1
10 TABLET ORAL DAILY
COMMUNITY

## 2021-08-11 NOTE — PROGRESS NOTES
REASON FOR THE CONSULTATION:  montez   HISTORY OF PRESENT ILLNESS:    Eduard Arora is a 60y.o. year old female here for       Eduard Arora is a 60 y.o. old female who comes in for follow up regarding her obstructive sleep apnea. She is currently doing well using her positive airway pressure device. She is sleeping better at night with her machine and says she has less daytime sleepiness. There have been no driving issues and concentration is better when awake. The machine pressure settings are comfortable, the mask is reasonably comfortable and she is using the humidity. There have been no ER visits, hospital visits, any new issues or medication changes since the last visit here in sleep clinic. Last visit   Patient denies any aerophagia. Today, he has tolerated the CPAP at 8 cm of water pressure. Compliance data from 9/23/2017.-10/22/2017 shows 93.3% compliance and AHI is 4.9. She does feel fatigued and tired during the day, particularly while driving. She takes Zanaflex at night, but tells me that she only takes this intermittently. She is also on Zoloft 100 mg for depression. She gets up once at night to go to the bathroom. Her sleep time is 10 or 11 PM and wake time is 6 AM.  Sleep Medicine 10/24/2017   Sitting and reading 1   Watching TV 1   Sitting, inactive in a public place (e.g. a theatre or a meeting) 0   As a passenger in a car for an hour without a break 2   Lying down to rest in the afternoon when circumstances permit 1   Sitting and talking to someone 0   Sitting quietly after a lunch without alcohol 0   In a car, while stopped for a few minutes in traffic 3   Total score 8                Previous visit   evaluation of evaluation of MONTEZ. Patient's initial sleep study was done 11/1/2016 and was found to have an AHI of 17.8. She was titrated to a CPAP of 12 cm of water pressure with a nasal pillow. Even during the titration night.   Patient noticed that she was bloated, had lot of distention of her abdomen and passing gas. At that time she was having a full face mask. Since December 7, 2016 when she had her titration night. Patient has been using the CPAP. Her provider is [employer]. Compliance is 76.7% age. Average usage is 4 hours and 49 minutes. This is set at a ramp the pressure setting is 5 cm of water at that time. Start pressure  She still complains of excessive bloating aerophagia and passing gas. Has tried different strategies without success        She had the sleep study because she was tired during the day and was falling asleep at the week since she has used the CPAP. She is not as tired. She feels well rested and is not dozing off at the [place], but because of the constant burping, bloating and gaseous feeling. After using CPAP.   She is concerned and would like to see if we have any intervention      Immunization   Immunization History   Administered Date(s) Administered    COVID-19, Moderna, PF, 100mcg/0.5mL 03/13/2021    Rabies 09/01/2018, 09/07/2018, 09/11/2018, 09/18/2018    Tdap (Boostrix, Adacel) 01/11/2017    Zoster Recombinant (Shingrix) 03/08/2019, 05/10/2019        Pneumococcal Vaccine     [x] not indicated until age of 72 [] Indicated   [] Refused  [] Contraindicated       Influenza Vaccine   [] Up to date    [] Indicated   [x] Refused  [] Contraindicated     PAST MEDICAL HISTORY:       Diagnosis Date    Allergic rhinitis     Anxiety     Originally 1990's, with panic    Chronic tension headaches     occasional    Depression     Zoloft started 1990's    DUB (dysfunctional uterine bleeding)     ablation    Fatigue     GERD (gastroesophageal reflux disease)     No prior scope as of PE 5/9/16- mild sx over yrs managed without meds    Hepatitis A 1969    age 8    Internal hemorrhoids     on scope 2014    Lateral epicondylitis 3/11/2014    resolved    Low back pain     YOJANA on CPAP     sleep study 12/16 CPAP 12    Thyroid nodule     u/s  stable sub cm     Trauma     Uterine fibroid          Family History:       Problem Relation Age of Onset   [facility] Asthma Father     Hypertension Father     Pacemaker Father    [facility] Stroke Father     Hypertension Mother    [facility] Breast Cancer Mother 61        Had genetic testing,  BRCA negative    Other Mother         knee replacements x2    Cancer Mother         non-Hodgkin's lymphoma    Miscarriages / Stillbirths Sister     Endometrial Cancer Sister 61        Stage IV  (2019)    Miscarriages / Stillbirths Sister     Breast Cancer Maternal Grandmother 48    Lung Cancer Maternal Grandfather     Heart Disease Paternal Grandmother     Other Paternal Grandfather         aneurysm    Breast Cancer Other         Mother's maternal aunt    Breast Cancer Maternal Aunt     Breast Cancer Other         Mother's maternal aunt    No Known Problems Maternal Aunt     No Known Problems Paternal Aunt        SURGICAL HISTORY:   Past Surgical History:   Procedure Laterality Date    BREAST BIOPSY Right     right stereotactic biopsy    BREAST BIOPSY Left     stereotactic biopsy     SECTION      COLONOSCOPY  2014    Zoë Leahy    DILATION AND CURETTAGE  13 and 2008    Dr Shay Hood    DILATION AND CURETTAGE OF UTERUS  13    ENDOMETRIAL ABLATION  13    EXCISION LESION HAND / FINGER Right 2019    Excision Right Wrist ganglion cyst performed by Soco Martinez MD at [address] [place] [address] HYSTEROSCOPY  13    Dr Shay Hood    HYSTEROSCOPY  13    MRI BREAST BX USING DEVICE RIGHT Right 3/1/2021    MRI BREAST BX USING DEVICE RIGHT 3/1/2021 [facility]    US BREAST NEEDLE BIOPSY RIGHT Right 2021    US BREAST NEEDLE BIOPSY RIGHT 2021 [id number] [facility] ULTRASOUND    WISDOM TOOTH EXTRACTION             SOCIAL AND OCCUPATIONAL HEALTH:      There  no history of TB or TB exposure. There  no asbestos or silica dust exposure.     The patient reports  no coal, foundry, quarry or [employer] exposure. Travel history reveals no. There  no history of recreational or IV drug use. There  no hot tube exposure. Pets  no    Occupational history administered a persistent    TOBACCO:   reports that she has never smoked. She has never used smokeless tobacco.  ETOH:   reports current alcohol use. ALLERGIES:      Allergies   Allergen Reactions    Doxycycline      Urticaria      Aspirin Rash    Penicillins Swelling and Rash         Home Meds:   Prior to Admission medications    Medication Sig Start Date End Date Taking? Authorizing Provider   cetirizine (ZYRTEC) 10 MG tablet Take 10 mg by mouth daily   Yes Historical Provider, MD   cimetidine (TAGAMET) 200 MG tablet TAKE 1 TABLET BY MOUTH TWO TIMES A DAY AS NEEDED FOR HEATBURN  8/9/21  Yes Anita Valera MD   sertraline (ZOLOFT) 100 MG tablet Take 1 tablet by mouth daily 4/6/21  Yes Anita Valera MD   Multiple Vitamins-Minerals (MULTIVITAL PO) Take 1 tablet by mouth daily as needed    Yes Historical Provider, MD   Cholecalciferol (VITAMIN D) 2000 UNITS CAPS capsule Take 2,000 Units by mouth daily    Yes Historical Provider, MD          Review of Systems -  General ROS: negative for - chills, fatigue, fever or weight loss  ENT ROS: negative for - headaches, oral lesions or sore throat  Cardiovascular ROS: no chest pain , orthopnea or pnd   Gastrointestinal ROS: no abdominal pain, change in bowel habits, or black or bloody stools  Skin - no rash   Neuro - no blurry vision , no loc .  No focal weakness   msk - no jt tenderness or swelling    Vascular - no claudication , rest completed and negative   Lymphatic - complete and negative   Hematology - oncology - complete and negative   Allergy immunology - complete and negative    no burning or hematuria    Vitals:  /76 (Site: Right Upper Arm, Position: Sitting, Cuff Size: Medium Adult)   Pulse 94   Temp 97.6 °F (36.4 °C)   Ht 5' 3\" (1.6 m)   Wt 161 lb (73 kg)   SpO2 95%   BMI 28.52 kg/m²     PHYSICAL EXAM:  Head and neck atraumatic, normocephalic    Lymph nodes-no cervical, supraclavicular lymphadenopathy    Neck-no JVP elevation    Lungs - Ventilating all lobes without any rales, rhonchi, wheezes or dullness. No bronchial breath sounds. Chest expansion equal bilaterally    CVS- S1, S2 regular. No S3 no S4, no murmurs    Abdomen-nontender, nondistended. Bowel sounds are present. No organomegaly    Lower extremity-no edema    Upper extremity-no edema    Neurological-grossly normal cranial nerves. No overt motor deficit               IMPRESSION:     Diagnosis Orders   1. YOJANA on CPAP  DME Order for CPAP as OP        :                PLAN:        CPAP at least 4 hrs qhs  Wt loss is recommended and discussed  Follow good sleep hygeine instructions  Use humidifier if needed  Questions answered pertaining to diagnosis and management explained importance of compliance with therapy   Compliance data reviewed and pt is compliant per insurance requirements     Requested Prescriptions      No prescriptions requested or ordered in this encounter       There are no discontinued medications. Nancy Dillard received counseling on the following healthy behaviors: nutrition, exercise and medication adherence            Discussed use, benefit, and side effects of prescribed medications. Barriers to medication compliance addressed. All patient questions answered. Pt voiced understanding. I hope this updates you on my evaluation and clinical thinking. Thank you for allowing me to participate in his care. Sincerely,      Yolanda Kiser MD       Please note that this chart was generated using voice recognition Dragon dictation software. Although every effort was made to ensure the accuracy of this automated transcription, some errors in transcription may have occurred.

## 2021-08-29 ENCOUNTER — APPOINTMENT (OUTPATIENT)
Dept: CT IMAGING | Age: 56
End: 2021-08-29
Payer: COMMERCIAL

## 2021-08-29 ENCOUNTER — HOSPITAL ENCOUNTER (EMERGENCY)
Age: 56
Discharge: HOME | End: 2021-08-29
Attending: EMERGENCY MEDICINE
Payer: COMMERCIAL

## 2021-08-29 ENCOUNTER — APPOINTMENT (OUTPATIENT)
Dept: GENERAL RADIOLOGY | Age: 56
End: 2021-08-29
Payer: COMMERCIAL

## 2021-08-29 VITALS
RESPIRATION RATE: 16 BRPM | HEIGHT: 63 IN | DIASTOLIC BLOOD PRESSURE: 73 MMHG | SYSTOLIC BLOOD PRESSURE: 99 MMHG | OXYGEN SATURATION: 98 % | WEIGHT: 161 LBS | HEART RATE: 69 BPM | BODY MASS INDEX: 28.53 KG/M2 | TEMPERATURE: 99.8 F

## 2021-08-29 DIAGNOSIS — R55 NEAR SYNCOPE: Primary | ICD-10-CM

## 2021-08-29 LAB
ABSOLUTE EOS #: 0.26 K/UL (ref 0–0.44)
ABSOLUTE IMMATURE GRANULOCYTE: <0.03 K/UL (ref 0–0.3)
ABSOLUTE LYMPH #: 1.11 K/UL (ref 1.1–3.7)
ABSOLUTE MONO #: 0.41 K/UL (ref 0.1–1.2)
ALBUMIN SERPL-MCNC: 4.1 G/DL (ref 3.5–5.2)
ALBUMIN/GLOBULIN RATIO: 1.5 (ref 1–2.5)
ALP BLD-CCNC: 79 U/L (ref 35–104)
ALT SERPL-CCNC: 17 U/L (ref 5–33)
ANION GAP SERPL CALCULATED.3IONS-SCNC: 10 MMOL/L (ref 9–17)
AST SERPL-CCNC: 22 U/L
BASOPHILS # BLD: 1 % (ref 0–2)
BASOPHILS ABSOLUTE: 0.04 K/UL (ref 0–0.2)
BILIRUB SERPL-MCNC: 0.24 MG/DL (ref 0.3–1.2)
BUN BLDV-MCNC: 13 MG/DL (ref 6–20)
BUN/CREAT BLD: 17 (ref 9–20)
CALCIUM SERPL-MCNC: 9.6 MG/DL (ref 8.6–10.4)
CHLORIDE BLD-SCNC: 103 MMOL/L (ref 98–107)
CO2: 25 MMOL/L (ref 20–31)
CREAT SERPL-MCNC: 0.75 MG/DL (ref 0.5–0.9)
DIFFERENTIAL TYPE: ABNORMAL
EOSINOPHILS RELATIVE PERCENT: 5 % (ref 1–4)
GFR AFRICAN AMERICAN: >60 ML/MIN
GFR NON-AFRICAN AMERICAN: >60 ML/MIN
GFR SERPL CREATININE-BSD FRML MDRD: ABNORMAL ML/MIN/{1.73_M2}
GFR SERPL CREATININE-BSD FRML MDRD: ABNORMAL ML/MIN/{1.73_M2}
GLUCOSE BLD-MCNC: 99 MG/DL (ref 70–99)
HCT VFR BLD CALC: 36.8 % (ref 36.3–47.1)
HEMOGLOBIN: 12.2 G/DL (ref 11.9–15.1)
IMMATURE GRANULOCYTES: 0 %
LYMPHOCYTES # BLD: 22 % (ref 24–43)
MCH RBC QN AUTO: 29 PG (ref 25.2–33.5)
MCHC RBC AUTO-ENTMCNC: 33.2 G/DL (ref 25.2–33.5)
MCV RBC AUTO: 87.4 FL (ref 82.6–102.9)
MONOCYTES # BLD: 8 % (ref 3–12)
NRBC AUTOMATED: 0 PER 100 WBC
PDW BLD-RTO: 13.2 % (ref 11.8–14.4)
PLATELET # BLD: 190 K/UL (ref 138–453)
PLATELET ESTIMATE: ABNORMAL
PMV BLD AUTO: 9.4 FL (ref 8.1–13.5)
POTASSIUM SERPL-SCNC: 3.7 MMOL/L (ref 3.7–5.3)
RBC # BLD: 4.21 M/UL (ref 3.95–5.11)
RBC # BLD: ABNORMAL 10*6/UL
SARS-COV-2, RAPID: NOT DETECTED
SEG NEUTROPHILS: 64 % (ref 36–65)
SEGMENTED NEUTROPHILS ABSOLUTE COUNT: 3.12 K/UL (ref 1.5–8.1)
SODIUM BLD-SCNC: 138 MMOL/L (ref 135–144)
SPECIMEN DESCRIPTION: NORMAL
TOTAL PROTEIN: 6.8 G/DL (ref 6.4–8.3)
TROPONIN INTERP: NORMAL
TROPONIN INTERP: NORMAL
TROPONIN T: NORMAL NG/ML
TROPONIN T: NORMAL NG/ML
TROPONIN, HIGH SENSITIVITY: <6 NG/L (ref 0–14)
TROPONIN, HIGH SENSITIVITY: <6 NG/L (ref 0–14)
WBC # BLD: 5 K/UL (ref 3.5–11.3)
WBC # BLD: ABNORMAL 10*3/UL

## 2021-08-29 PROCEDURE — 80053 COMPREHEN METABOLIC PANEL: CPT

## 2021-08-29 PROCEDURE — 84484 ASSAY OF TROPONIN QUANT: CPT

## 2021-08-29 PROCEDURE — 85025 COMPLETE CBC W/AUTO DIFF WBC: CPT

## 2021-08-29 PROCEDURE — 36415 COLL VENOUS BLD VENIPUNCTURE: CPT

## 2021-08-29 PROCEDURE — 99285 EMERGENCY DEPT VISIT HI MDM: CPT

## 2021-08-29 PROCEDURE — 87635 SARS-COV-2 COVID-19 AMP PRB: CPT

## 2021-08-29 PROCEDURE — 2580000003 HC RX 258: Performed by: EMERGENCY MEDICINE

## 2021-08-29 PROCEDURE — 70450 CT HEAD/BRAIN W/O DYE: CPT

## 2021-08-29 PROCEDURE — 93005 ELECTROCARDIOGRAM TRACING: CPT | Performed by: EMERGENCY MEDICINE

## 2021-08-29 PROCEDURE — 73630 X-RAY EXAM OF FOOT: CPT

## 2021-08-29 PROCEDURE — 72125 CT NECK SPINE W/O DYE: CPT

## 2021-08-29 PROCEDURE — 71045 X-RAY EXAM CHEST 1 VIEW: CPT

## 2021-08-29 RX ORDER — ACETAMINOPHEN 500 MG
500 TABLET ORAL EVERY 6 HOURS PRN
COMMUNITY

## 2021-08-29 RX ORDER — 0.9 % SODIUM CHLORIDE 0.9 %
1000 INTRAVENOUS SOLUTION INTRAVENOUS ONCE
Status: COMPLETED | OUTPATIENT
Start: 2021-08-29 | End: 2021-08-29

## 2021-08-29 RX ADMIN — SODIUM CHLORIDE 1000 ML: 9 INJECTION, SOLUTION INTRAVENOUS at 18:05

## 2021-08-29 ASSESSMENT — ENCOUNTER SYMPTOMS
SHORTNESS OF BREATH: 0
VOMITING: 0
DIARRHEA: 1
NAUSEA: 1
BACK PAIN: 0
BLOOD IN STOOL: 0
EYE PAIN: 0
CONSTIPATION: 0
COUGH: 0
ABDOMINAL PAIN: 0

## 2021-08-29 ASSESSMENT — PAIN DESCRIPTION - PAIN TYPE: TYPE: ACUTE PAIN

## 2021-08-29 ASSESSMENT — PAIN SCALES - GENERAL: PAINLEVEL_OUTOF10: 2

## 2021-08-29 ASSESSMENT — PAIN DESCRIPTION - LOCATION: LOCATION: HEAD

## 2021-08-29 NOTE — ED PROVIDER NOTES
[id number] [facility]  eMERGENCY dEPARTMENT eNCOUnter      Pt Name: Luisito Gunn  MRN: [id number]  [place] 1965  Date of evaluation: 8/29/2021      CHIEF COMPLAINT       Chief Complaint   Patient presents with    Diarrhea     began diarrhea 8-27-21    Fall     syncope x 2 at 0400, 8-29-21    Head Injury     struck head on tile floor          HISTORY OF PRESENT ILLNESS    Luisito Gunn is a 60 y.o. female who presents with an episode of syncope, patient states that started out Friday with body aches and then diarrhea had a bit of nausea she had diarrhea all day Saturday last night seems slow down she went to bed she had up to she got before which got before she felt lightheaded and then passed out in the bathroom she struck her head and she also jammed her left foot against side of the tub she says the diarrhea is still persisting but symptoms slowed down there is no abdominal pain no chest pain or shortness of breath  Patient says she has a bit of a headache she has a body aches she also caught the left anterior side of her chest against the bathtub when she fell and has some chest wall pain    REVIEW OF SYSTEMS         Review of Systems   Constitutional: Negative for chills and fever. HENT: Negative for congestion and ear pain. Eyes: Negative for pain and visual disturbance. Respiratory: Negative for cough and shortness of breath. Cardiovascular: Negative for chest pain, palpitations and leg swelling. Gastrointestinal: Positive for diarrhea and nausea. Negative for abdominal pain, blood in stool, constipation and vomiting. Endocrine: Negative for polydipsia and polyuria. Genitourinary: Negative for difficulty urinating, dysuria, frequency, vaginal bleeding and vaginal discharge. Musculoskeletal: Negative for back pain, joint swelling, myalgias, neck pain and neck stiffness. Left foot pain   Skin: Negative for rash.    Neurological: Positive for syncope and light-headedness. Negative for dizziness, weakness and headaches. Hematological: Negative for adenopathy. Does not bruise/bleed easily. Psychiatric/Behavioral: Negative for confusion, self-injury and suicidal ideas. PAST MEDICAL HISTORY    has a past medical history of Allergic rhinitis, Anxiety, Chronic tension headaches, Depression, DUB (dysfunctional uterine bleeding), Fatigue, GERD (gastroesophageal reflux disease), Hepatitis A, Internal hemorrhoids, Lateral epicondylitis, Low back pain, YOJANA on CPAP, Thyroid nodule, Trauma, and Uterine fibroid. SURGICAL HISTORY      has a past surgical history that includes  section; hysteroscopy (13); Dilation & curettage (13 and 2008); [place] tooth extraction; hysteroscopy (13); Dilation and curettage of uterus (13); Endometrial ablation (13); Colonoscopy (2014); EXCISION LESION HAND / FINGER (Right, 2019); US BREAST BIOPSY W LOC DEVICE 1ST LESION RIGHT (Right, 2021); MRI BIOPSY BREAST W LOC DEVICE RIGHT 1ST LESION (Right, 3/1/2021); Breast biopsy (Right); and Breast biopsy (Left). CURRENT MEDICATIONS       Previous Medications    ACETAMINOPHEN (TYLENOL) 500 MG TABLET    Take 500 mg by mouth every 6 hours as needed for Pain    CETIRIZINE (ZYRTEC) 10 MG TABLET    Take 10 mg by mouth daily    CHOLECALCIFEROL (VITAMIN D) 2000 UNITS CAPS CAPSULE    Take 2,000 Units by mouth daily     CIMETIDINE (TAGAMET) 200 MG TABLET    TAKE 1 TABLET BY MOUTH TWO TIMES A DAY AS NEEDED FOR HEATBURN     MULTIPLE VITAMINS-MINERALS (MULTIVITAL PO)    Take 1 tablet by mouth daily as needed     SERTRALINE (ZOLOFT) 100 MG TABLET    Take 1 tablet by mouth daily       ALLERGIES     is allergic to doxycycline, aspirin, and penicillins. FAMILY HISTORY     She indicated that her mother is alive. She indicated that her father is alive. She indicated that only one of her two sisters is alive.  She indicated that her maternal grandmother is . She indicated that her maternal grandfather is . She indicated that the status of her paternal grandmother is unknown. She indicated that the status of her paternal grandfather is unknown. She indicated that one of her two maternal aunts is . She indicated that the status of her paternal aunt is unknown.     family history includes Asthma in her father; Breast Cancer in her maternal aunt and other family members; Breast Cancer (age of onset: 48) in her maternal grandmother; Breast Cancer (age of onset: 61) in her mother; Cancer in her mother; Endometrial Cancer (age of onset: 61) in her sister; Heart Disease in her paternal grandmother; Hypertension in her father and mother; Cleaster Libman in her maternal grandfather; Barney [place] / [place] in her sister and sister; No Known Problems in her maternal aunt and paternal aunt; Other in her mother and paternal grandfather; Pacemaker in her father; Stroke in her father. SOCIAL HISTORY      reports that she has never smoked. She has never used smokeless tobacco. She reports current alcohol use. She reports that she does not use drugs. PHYSICAL EXAM     INITIAL VITALS:  height is 5' 3\" (1.6 m) and weight is 161 lb (73 kg). Her tympanic temperature is 99.8 °F (37.7 °C). Her blood pressure is 99/73 and her pulse is 69. Her respiration is 16 and oxygen saturation is 98%. Physical Exam  Constitutional:       Appearance: Normal appearance. She is well-developed. HENT:      Head: Normocephalic. Comments: Patient does have a contusion posterior occipital region     Right Ear: External ear normal.      Left Ear: External ear normal.   Eyes:      Conjunctiva/sclera: Conjunctivae normal.      Pupils: Pupils are equal, round, and reactive to light. Cardiovascular:      Rate and Rhythm: Normal rate and regular rhythm. Pulmonary:      Effort: Pulmonary effort is normal.      Breath sounds: Normal breath sounds.       Comments: Tender anterior left chest wall there is no crepitus breath sounds are equal  Chest:      Chest wall: Tenderness present. Abdominal:      General: Bowel sounds are normal.      Palpations: Abdomen is soft. Musculoskeletal:         General: Tenderness present. Cervical back: Normal range of motion. Comments: She has tenderness across the third fourth and fifth toe on the left foot there is no obvious deformity there is no tenderness at the ankle   Skin:     General: Skin is warm and dry. Neurological:      General: No focal deficit present. Mental Status: She is alert and oriented to person, place, and time. Psychiatric:         Behavior: Behavior normal.           DIFFERENTIAL DIAGNOSIS/ MDM:     Diarrhea body aches headache rule out Covid although patient has had the Covid vaccine and had Covid earlier this year.   She said she may have been exposed at a school function    DIAGNOSTIC RESULTS     EKG: All EKG's are interpreted by the Emergency Department Physician who either signs or Co-signs this chart in the absence of a cardiologist.    Normal sinus rhythm at 77 bpm LA interval is 144 ms QS duration 82 ms QT corrected 436 ms axis is 7    RADIOLOGY:   I directly visualized the following  images and reviewed the radiologist interpretations:     EXAMINATION:   CT OF THE HEAD WITHOUT CONTRAST  8/29/2021 3:09 pm       TECHNIQUE:   CT of the head was performed without the administration of intravenous   contrast. Dose modulation, iterative reconstruction, and/or weight based   adjustment of the mA/kV was utilized to reduce the radiation dose to as low   as reasonably achievable.       COMPARISON:   None.       HISTORY:   ORDERING SYSTEM PROVIDED HISTORY: Syncope, head injury   TECHNOLOGIST PROVIDED HISTORY:       Syncope, head injury   Decision Support Exception - unselect if not a suspected or confirmed   emergency medical condition->Emergency Medical Condition (MA)   Reason for Exam: Syncope episode; hit posterior head on tile floor; posterior   neck pain; headache   Acuity: Acute   Type of Exam: Initial       FINDINGS:   BRAIN/VENTRICLES: There is no acute intracranial hemorrhage, mass effect or   midline shift.  No abnormal extra-axial fluid collection.  The gray-white   differentiation is maintained without evidence of an acute infarct.  There is   moderate ventricular dilatation.  Peripheral sulci are normal.       ORBITS: The visualized portion of the orbits demonstrate no acute abnormality.       SINUSES: The visualized paranasal sinuses and mastoid air cells demonstrate   no acute abnormality.       SOFT TISSUES/SKULL:  No acute abnormality of the visualized skull or soft   tissues.           Impression   No acute intracranial abnormality.       Moderate ventriculomegaly suggesting normal pressure hydrocephalus. Recommend neuro surgical consultation if clinically indicated            CT OF THE CERVICAL SPINE WITHOUT CONTRAST 8/29/2021 3:09 pm       TECHNIQUE:   CT of the cervical spine was performed without the administration of   intravenous contrast. Multiplanar reformatted images are provided for review. Dose modulation, iterative reconstruction, and/or weight based adjustment of   the mA/kV was utilized to reduce the radiation dose to as low as reasonably   achievable.       COMPARISON:   None.       HISTORY:   ORDERING SYSTEM PROVIDED HISTORY: fall   TECHNOLOGIST PROVIDED HISTORY:   fall   Decision Support Exception - unselect if not a suspected or confirmed   emergency medical condition->Emergency Medical Condition (MA)   Reason for Exam: Syncope episode; hit posterior head on tile floor; posterior   neck pain; headache   Acuity: Acute   Type of Exam: Initial       FINDINGS:   The cervical spine demonstrates normalmineralization with straightening of   the  cervical lordosis. There is no evidence of fracture or subluxation.    There is loss of disc height with eburnation of the vertebral endplates at   the A0-8, C5-6, C6-7 levels.  There are anterior and posterior marginal   osteophytes at multiple levels. The central canal is grossly patent.  The   pedicles and posterior elements are intact.  The prevertebral and   paravertebral soft tissues are unremarkable. The atlanto-dens interval and   dens are intact. The visualized lung apices are clear.  Peritonsillar   calcifications.           Impression   Multilevel cervical spondylosis and degenerative disc disease.       Evidence of paracervical spasm.       No acute bony abnormalities are noted       RECOMMENDATIONS:   Further evaluation should be obtained with MR imaging if clinically indicated.            EXAMINATION:   ONE XRAY VIEW OF THE CHEST       8/29/2021 6:22 pm       COMPARISON:   Chest x-ray from 09/27/2011       HISTORY:   ORDERING SYSTEM PROVIDED HISTORY: shortness of breath   TECHNOLOGIST PROVIDED HISTORY:   shortness of breath   Reason for Exam: Syncope episode; hit posterior head on tile floor; posterior   neck pain; headache; hurts to take deep breath       FINDINGS:   Cardiac silhouette nonenlarged.  Mediastinal structures midline with mild   elongation thoracic aorta but no interval change.       No consolidation or sizable pleural effusion.  Interstitial prominence mid   lower zones, more at the bases and accentuated by superimposed breast tissue. No definite Kerley lines.       Bones appear intact.           Impression   No definite acute cardiopulmonary disease. EXAMINATION:   THREE XRAY VIEWS OF THE LEFT FOOT       8/29/2021 3:31 pm       COMPARISON:   None.       HISTORY:   ORDERING SYSTEM PROVIDED HISTORY: Fall   TECHNOLOGIST PROVIDED HISTORY:   Fall   Reason for Exam: Syncope episode; left lateral and dorsal foot pain   Acuity: Acute   Type of Exam: Initial       FINDINGS:   The visualized bones are normal.  There is no evidence of fracture or   dislocation.  The  joint spaces appear well maintained.  The soft tissues are   unremarkable. ED BEDSIDE ULTRASOUND:       LABS:  Labs Reviewed   COMPREHENSIVE METABOLIC PANEL - Abnormal; Notable for the following components:       Result Value    Total Bilirubin 0.24 (*)     All other components within normal limits   CBC WITH AUTO DIFFERENTIAL - Abnormal; Notable for the following components:    Lymphocytes 22 (*)     Eosinophils % 5 (*)     All other components within normal limits   COVID-19, RAPID   TROPONIN   TROPONIN           EMERGENCY DEPARTMENT COURSE:   Vitals:    Vitals:    08/29/21 1737 08/29/21 1800 08/29/21 1830   BP: (!) 106/90 110/70 99/73   Pulse: 78  69   Resp: 16     Temp: 99.8 °F (37.7 °C)     TempSrc: Tympanic     SpO2: 96% 97% 98%   Weight: 161 lb (73 kg)     Height: 5' 3\" (1.6 m)       -------------------------  BP: 99/73, Temp: 99.8 °F (37.7 °C), Pulse: 69, Resp: 16        Re-evaluation Notes      CRITICAL CARE:   None        CONSULTS:      PROCEDURES:  None    FINAL IMPRESSION    No diagnosis found. DISPOSITION/PLAN   DISPOSITION care passed to the oncoming physician pending results    Condition on Disposition    stable    PATIENT REFERRED TO:  No follow-up provider specified. DISCHARGE MEDICATIONS:  New Prescriptions    No medications on file       (Please note that portions of this note were completed with a voice recognition program.  Efforts were made to edit the dictations but occasionally words are mis-transcribed.)    Tiffanie Simon MD,, MD, F.A.A.E.M.   Attending Emergency Physician                          Tiffanie Simon MD  08/29/21 [id number]

## 2021-08-30 LAB
EKG ATRIAL RATE: 77 BPM
EKG P AXIS: 0 DEGREES
EKG P-R INTERVAL: 144 MS
EKG Q-T INTERVAL: 386 MS
EKG QRS DURATION: 82 MS
EKG QTC CALCULATION (BAZETT): 436 MS
EKG R AXIS: 7 DEGREES
EKG T AXIS: 24 DEGREES
EKG VENTRICULAR RATE: 77 BPM

## 2021-08-30 NOTE — ED PROVIDER NOTES
[address] [facility] ED  [address]  Phone: [phone]        ADDENDUM:      Care of this patient was assumed from Dr. Marta Hatfield the patient was seen for Diarrhea (began diarrhea 21), Fall (syncope x 2 at 0400, 21), and Head Injury (struck head on tile floor )  . The patient's initial evaluation and plan have been discussed with the prior provider who initially evaluated the patient. Nursing Notes, Past Medical Hx, Past Surgical Hx, Allergies, were all reviewed. PAST MEDICAL HISTORY    has a past medical history of Allergic rhinitis, Anxiety, Chronic tension headaches, Depression, DUB (dysfunctional uterine bleeding), Fatigue, GERD (gastroesophageal reflux disease), Hepatitis A, Internal hemorrhoids, Lateral epicondylitis, Low back pain, YOJANA on CPAP, Thyroid nodule, Trauma, and Uterine fibroid. SURGICAL HISTORY      has a past surgical history that includes  section; hysteroscopy (13); Dilation & curettage (13 and 2008); [place] tooth extraction; hysteroscopy (13); Dilation and curettage of uterus (13); Endometrial ablation (13); Colonoscopy (2014); EXCISION LESION HAND / FINGER (Right, 2019); US BREAST BIOPSY W LOC DEVICE 1ST LESION RIGHT (Right, 2021); MRI BIOPSY BREAST W LOC DEVICE RIGHT 1ST LESION (Right, 3/1/2021); Breast biopsy (Right); and Breast biopsy (Left).     CURRENT MEDICATIONS       Previous Medications    ACETAMINOPHEN (TYLENOL) 500 MG TABLET    Take 500 mg by mouth every 6 hours as needed for Pain    CETIRIZINE (ZYRTEC) 10 MG TABLET    Take 10 mg by mouth daily    CHOLECALCIFEROL (VITAMIN D) 2000 UNITS CAPS CAPSULE    Take 2,000 Units by mouth daily     CIMETIDINE (TAGAMET) 200 MG TABLET    TAKE 1 TABLET BY MOUTH TWO TIMES A DAY AS NEEDED FOR HEATBURN     MULTIPLE VITAMINS-MINERALS (MULTIVITAL PO)    Take 1 tablet by mouth daily as needed     SERTRALINE (ZOLOFT) 100 MG TABLET    Take 1 tablet by mouth daily       ALLERGIES     is allergic to doxycycline, aspirin, and penicillins. Diagnostic Results     EKG: All EKG's are interpreted by the Emergency Department Physician who either signs or Co-signs this chart in the [address] a cardiologist.        Mauricio Bowman:   Results for orders placed or performed during the hospital encounter of 08/29/21   COVID-19, Rapid    Specimen: Nasopharyngeal Swab   Result Value Ref Range    Specimen Description . NASOPHARYNGEAL SWAB     SARS-CoV-2, Rapid Not Detected Not Detected   Comprehensive Metabolic Panel   Result Value Ref Range    Glucose 99 70 - 99 mg/dL    BUN 13 6 - 20 mg/dL    CREATININE 0.75 0.50 - 0.90 mg/dL    Bun/Cre Ratio 17 9 - 20    Calcium 9.6 8.6 - 10.4 mg/dL    Sodium 138 135 - 144 mmol/L    Potassium 3.7 3.7 - 5.3 mmol/L    Chloride 103 98 - 107 mmol/L    CO2 25 20 - 31 mmol/L    Anion Gap 10 9 - 17 mmol/L    Alkaline Phosphatase 79 35 - 104 U/L    ALT 17 5 - 33 U/L    AST 22 <32 U/L    Total Bilirubin 0.24 (L) 0.3 - 1.2 mg/dL    Total Protein 6.8 6.4 - 8.3 g/dL    Albumin 4.1 3.5 - 5.2 g/dL    Albumin/Globulin Ratio 1.5 1.0 - 2.5    GFR Non-African American >60 >60 mL/min    GFR African American >60 >60 mL/min    GFR Comment          GFR Staging NOT REPORTED    CBC Auto Differential   Result Value Ref Range    WBC 5.0 3.5 - 11.3 k/uL    RBC 4.21 3.95 - 5.11 m/uL    Hemoglobin 12.2 11.9 - 15.1 g/dL    Hematocrit 36.8 36.3 - 47.1 %    MCV 87.4 82.6 - 102.9 fL    MCH 29.0 25.2 - 33.5 pg    MCHC 33.2 25.2 - 33.5 g/dL    RDW 13.2 11.8 - 14.4 %    Platelets 182 448 - 583 k/uL    MPV 9.4 8.1 - 13.5 fL    NRBC Automated 0.0 0.0 per 100 WBC    Differential Type NOT REPORTED     Seg Neutrophils 64 36 - 65 %    Lymphocytes 22 (L) 24 - 43 %    Monocytes 8 3 - 12 %    Eosinophils % 5 (H) 1 - 4 %    Basophils 1 0 - 2 %    Immature Granulocytes 0 0 %    Segs Absolute 3.12 1.50 - 8.10 k/uL    Absolute Lymph # 1.11 1.10 - 3.70 k/uL    Absolute Mono # 0.41 0.10 - 1.20 k/uL    Absolute Eos # 0.26 0.00 - 0.44 k/uL    Basophils Absolute 0.04 0.00 - 0.20 k/uL    Absolute Immature Granulocyte <0.03 0.00 - 0.30 k/uL    WBC Morphology NOT REPORTED     RBC Morphology NOT REPORTED     Platelet Estimate NOT REPORTED    Troponin   Result Value Ref Range    Troponin, High Sensitivity <6 0 - 14 ng/L    Troponin T NOT REPORTED <0.03 ng/mL    Troponin Interp NOT REPORTED    Troponin   Result Value Ref Range    Troponin, High Sensitivity <6 0 - 14 ng/L    Troponin T NOT REPORTED <0.03 ng/mL    Troponin Interp NOT REPORTED    EKG 12 Lead   Result Value Ref Range    Ventricular Rate 77 BPM    Atrial Rate 77 BPM    P-R Interval 144 ms    QRS Duration 82 ms    Q-T Interval 386 ms    QTc Calculation (Bazett) 436 ms    P Axis 0 degrees    R Axis 7 degrees    T Axis 24 degrees       RADIOLOGY:  XR FOOT LEFT (MIN 3 VIEWS)   Final Result   No acute bony abnormalities are noted         XR CHEST PORTABLE   Final Result   No definite acute cardiopulmonary disease. Somewhat more prominent interstitial mid lower zones, likely accentuated by   overlying breast tissue. Correlate clinically and with PFTs, if relevant. CT CERVICAL SPINE WO CONTRAST   Final Result   Multilevel cervical spondylosis and degenerative disc disease. Evidence of paracervical spasm. No acute bony abnormalities are noted      RECOMMENDATIONS:   Further evaluation should be obtained with MR imaging if clinically indicated. CT HEAD WO CONTRAST   Final Result   No acute intracranial abnormality. Moderate ventriculomegaly suggesting normal pressure hydrocephalus.    Recommend neuro surgical consultation if clinically indicated             RECENT VITALS:  BP: 99/73, Temp: 99.8 °F (37.7 °C), Pulse: 69, Resp: 16     ED Course     The patient was given the following medications:  Orders Placed This Encounter   Medications    0.9 % sodium chloride bolus       Medical Decision Making               EMERGENCY DEPARTMENT COURSE:   Vitals:    Vitals:    08/29/21 1737 08/29/21 1800 08/29/21 1830 08/29/21 1934   BP: (!) 106/90 110/70 99/73    Pulse: 78  69    Resp: 16   16   Temp: 99.8 °F (37.7 °C)      TempSrc: Tympanic      SpO2: 96% 97% 98% 98%   Weight: 161 lb (73 kg)      Height: 5' 3\" (1.6 m)        -------------------------  BP: 99/73, Temp: 99.8 °F (37.7 °C), Pulse: 69, Resp: 16      RE-EVALUATION:  Feeling better orthostatics normal up and ambulatory without difficulty labs are unremarkable CT did show some hydrocephalus but otherwise unremarkable she was informed that she will follow-up with her primary return if worse    CONSULTS:      PROCEDURES:  None    FINAL IMPRESSION      1. Near syncope          DISPOSITION/PLAN   DISPOSITION Decision To Discharge 08/29/2021 08:16:37 PM      CONDITION ON DISPOSITION:   Stable    PATIENT REFERRED TO:  Forrest Faulkner MD  [address] [id number]  [phone]    In 2 days        DISCHARGE MEDICATIONS:  New Prescriptions    No medications on file       (Please note that portions of this note were completed with a voicerecognition program.  Efforts were made to edit the dictations but occasionally words are mis-transcribed.)    Suzie Ashton MD,, MD, F.A.C.E.P.   Attending Emergency Medicine Physician                    Suzie Ashton MD  08/29/21 [id number]

## 2021-09-20 DIAGNOSIS — G91.2 NPH (NORMAL PRESSURE HYDROCEPHALUS) (HCC): Primary | ICD-10-CM

## 2021-09-20 NOTE — PROGRESS NOTES
MRI done on August 29 showed ventriculomegaly after a fall. Possibly has NPH. Will refer to neurosurgery.

## 2021-10-29 ENCOUNTER — OFFICE VISIT (OUTPATIENT)
Dept: NEUROSURGERY | Age: 56
End: 2021-10-29
Payer: COMMERCIAL

## 2021-10-29 VITALS
OXYGEN SATURATION: 98 % | WEIGHT: 158.4 LBS | SYSTOLIC BLOOD PRESSURE: 121 MMHG | HEIGHT: 63 IN | HEART RATE: 75 BPM | BODY MASS INDEX: 28.07 KG/M2 | DIASTOLIC BLOOD PRESSURE: 78 MMHG

## 2021-10-29 DIAGNOSIS — G93.89 CEREBRAL VENTRICULOMEGALY: Primary | ICD-10-CM

## 2021-10-29 PROCEDURE — 1036F TOBACCO NON-USER: CPT | Performed by: NEUROLOGICAL SURGERY

## 2021-10-29 PROCEDURE — G8427 DOCREV CUR MEDS BY ELIG CLIN: HCPCS | Performed by: NEUROLOGICAL SURGERY

## 2021-10-29 PROCEDURE — G8417 CALC BMI ABV UP PARAM F/U: HCPCS | Performed by: NEUROLOGICAL SURGERY

## 2021-10-29 PROCEDURE — 3017F COLORECTAL CA SCREEN DOC REV: CPT | Performed by: NEUROLOGICAL SURGERY

## 2021-10-29 PROCEDURE — G8484 FLU IMMUNIZE NO ADMIN: HCPCS | Performed by: NEUROLOGICAL SURGERY

## 2021-10-29 PROCEDURE — 99203 OFFICE O/P NEW LOW 30 MIN: CPT | Performed by: NEUROLOGICAL SURGERY

## 2021-10-29 NOTE — PROGRESS NOTES
[address]  [address] [facility], [address]  [place] [address]  Dept: [phone]  Loc: [phone]    Patient:  Juan Manuel Brice  YOB: 1965  Date: 10/29/21    The patient is a 60 y.o. female who presents today for consult of the following problems:     Chief Complaint   Patient presents with    Other     fall in august CT done, NPH note then             HPI:     Juan Manuel Brice is a 60 y.o. female on whom neurosurgical consultation was requested by Fernandez Whitaker MD for management of ventriculomegaly. Patient was seen in the ER at [facility] in late August with presentation after she had a syncopal fall from standing. She relays a period of illness where she was nauseous having persistent diarrhea became very dehydrated and was feeling so sick. She was diagnosed with a viral illness and was hydrated and subsequently discharged. Denies any prodromal symptoms including lightheadedness palpitations of any kind. Outside of that single event she did not relate any other events in terms of gait instability trouble walking veering to 1 side abnormal gait requiring the use of a cane or walker or generalized unsteadiness or ataxia. She denies any upper extremity steroid issues including numbness tingling or weakness. She denies any cognitive difficulty trouble with speech of any kind vision issues that are new or any type of incontinence. Has not had any other baseline issues in terms of balance or gait prior.       History:     Past Medical History:   Diagnosis Date    Allergic rhinitis     Anxiety     Originally 1990's, with panic    Chronic tension headaches     occasional    Depression     Zoloft started 1990's    DUB (dysfunctional uterine bleeding)     ablation    Fatigue     GERD (gastroesophageal reflux disease)     No prior scope as of PE 5/9/16- mild sx over yrs managed without meds    Hepatitis A 1969 age 8   Kira Coral Internal hemorrhoids     on scope     Lateral epicondylitis 3/11/2014    resolved    Low back pain     YOJANA on CPAP     sleep study  CPAP 12    Thyroid nodule     u/s  stable sub cm     Trauma     Uterine fibroid      Past Surgical History:   Procedure Laterality Date    BREAST BIOPSY Right     right stereotactic biopsy    BREAST BIOPSY Left     stereotactic biopsy     SECTION      COLONOSCOPY  2014    Zoë Leahy    DILATION AND CURETTAGE  13 and 2008    Dr Jane Ahuja  13    ENDOMETRIAL ABLATION  13    EXCISION LESION HAND / FINGER Right 2019    Excision Right Wrist ganglion cyst performed by Mabel Cho MD at [address] [place] [address] HYSTEROSCOPY  13    Dr Katherine [place]    HYSTEROSCOPY  13    MRI BREAST BX USING DEVICE RIGHT Right 3/1/2021    MRI BREAST BX USING DEVICE RIGHT 3/1/2021 [facility]    US BREAST NEEDLE BIOPSY RIGHT Right 2021    US BREAST NEEDLE BIOPSY RIGHT 2021 [facility] ULTRASOUND    WISDOM TOOTH EXTRACTION       Family History   Problem Relation Age of Onset    Asthma Father     Hypertension Father     Pacemaker Father     Stroke Father     Hypertension Mother    Learta Kobi Mother 61        Had genetic testing,  BRCA negative    Other Mother         knee replacements x2    Cancer Mother         non-Hodgkin's lymphoma    Miscarriages / Stillbirths Sister     Endometrial Cancer Sister 61        Stage IV  (2019)    Miscarriages / Stillbirths Sister     Breast Cancer Maternal Grandmother 48    Lung Cancer Maternal Grandfather     Heart Disease Paternal Grandmother     Other Paternal Grandfather         aneurysm    Breast Cancer Other         Mother's maternal aunt    Breast Cancer Maternal Aunt     Breast Cancer Other         Mother's maternal aunt    No Known Problems Maternal Aunt     No Known Problems Paternal Aunt      Current Outpatient Medications on File Prior to Visit   Medication Sig Dispense Refill    cimetidine (TAGAMET) 200 MG tablet TAKE 1 TABLET BY MOUTH TWO TIMES A DAY AS NEEDED FOR HEARTBURN  180 tablet 0    acetaminophen (TYLENOL) 500 MG tablet Take 500 mg by mouth every 6 hours as needed for Pain      cetirizine (ZYRTEC) 10 MG tablet Take 10 mg by mouth daily      sertraline (ZOLOFT) 100 MG tablet Take 1 tablet by mouth daily 90 tablet 3    Multiple Vitamins-Minerals (MULTIVITAL PO) Take 1 tablet by mouth daily as needed       Cholecalciferol (VITAMIN D) 2000 UNITS CAPS capsule Take 2,000 Units by mouth daily        No current facility-administered medications on file prior to visit. Social History     Tobacco Use    Smoking status: Never Smoker    Smokeless tobacco: Never Used   Substance Use Topics    Alcohol use: Yes     Alcohol/week: 0.0 standard drinks     Comment: socially    Drug use: No       Allergies   Allergen Reactions    Doxycycline      Urticaria      Aspirin Rash    Penicillins Swelling and Rash       Review of Systems  Constitutional: Negative for activity change and appetite change. HENT: Negative for ear pain and facial swelling. Eyes: Negative for discharge and itching. Respiratory: Negative for choking and chest tightness. Cardiovascular: Negative for chest pain and leg swelling. Gastrointestinal: Negative for nausea and abdominal pain. Endocrine: Negative for cold intolerance and heat intolerance. Genitourinary: Negative for frequency and flank pain. Musculoskeletal: Negative for myalgias and joint swelling. Skin: Negative for rash and wound. Allergic/Immunologic: Negative for environmental allergies and food allergies. Hematological: Negative for adenopathy. Does not bruise/bleed easily. Psychiatric/Behavioral: Negative for self-injury. The patient is not nervous/anxious.       Physical Exam:      /78 (Site: Left Upper Arm, Position: Sitting, Cuff Size: Medium Adult)   Pulse 75   Ht 5' 3\" (1.6 m)   Wt 158 lb 6.4 oz (71.8 kg)   SpO2 98%   BMI 28.06 kg/m²   Estimated body mass index is 28.06 kg/m² as calculated from the following:    Height as of this encounter: 5' 3\" (1.6 m). Weight as of this encounter: 158 lb 6.4 oz (71.8 kg). General:  Ra Matias is a 60y.o. year old female who appears her stated age. HEENT: Normocephalic atraumatic. Neck supple. Chest: regular rate; pulses equal  Abdomen: Soft nontender nondistended. Normoactive bowel sounds. Ext: DP and PT pulses 2+, good cap refill  Neuro    Mentation  Appropriate affect  Registration intact  Orientation intact  3 item recall intact  Judgement intact to situation    Cranial Nerves:   Pupils equal and reactive to light  Extraocular motion intact  Face and shrug symmetric  Tongue midline  No dysarthria  v1-3 sensation symmetric, masseter tone symmetric  Hearing symmetric and intact to finger rub    Sensation:   Intact    Motor  L deltoid 5/5; R deltoid 5/5  L biceps 5/5; R biceps 5/5  L triceps 5/5; R triceps 5/5  L wrist extension 5/5; R wrist extension 5/5  L intrinsics 5/5; R intrinsics 5/5     L iliopsoas 5/5 , R iliopsoas 5/5  L quadriceps 5/5; R quadriceps 5/5  L Dorsiflexion 5/5; R dorsiflexion 5/5  L Plantarflexion 5/5; R plantarflexion 5/5  L EHL 5/5; R EHL 5/5    Reflexes  L Brachioradialis 2+/4; R brachioradialis 2+/4  L Biceps 2+/4; R Biceps 2+/4  L Triceps 2+/4; R Triceps 2+/4  L Patellar 2+/4: R Patellar 2+/4  L Achilles 2+/4; R Achilles 2+/4    hoffmans L: neg  hoffmans R: neg  Clonus L: neg  Clonus R: neg  Babinski L: up  Babinski R; up    Studies Review:     CT head noncontrast shows diffuse ventriculomegaly relatively symmetric. No evidence of obstruction and A1 locus. Slight asymmetry with the right frontal and temporal horn versus left. Assessment and Plan:      1. Cerebral ventriculomegaly          Plan: no symptoms of communicating or obstructive hydrocephalus clinically.  triventricular

## 2021-11-15 ENCOUNTER — HOSPITAL ENCOUNTER (OUTPATIENT)
Dept: MRI IMAGING | Age: 56
Discharge: HOME | End: 2021-11-17
Payer: COMMERCIAL

## 2021-11-15 DIAGNOSIS — G93.89 CEREBRAL VENTRICULOMEGALY: ICD-10-CM

## 2021-11-15 PROCEDURE — 70553 MRI BRAIN STEM W/O & W/DYE: CPT

## 2021-11-15 PROCEDURE — 6360000004 HC RX CONTRAST MEDICATION: Performed by: NEUROLOGICAL SURGERY

## 2021-11-15 PROCEDURE — A9579 GAD-BASE MR CONTRAST NOS,1ML: HCPCS | Performed by: NEUROLOGICAL SURGERY

## 2021-11-15 RX ADMIN — GADOTERIDOL 15 ML: 279.3 INJECTION, SOLUTION INTRAVENOUS at 09:19

## 2021-12-03 ENCOUNTER — TELEPHONE (OUTPATIENT)
Dept: PULMONOLOGY | Age: 56
End: 2021-12-03

## 2021-12-03 ENCOUNTER — OFFICE VISIT (OUTPATIENT)
Dept: NEUROSURGERY | Age: 56
End: 2021-12-03
Payer: COMMERCIAL

## 2021-12-03 VITALS
HEART RATE: 80 BPM | HEIGHT: 63 IN | OXYGEN SATURATION: 97 % | SYSTOLIC BLOOD PRESSURE: 118 MMHG | BODY MASS INDEX: 28.31 KG/M2 | DIASTOLIC BLOOD PRESSURE: 77 MMHG | WEIGHT: 159.8 LBS

## 2021-12-03 DIAGNOSIS — G93.89 CEREBRAL VENTRICULOMEGALY: Primary | ICD-10-CM

## 2021-12-03 DIAGNOSIS — Z99.89 OSA ON CPAP: Primary | ICD-10-CM

## 2021-12-03 DIAGNOSIS — G47.33 OSA ON CPAP: Primary | ICD-10-CM

## 2021-12-03 PROCEDURE — 99214 OFFICE O/P EST MOD 30 MIN: CPT | Performed by: NEUROLOGICAL SURGERY

## 2021-12-03 PROCEDURE — 3017F COLORECTAL CA SCREEN DOC REV: CPT | Performed by: NEUROLOGICAL SURGERY

## 2021-12-03 PROCEDURE — G8417 CALC BMI ABV UP PARAM F/U: HCPCS | Performed by: NEUROLOGICAL SURGERY

## 2021-12-03 PROCEDURE — G8484 FLU IMMUNIZE NO ADMIN: HCPCS | Performed by: NEUROLOGICAL SURGERY

## 2021-12-03 PROCEDURE — G8427 DOCREV CUR MEDS BY ELIG CLIN: HCPCS | Performed by: NEUROLOGICAL SURGERY

## 2021-12-03 PROCEDURE — 1036F TOBACCO NON-USER: CPT | Performed by: NEUROLOGICAL SURGERY

## 2021-12-03 NOTE — TELEPHONE ENCOUNTER
Spoke with Johnnie Jones from [facility]. Due to patient having a recalled machine, the order needs to say Inline filter. She can get 1 filter per month. Please place order stating this, thanks.

## 2021-12-03 NOTE — PROGRESS NOTES
[address] [facility]  [address] [facility], [address] [place] [id number]  Dept: [phone]  Loc: [phone]    Patient:  Amisha Patel  YOB: 1965  Date: 12/3/21    The patient is a 60 y.o. female who presents today for consult of the following problems:     Chief Complaint   Patient presents with    Follow-up     MRI 11/15/21             HPI:     Amisha Patel is a 60 y.o. female on whom neurosurgical consultation was requested by Judah Cai MD for management of ventriculomegaly. Patient denies any issues with gait instability or falls and denies any issues with change in cognition or memory or changes in her bladder function or incontinence of any kind. Has had intermittent frontal headaches that have been on a persistent up that she correlates with the weather appear to be worse in the morning. Some occipital discomfort as well no radiating numbness tingling weakness. No double vision. Maritza Fanning       History:     Past Medical History:   Diagnosis Date    Allergic rhinitis     Anxiety     Originally , with panic    Chronic tension headaches     occasional    Depression     Zoloft started     DUB (dysfunctional uterine bleeding)     ablation    Fatigue     GERD (gastroesophageal reflux disease)     No prior scope as of PE 16- mild sx over yrs managed without meds    Hepatitis A 1969    age 8   Trudy Samreen Internal hemorrhoids     on scope     Lateral epicondylitis 3/11/2014    resolved    Low back pain     YOJANA on CPAP     sleep study  CPAP 12    Thyroid nodule     u/s  stable sub cm     Trauma     Uterine fibroid      Past Surgical History:   Procedure Laterality Date    BREAST BIOPSY Right     right stereotactic biopsy    BREAST BIOPSY Left     stereotactic biopsy     SECTION      COLONOSCOPY  2014    Zoë Leahy    DILATION AND CURETTAGE  13 and 2008     Sonja 8 AND CURETTAGE OF UTERUS  12/17/13    ENDOMETRIAL ABLATION  12/17/13    EXCISION LESION HAND / FINGER Right 5/21/2019    Excision Right Wrist ganglion cyst performed by Manny Nyhan, MD at [address] [place] [address] HYSTEROSCOPY  7/2/13    Dr David Tan    HYSTEROSCOPY  12/17/13    MRI BREAST BX USING DEVICE RIGHT Right 3/1/2021    MRI BREAST BX USING DEVICE RIGHT 3/1/2021 [facility]    US BREAST NEEDLE BIOPSY RIGHT Right 1/29/2021    US BREAST NEEDLE BIOPSY RIGHT 1/29/2021 [facility] ULTRASOUND    WISDOM TOOTH EXTRACTION       Family History   Problem Relation Age of Onset   [facility] Asthma Father     Hypertension Father     Pacemaker Father    [facility] Stroke Father     Hypertension Mother    [facility] Breast Cancer Mother 61        Had genetic testing,  BRCA negative    Other Mother         knee replacements x2    Cancer Mother         non-Hodgkin's lymphoma    Miscarriages / Stillbirths Sister     Endometrial Cancer Sister 61        Stage IV  (2019)    Miscarriages / Stillbirths Sister     Breast Cancer Maternal Grandmother 48    Lung Cancer Maternal Grandfather     Heart Disease Paternal Grandmother     Other Paternal Grandfather         aneurysm    Breast Cancer Other         Mother's maternal aunt    Breast Cancer Maternal Aunt     Breast Cancer Other         Mother's maternal aunt    No Known Problems Maternal Aunt     No Known Problems Paternal Aunt      Current Outpatient Medications on File Prior to Visit   Medication Sig Dispense Refill    cimetidine (TAGAMET) 200 MG tablet TAKE 1 TABLET BY MOUTH TWO TIMES A DAY AS NEEDED FOR HEARTBURN  180 tablet 0    acetaminophen (TYLENOL) 500 MG tablet Take 500 mg by mouth every 6 hours as needed for Pain      cetirizine (ZYRTEC) 10 MG tablet Take 10 mg by mouth daily      sertraline (ZOLOFT) 100 MG tablet Take 1 tablet by mouth daily 90 tablet 3    Multiple Vitamins-Minerals (MULTIVITAL PO) Take 1 tablet by mouth daily as needed       Cholecalciferol (VITAMIN D) 2000 UNITS CAPS capsule Take 2,000 Units by mouth daily        No current facility-administered medications on file prior to visit. Social History     Tobacco Use    Smoking status: Never Smoker    Smokeless tobacco: Never Used   Substance Use Topics    Alcohol use: Yes     Alcohol/week: 0.0 standard drinks     Comment: socially    Drug use: No       Allergies   Allergen Reactions    Doxycycline      Urticaria      Aspirin Rash    Penicillins Swelling and Rash       Review of Systems  Constitutional: Negative for activity change and appetite change. HENT: Negative for ear pain and facial swelling. Eyes: Negative for discharge and itching. Respiratory: Negative for choking and chest tightness. Cardiovascular: Negative for chest pain and leg swelling. Gastrointestinal: Negative for nausea and abdominal pain. Endocrine: Negative for cold intolerance and heat intolerance. Genitourinary: Negative for frequency and flank pain. Musculoskeletal: Negative for myalgias and joint swelling. Skin: Negative for rash and wound. Allergic/Immunologic: Negative for environmental allergies and food allergies. Hematological: Negative for adenopathy. Does not bruise/bleed easily. Psychiatric/Behavioral: Negative for self-injury. The patient is not nervous/anxious. Physical Exam:      /77 (Site: Left Upper Arm, Position: Sitting, Cuff Size: Large Adult)   Pulse 80   Ht 5' 3\" (1.6 m)   Wt 159 lb 12.8 oz (72.5 kg)   SpO2 97%   BMI 28.31 kg/m²   Estimated body mass index is 28.31 kg/m² as calculated from the following:    Height as of this encounter: 5' 3\" (1.6 m). Weight as of this encounter: 159 lb 12.8 oz (72.5 kg). General:  Jorden Griffiths is a 60y.o. year old female who appears her stated age. HEENT: Normocephalic atraumatic. Neck supple. Chest: regular rate; pulses equal  Abdomen: Soft nontender nondistended. Normoactive bowel sounds.   Ext: DP and PT pulses 2+, good cap refill  Neuro    Mentation  Appropriate affect  Registration intact  Orientation intact  3 item recall intact  Judgement intact to situation    Cranial Nerves:   Pupils equal and reactive to light  Extraocular motion intact  Face and shrug symmetric  Tongue midline  No dysarthria  v1-3 sensation symmetric, masseter tone symmetric  Hearing symmetric and intact to finger rub    Sensation:   Intact. Motor  L deltoid 5/5; R deltoid 5/5  L biceps 5/5; R biceps 5/5  L triceps 5/5; R triceps 5/5  L wrist extension 5/5; R wrist extension 5/5  L intrinsics 5/5; R intrinsics 5/5     L iliopsoas 5/5 , R iliopsoas 5/5  L quadriceps 5/5; R quadriceps 5/5  L Dorsiflexion 5/5; R dorsiflexion 5/5  L Plantarflexion 5/5; R plantarflexion 5/5  L EHL 5/5; R EHL 5/5    Reflexes  L Brachioradialis 2+/4; R brachioradialis 2+/4  L Biceps 2+/4; R Biceps 2+/4  L Triceps 2+/4; R Triceps 2+/4  L Patellar 2+/4: R Patellar 2+/4  L Achilles 2+/4; R Achilles 2+/4    hoffmans L: neg  hoffmans R: neg  Clonus L: neg  Clonus R: neg  Babinski L: up  Babinski R; up    Gait Within normal limits    Studies Review:     MRI brain with without contrast does not reveal any enhancing lesion. Right CP angle cyst along with ventriculomegaly with no evidence of flair signal or transependymal flow. Assessment and Plan:      1. Cerebral ventriculomegaly          Plan: Patient appears to have idiopathic cerebral ventriculomegaly with no evidence of elevated pressure or symptoms of a communicating hydrocephalus. Considering the lack of a distinct obstruction or symptomatic hydrocephalus would not intervene at this time. Recommend repeat MRI brain without contrast in 1yr    Followup: No follow-ups on file. Prescriptions Ordered:  No orders of the defined types were placed in this encounter.        Orders Placed:  Orders Placed This Encounter   Procedures    MRI BRAIN WO CONTRAST     Standing Status:   Future     Standing Expiration Date:   6/3/2023 Order Specific Question:   Reason for exam:     Answer:   need thin cut sagittal T2        Electronically signed by Sheree Deal DO on 12/3/2021 at 11:47 AM    Please note that this chart was generated using voice recognition Dragon dictation software. Although every effort was made to ensure the accuracy of this automated transcription, some errors in transcription may have occurred.

## 2021-12-03 NOTE — TELEPHONE ENCOUNTER
[employer] called needing a order for a new inlet filter for the recalled machine. She said she went to [facility] and they wouldn't use her order from August because it was not the right filter.

## 2021-12-18 ENCOUNTER — HOSPITAL ENCOUNTER (OUTPATIENT)
Dept: LAB | Age: 56
Discharge: HOME | End: 2021-12-18
Payer: COMMERCIAL

## 2021-12-18 DIAGNOSIS — Z00.00 PERIODIC HEALTH ASSESSMENT, GENERAL SCREENING, ADULT: ICD-10-CM

## 2021-12-18 LAB
ABSOLUTE EOS #: 0.22 K/UL (ref 0–0.44)
ABSOLUTE IMMATURE GRANULOCYTE: <0.03 K/UL (ref 0–0.3)
ABSOLUTE LYMPH #: 1.09 K/UL (ref 1.1–3.7)
ABSOLUTE MONO #: 0.4 K/UL (ref 0.1–1.2)
ALBUMIN SERPL-MCNC: 4.5 G/DL (ref 3.5–5.2)
ALBUMIN/GLOBULIN RATIO: 1.7 (ref 1–2.5)
ALP BLD-CCNC: 91 U/L (ref 35–104)
ALT SERPL-CCNC: 12 U/L (ref 5–33)
ANION GAP SERPL CALCULATED.3IONS-SCNC: 10 MMOL/L (ref 9–17)
AST SERPL-CCNC: 15 U/L
BASOPHILS # BLD: 1 % (ref 0–2)
BASOPHILS ABSOLUTE: 0.04 K/UL (ref 0–0.2)
BILIRUB SERPL-MCNC: 0.39 MG/DL (ref 0.3–1.2)
BUN BLDV-MCNC: 14 MG/DL (ref 6–20)
BUN/CREAT BLD: 19 (ref 9–20)
CALCIUM SERPL-MCNC: 9.8 MG/DL (ref 8.6–10.4)
CHLORIDE BLD-SCNC: 106 MMOL/L (ref 98–107)
CHOLESTEROL/HDL RATIO: 1.9
CHOLESTEROL: 109 MG/DL
CO2: 27 MMOL/L (ref 20–31)
CREAT SERPL-MCNC: 0.73 MG/DL (ref 0.5–0.9)
DIFFERENTIAL TYPE: ABNORMAL
EOSINOPHILS RELATIVE PERCENT: 4 % (ref 1–4)
GFR AFRICAN AMERICAN: >60 ML/MIN
GFR NON-AFRICAN AMERICAN: >60 ML/MIN
GFR SERPL CREATININE-BSD FRML MDRD: NORMAL ML/MIN/{1.73_M2}
GFR SERPL CREATININE-BSD FRML MDRD: NORMAL ML/MIN/{1.73_M2}
GLUCOSE BLD-MCNC: 86 MG/DL (ref 70–99)
HCT VFR BLD CALC: 38.2 % (ref 36.3–47.1)
HDLC SERPL-MCNC: 58 MG/DL
HEMOGLOBIN: 12.4 G/DL (ref 11.9–15.1)
IMMATURE GRANULOCYTES: 0 %
LDL CHOLESTEROL: 35 MG/DL (ref 0–130)
LYMPHOCYTES # BLD: 20 % (ref 24–43)
MCH RBC QN AUTO: 28.6 PG (ref 25.2–33.5)
MCHC RBC AUTO-ENTMCNC: 32.5 G/DL (ref 25.2–33.5)
MCV RBC AUTO: 88.2 FL (ref 82.6–102.9)
MONOCYTES # BLD: 7 % (ref 3–12)
NRBC AUTOMATED: 0 PER 100 WBC
PDW BLD-RTO: 13 % (ref 11.8–14.4)
PLATELET # BLD: 216 K/UL (ref 138–453)
PLATELET ESTIMATE: ABNORMAL
PMV BLD AUTO: 9.9 FL (ref 8.1–13.5)
POTASSIUM SERPL-SCNC: 3.9 MMOL/L (ref 3.7–5.3)
RBC # BLD: 4.33 M/UL (ref 3.95–5.11)
RBC # BLD: ABNORMAL 10*6/UL
SEG NEUTROPHILS: 68 % (ref 36–65)
SEGMENTED NEUTROPHILS ABSOLUTE COUNT: 3.7 K/UL (ref 1.5–8.1)
SODIUM BLD-SCNC: 143 MMOL/L (ref 135–144)
TOTAL PROTEIN: 7.1 G/DL (ref 6.4–8.3)
TRIGL SERPL-MCNC: 82 MG/DL
VLDLC SERPL CALC-MCNC: NORMAL MG/DL (ref 1–30)
WBC # BLD: 5.5 K/UL (ref 3.5–11.3)
WBC # BLD: ABNORMAL 10*3/UL

## 2021-12-18 PROCEDURE — 85025 COMPLETE CBC W/AUTO DIFF WBC: CPT

## 2021-12-18 PROCEDURE — 80053 COMPREHEN METABOLIC PANEL: CPT

## 2021-12-18 PROCEDURE — 80061 LIPID PANEL: CPT

## 2021-12-18 PROCEDURE — 36415 COLL VENOUS BLD VENIPUNCTURE: CPT

## 2021-12-21 ENCOUNTER — OFFICE VISIT (OUTPATIENT)
Dept: INTERNAL MEDICINE | Age: 56
End: 2021-12-21
Payer: COMMERCIAL

## 2021-12-21 VITALS
RESPIRATION RATE: 16 BRPM | DIASTOLIC BLOOD PRESSURE: 72 MMHG | BODY MASS INDEX: 28.21 KG/M2 | SYSTOLIC BLOOD PRESSURE: 127 MMHG | TEMPERATURE: 97.3 F | HEART RATE: 80 BPM | HEIGHT: 63 IN | WEIGHT: 159.2 LBS

## 2021-12-21 DIAGNOSIS — K21.9 GASTROESOPHAGEAL REFLUX DISEASE WITHOUT ESOPHAGITIS: ICD-10-CM

## 2021-12-21 DIAGNOSIS — N63.0 BREAST MASS: ICD-10-CM

## 2021-12-21 DIAGNOSIS — E55.9 VITAMIN D DEFICIENCY: ICD-10-CM

## 2021-12-21 DIAGNOSIS — Z00.00 PERIODIC HEALTH ASSESSMENT, GENERAL SCREENING, ADULT: Primary | ICD-10-CM

## 2021-12-21 DIAGNOSIS — G93.89 CEREBRAL VENTRICULOMEGALY: ICD-10-CM

## 2021-12-21 DIAGNOSIS — F33.1 MODERATE EPISODE OF RECURRENT MAJOR DEPRESSIVE DISORDER (HCC): ICD-10-CM

## 2021-12-21 PROCEDURE — G8427 DOCREV CUR MEDS BY ELIG CLIN: HCPCS | Performed by: INTERNAL MEDICINE

## 2021-12-21 PROCEDURE — 1036F TOBACCO NON-USER: CPT | Performed by: INTERNAL MEDICINE

## 2021-12-21 PROCEDURE — 3017F COLORECTAL CA SCREEN DOC REV: CPT | Performed by: INTERNAL MEDICINE

## 2021-12-21 PROCEDURE — G8417 CALC BMI ABV UP PARAM F/U: HCPCS | Performed by: INTERNAL MEDICINE

## 2021-12-21 PROCEDURE — G8484 FLU IMMUNIZE NO ADMIN: HCPCS | Performed by: INTERNAL MEDICINE

## 2021-12-21 PROCEDURE — 99214 OFFICE O/P EST MOD 30 MIN: CPT | Performed by: INTERNAL MEDICINE

## 2021-12-21 NOTE — PROGRESS NOTES
[facility]    Visit Date:  12/21/2021  Patient:  Santiago Lu  YOB: 1965    Assessment & Plan     Ventriculomegaly: Follows with neurosurgery. She has no symptoms of hydrocephalus or NPH at this time. We will continue surveillance MRIs. GERD: Continue Tagamet. We will continue to monitor. Depression: Continue Zoloft. Mood appears to be stable. Breast mass: Last biopsy showed benign fibrocystic changes, follows with general surgery. Will undergo surveillance mammograms. Diagnosis Orders   1. Periodic health assessment, general screening, adult  CBC Auto Differential    Comprehensive Metabolic Panel    Lipid Panel   2. Vitamin D deficiency  Vitamin D 25 Hydroxy   3. Gastroesophageal reflux disease without esophagitis     4. Moderate episode of recurrent major depressive disorder ([address]     5. Breast mass     6. Cerebral ventriculomegaly         Chief Complaint  Gastroesophageal Reflux, Depression, and Fatigue    History of Present Illness   Presents to follow-up. Says that she is doing okay at this time. She was recently diagnosed with ventriculomegaly after an episode of syncope, MRI of the brain showed ventriculomegaly, however she does not seem to have any symptoms suggestive of NPH or suggestive of hydrocephalus in general.  Neurosurgeon recommended repeat MRI in 6 months, but did not recommend intervention at this time. She herself says that she has not had any syncopal episodes since, says that she is feeling okay at this time. Denies fever, chills, headache, vision changes. Has a history of GERD, depression that are unchanged    Objective  /72 (Site: Left Upper Arm, Position: Sitting)   Pulse 80   Temp 97.3 °F (36.3 °C) (Tympanic)   Resp 16   Ht 5' 3\" (1.6 m)   Wt 159 lb 3.2 oz (72.2 kg)   BMI 28.20 kg/m²   Constitutional: No acute distress. Sits in chair comfortably  Eyes: Sclerae nonicteric.  No lid lag or proptosis  HENT: External ears normal. No external lesions on the nose  Neck: No gross masses. Trachea visibly midline  Respiratory: Good air entry bilaterally. No wheezing or crackles  Cardiovascular: Normal S1-S2. No murmurs. No lower extremity edema  Gastrointestinal: No visible masses. No visible hernias  Skin: No abnormal rashes. No abnormal masses  Neurologic: Cranial nerves grossly intact  Psychiatric: Normal affect.  Alert and oriented    Medications  Current Outpatient Medications:     cimetidine (TAGAMET) 200 MG tablet, TAKE 1 TABLET BY MOUTH TWO TIMES A DAY AS NEEDED FOR HEARTBURN , Disp: 180 tablet, Rfl: 0    acetaminophen (TYLENOL) 500 MG tablet, Take 500 mg by mouth every 6 hours as needed for Pain, Disp: , Rfl:     cetirizine (ZYRTEC) 10 MG tablet, Take 10 mg by mouth daily, Disp: , Rfl:     sertraline (ZOLOFT) 100 MG tablet, Take 1 tablet by mouth daily, Disp: 90 tablet, Rfl: 3    Multiple Vitamins-Minerals (MULTIVITAL PO), Take 1 tablet by mouth daily as needed , Disp: , Rfl:     Cholecalciferol (VITAMIN D) 2000 UNITS CAPS capsule, Take 2,000 Units by mouth daily , Disp: , Rfl:     Allergies  Doxycycline, Aspirin, and Penicillins    Past Medical History:   Diagnosis Date    Allergic rhinitis     Anxiety     Originally 1990's, with panic    Chronic tension headaches     occasional    Depression     Zoloft started 1990's    DUB (dysfunctional uterine bleeding)     ablation    Fatigue     GERD (gastroesophageal reflux disease)     No prior scope as of PE 5/9/16- mild sx over yrs managed without meds    Hepatitis A 1969    age 8   Rubi Up Internal hemorrhoids     on scope 2014    Lateral epicondylitis 3/11/2014    resolved    Low back pain     YOJANA on CPAP     sleep study 12/16 CPAP 12    Thyroid nodule     u/s 11/16 stable sub cm     Trauma     Uterine fibroid      Past Surgical History:   Procedure Laterality Date    BREAST BIOPSY Right     right stereotactic biopsy    BREAST BIOPSY Left stereotactic biopsy     SECTION      COLONOSCOPY  2014    Zoë Leahy    DILATION AND CURETTAGE  13 and 2008    Dr AUDI Joy Johanne UTERUS  13    ENDOMETRIAL ABLATION  13    EXCISION LESION HAND / FINGER Right 2019    Excision Right Wrist ganglion cyst performed by Vasiliy Rucker MD at [address] [place] [address] HYSTEROSCOPY  13    Dr Arron Espitia    HYSTEROSCOPY  13    MRI BREAST BX USING DEVICE RIGHT Right 3/1/2021    MRI BREAST BX USING DEVICE RIGHT 3/1/2021 [facility]    US BREAST NEEDLE BIOPSY RIGHT Right 2021    US BREAST NEEDLE BIOPSY RIGHT 2021 [id number] ULTRASOUND    WISDOM TOOTH EXTRACTION       Family History  This patient's family history includes Asthma in her father; Breast Cancer in her maternal aunt and other family members; Breast Cancer (age of onset: 48) in her maternal grandmother; Breast Cancer (age of onset: 61) in her mother; Cancer in her mother; Endometrial Cancer (age of onset: 61) in her sister; Heart Disease in her paternal grandmother; Hypertension in her father and mother; Grace Glaze in her maternal grandfather; Audrey Baldy / [place] in her sister and sister; No Known Problems in her maternal aunt and paternal aunt; Other in her mother and paternal grandfather; Pacemaker in her father; Stroke in her father. Social History  Rigo Dwyer  reports that she has never smoked. She has never used smokeless tobacco. She reports current alcohol use. She reports that she does not use drugs. Discussed use, benefit, and side effects of prescribed medications. All questions answered. Patient voiced understanding. Reviewed health maintenance. Electronically signed Favian Simmons MD on 2021 at 3:24 PM    This note has been created using the Epic electronic health record, and dictated in part by [employer] dictation system.  Despite the documenting physician's best efforts, there may be errors in spelling, grammar or syntax.

## 2021-12-28 ENCOUNTER — PATIENT MESSAGE (OUTPATIENT)
Dept: INTERNAL MEDICINE | Age: 56
End: 2021-12-28

## 2021-12-28 NOTE — TELEPHONE ENCOUNTER
I called and spoke to patient and gathered more information. Patient stated symptoms started on 12/24/2021 and began at the site of left shoulder blade, underneath band of bra and was itchy and skin was raised. She noticed on 12/26/2021 that rash had spread to front of left rib cage and to her breast. She described rash as itchy, raised skin, sore to the touch and is having trouble wearing a bra. Has no history of shingles and was vaccinated against shingles on 3/8/2019 and 5/10/2019. She prefers [facility] in [place], [place].

## 2021-12-29 ENCOUNTER — VIRTUAL VISIT (OUTPATIENT)
Dept: INTERNAL MEDICINE | Age: 56
End: 2021-12-29
Payer: COMMERCIAL

## 2021-12-29 DIAGNOSIS — B02.9 HERPES ZOSTER WITHOUT COMPLICATION: ICD-10-CM

## 2021-12-29 PROCEDURE — G8427 DOCREV CUR MEDS BY ELIG CLIN: HCPCS | Performed by: INTERNAL MEDICINE

## 2021-12-29 PROCEDURE — 99213 OFFICE O/P EST LOW 20 MIN: CPT | Performed by: INTERNAL MEDICINE

## 2021-12-29 PROCEDURE — 3017F COLORECTAL CA SCREEN DOC REV: CPT | Performed by: INTERNAL MEDICINE

## 2021-12-29 SDOH — ECONOMIC STABILITY: FOOD INSECURITY: WITHIN THE PAST 12 MONTHS, YOU WORRIED THAT YOUR FOOD WOULD RUN OUT BEFORE YOU GOT MONEY TO BUY MORE.: NEVER TRUE

## 2021-12-29 SDOH — ECONOMIC STABILITY: FOOD INSECURITY: WITHIN THE PAST 12 MONTHS, THE FOOD YOU BOUGHT JUST DIDN'T LAST AND YOU DIDN'T HAVE MONEY TO GET MORE.: NEVER TRUE

## 2021-12-29 ASSESSMENT — SOCIAL DETERMINANTS OF HEALTH (SDOH): HOW HARD IS IT FOR YOU TO PAY FOR THE VERY BASICS LIKE FOOD, HOUSING, MEDICAL CARE, AND HEATING?: NOT HARD AT ALL

## 2021-12-29 NOTE — PROGRESS NOTES
DR. Arely Mcrae - TELEHEALTH PROGRESS NOTE    CHIEF COMPLAINT/HISTORY OF CHIEF COMPLAINT: This 60 y.o.  female, patient of Dr. Regulo Villagomez, presents via TeleHealth visit today complaining of a rash on the left side of her body that started on Prema Maia and got worse over the next few days. It started around the left shoulder blade and then spread underneath her bra and then wrapped around to the front of her chest and on her left breast. The rash itched and she had the sensation of \"pins and needles\" in it. She wondered if it was shingles and was told the rash needed to be looked at, so she set up the virtual appointment. There are no other complaints. ALLERGIES/INTOLERANCES:   Allergies   Allergen Reactions    Doxycycline      Urticaria      Aspirin Rash    Penicillins Swelling and Rash       MEDICATIONS:   Outpatient Medications Marked as Taking for the 12/29/21 encounter (Virtual Visit) with Arnaldo Olivares, DO   Medication Sig Dispense Refill    cimetidine (TAGAMET) 200 MG tablet TAKE 1 TABLET BY MOUTH TWO TIMES A DAY AS NEEDED FOR HEARTBURN  180 tablet 0    acetaminophen (TYLENOL) 500 MG tablet Take 500 mg by mouth every 6 hours as needed for Pain      cetirizine (ZYRTEC) 10 MG tablet Take 10 mg by mouth daily      sertraline (ZOLOFT) 100 MG tablet Take 1 tablet by mouth daily 90 tablet 3    Multiple Vitamins-Minerals (MULTIVITAL PO) Take 1 tablet by mouth daily as needed       Cholecalciferol (VITAMIN D) 2000 UNITS CAPS capsule Take 2,000 Units by mouth daily          IMMUNIZATIONS: Reviewed for influenza and pneumococcal status as indicated in electronic record. REVIEW OF SYSTEMS:     Please see history of chief complaint above; otherwise no new problems with respect to General, HEENT, Cardiovascular, Respiratory, Gastrointestinal, Genitourinary, Endocrinologic, Musculoskeletal, or Neuropsychiatric complaints.       PHYSICAL EXAMINATION:    (Due to this being a TeleHealth encounter, evaluation of the following organ systems is limited: Vitals/General/Skin/HEENT/Lungs/Heart/Abdomen/Genitourinary/Musculoskeletal/Neurologic. Wt Readings from Last 2 Encounters:   12/21/21 159 lb 3.2 oz (72.2 kg)   12/03/21 159 lb 12.8 oz (72.5 kg)       Patient-Reported Vitals 12/29/2021   Patient-Reported Weight 159 lb   Patient-Reported Height 5' 3.6\"   Patient-Reported Temperature 97.6       General: This is a 60 y.o.  female who is alert and oriented to person, place and time. She appears to be her stated age and does not appear to be in any acute distress. She was able to follow commands. Affect appropriate for the situation. There were no hallucinations. Skin: Skin color, texture, turgor appears normal. There was a rash with the typical appearance of herpes zoster on her left shoulder blade and wrapping around to the front of her chest on the left side. HEENT/Neck: Head: Normal, normocephalic, atraumatic  Eye: Normal appearing external eye, conjunctiva, lids cornea. EOM appear intact bilaterally. Ears: Normal appearing external ears. Nose: Normal appearing external nose. No discharge. Pharynx: Mucous membranes appear moist.  Neck: No masses visualized. Pulmonary/Chest: Chest rises and falls symmetrically with inspiration and expiration. No accessory muscle use noted. Normal respiratory effort. No signs of difficulty breathing or respiratory distress visualized. Abdomen: Non-obese  Musculoskeletal: Normal appearing range of motion of neck. Extremities: Within the limitations of TeleHealth examination there does not appear to be any clubbing, cyanosis, or edema in any of the extremities. Neurologic: No gaze palsy. No facial asymmetry (Cranial Nerve 7 motor function). Exam limited due to video visit. Osteopathic Structural Examination: Unable to perform due to limitations of Telehealth. ASSESSMENT/PLAN:    1.  Herpes zoster without complication  - She does indeed have shingles  - We will give her some Valtrex 1000 mg three times a day for 7 days  - We offered to give her something for postherpetic neuralgia but she declined at this time      No orders of the defined types were placed in this encounter. Requested Prescriptions     Signed Prescriptions Disp Refills    valACYclovir (VALTREX) 1 g tablet 21 tablet 0     Sig: Take 1 tablet by mouth 3 times daily for 7 days       She will return to see Dr. Mini Cortez as previously scheduled or sooner if needed.          Electronically signed by Efrain Gentile DO on 12/30/2021 at 1:12 AM  Internal Medicine

## 2021-12-30 ENCOUNTER — HOSPITAL ENCOUNTER (OUTPATIENT)
Dept: MRI IMAGING | Age: 56
Discharge: HOME | End: 2022-01-01
Payer: COMMERCIAL

## 2021-12-30 DIAGNOSIS — R92.8 ABNORMAL MRI, BREAST: ICD-10-CM

## 2021-12-30 DIAGNOSIS — R92.8 ABNORMAL MAGNETIC RESONANCE IMAGING OF RIGHT BREAST: ICD-10-CM

## 2021-12-30 DIAGNOSIS — Z80.3 FAMILY HISTORY OF BREAST CANCER: ICD-10-CM

## 2021-12-30 PROCEDURE — A9579 GAD-BASE MR CONTRAST NOS,1ML: HCPCS | Performed by: SURGERY

## 2021-12-30 PROCEDURE — 77049 MRI BREAST C-+ W/CAD BI: CPT

## 2021-12-30 PROCEDURE — 6360000004 HC RX CONTRAST MEDICATION: Performed by: SURGERY

## 2021-12-30 RX ORDER — VALACYCLOVIR HYDROCHLORIDE 1 G/1
1000 TABLET, FILM COATED ORAL 3 TIMES DAILY
Qty: 21 TABLET | Refills: 0 | Status: SHIPPED | OUTPATIENT
Start: 2021-12-30 | End: 2022-01-06

## 2021-12-30 RX ADMIN — GADOTERIDOL 15 ML: 279.3 INJECTION, SOLUTION INTRAVENOUS at 11:36

## 2022-01-03 DIAGNOSIS — R92.8 ABNORMAL MRI, BREAST: Primary | ICD-10-CM

## 2022-01-31 ENCOUNTER — PATIENT MESSAGE (OUTPATIENT)
Dept: INTERNAL MEDICINE | Age: 57
End: 2022-01-31

## 2022-01-31 NOTE — TELEPHONE ENCOUNTER
From: Sivan Lin  To: Dr. Mallory Hagen: 1/31/2022 8:51 AM EST  Subject: Shingles - December 29, 2021    Stiven Schulz,    I did a virtual visit with you on December 29 regarding shingles. You prescribed Valtrex. Around January 8, 2022, my right eyelid became swollen and red. It seemed fine until late last week. Now, my right eyelid is again red and swollen and my eyes are watering. I'm wondering if I also have shingles in my eye.     Should I see you or my optometrist?    Thanks,    [facility]

## 2022-03-21 ENCOUNTER — PATIENT MESSAGE (OUTPATIENT)
Dept: INTERNAL MEDICINE | Age: 57
End: 2022-03-21

## 2022-03-21 DIAGNOSIS — F33.1 MODERATE EPISODE OF RECURRENT MAJOR DEPRESSIVE DISORDER (HCC): ICD-10-CM

## 2022-03-21 RX ORDER — SERTRALINE HYDROCHLORIDE 100 MG/1
100 TABLET, FILM COATED ORAL DAILY
Qty: 90 TABLET | Refills: 3 | Status: SHIPPED | OUTPATIENT
Start: 2022-03-21

## 2022-03-21 NOTE — TELEPHONE ENCOUNTER
From: Marcelina Cruz  To: Dr. Mejia Barrier: 3/21/2022 2:53 PM EDT  Subject: Sertraline Refill    Hello,    Please send a refill prescription for Sertraline 100MG to [facility] in [facility], [phone]. I prefer the 90 day supply prescription. Thank you.     Kelli Pardo

## 2022-03-29 ENCOUNTER — HOSPITAL ENCOUNTER (OUTPATIENT)
Age: 57
Setting detail: SPECIMEN
Discharge: HOME | End: 2022-03-29
Payer: COMMERCIAL

## 2022-03-29 ENCOUNTER — OFFICE VISIT (OUTPATIENT)
Dept: OBGYN | Age: 57
End: 2022-03-29
Payer: COMMERCIAL

## 2022-03-29 VITALS
HEART RATE: 85 BPM | BODY MASS INDEX: 28.17 KG/M2 | DIASTOLIC BLOOD PRESSURE: 80 MMHG | WEIGHT: 165 LBS | SYSTOLIC BLOOD PRESSURE: 120 MMHG | HEIGHT: 64 IN | OXYGEN SATURATION: 97 %

## 2022-03-29 DIAGNOSIS — Z78.0 POSTMENOPAUSAL: ICD-10-CM

## 2022-03-29 DIAGNOSIS — Z12.4 SCREENING FOR MALIGNANT NEOPLASM OF CERVIX: ICD-10-CM

## 2022-03-29 DIAGNOSIS — Z80.3 FAMILY HISTORY OF BREAST CANCER IN MOTHER: ICD-10-CM

## 2022-03-29 DIAGNOSIS — Z00.00 WELL WOMAN EXAM WITHOUT GYNECOLOGICAL EXAM: Primary | ICD-10-CM

## 2022-03-29 PROCEDURE — G0123 SCREEN CERV/VAG THIN LAYER: HCPCS

## 2022-03-29 PROCEDURE — 99396 PREV VISIT EST AGE 40-64: CPT | Performed by: NURSE PRACTITIONER

## 2022-03-29 PROCEDURE — 99212 OFFICE O/P EST SF 10 MIN: CPT

## 2022-03-29 PROCEDURE — 87624 HPV HI-RISK TYP POOLED RSLT: CPT

## 2022-03-29 ASSESSMENT — PATIENT HEALTH QUESTIONNAIRE - PHQ9
SUM OF ALL RESPONSES TO PHQ QUESTIONS 1-9: 1
1. LITTLE INTEREST OR PLEASURE IN DOING THINGS: 0
SUM OF ALL RESPONSES TO PHQ QUESTIONS 1-9: 1
SUM OF ALL RESPONSES TO PHQ9 QUESTIONS 1 & 2: 1
2. FEELING DOWN, DEPRESSED OR HOPELESS: 1

## 2022-03-31 LAB
HPV SAMPLE: NORMAL
HPV, GENOTYPE 16: NOT DETECTED
HPV, GENOTYPE 18: NOT DETECTED
HPV, HIGH RISK OTHER: NOT DETECTED
HPV, INTERPRETATION: NORMAL
SPECIMEN DESCRIPTION: NORMAL

## 2022-04-04 NOTE — PROGRESS NOTES
Subjective:      Patient ID: Tarun Lloyd  is a 59 [facility]  ,female coming into office regarding   Chief Complaint   Patient presents with    Gynecologic Exam       OB History    Para Term  AB Living   4 3 3   1 3   SAB IAB Ectopic Molar Multiple Live Births   1                # Outcome Date GA Lbr Valentin/2nd Weight Sex Delivery Anes PTL Lv   4 Term 1993    M       3 SAB 1992           2 Term 1988    F       1 Term 1985    M           This is a 58 y/o [id number]   female her for her well woman office visit. She is PM, denies any vaginal bleeding, urinary or rectal incontinence, and no abnormal vaginal discharge. She uses vasectomy as a BCM and her LMP was in  when she had an endometrial ablation for menorrhagia. The patient had a benign breast bx. Of the upper outer quadrant of her left breast. She's had genetic testing which was neg. She had an abnormal pap in  and had cryo x 4 times. She has a significant family hx. Of breast cancers: Mom with breast cancer at age 61  MGM breast cancer in 52's  Sister with endometrial cancer at age 61 and recently  of this disease  MGF lung cancer: The patient alternates every 6 months between getting breast MRI's and mammograms. Her last breast MRI was on 21 wi and withot contrast --Birads 2; so repeat a mammogram in 2022.     Past Medical History:   Diagnosis Date    Allergic rhinitis     Anxiety     Originally , with panic    Chronic tension headaches     occasional    Depression     Zoloft started     DUB (dysfunctional uterine bleeding)     ablation    Fatigue     GERD (gastroesophageal reflux disease)     No prior scope as of PE 16- mild sx over yrs managed without meds    Hepatitis A 1969    age 8    Internal hemorrhoids     on scope     Lateral epicondylitis 3/11/2014    resolved    Low back pain     YOJANA on CPAP     sleep study  CPAP 12  Thyroid nodule     u/s 11/16 stable sub cm     Trauma     Uterine fibroid      Review of Systems   Constitutional: Negative. Objective:     Vitals:    03/29/22 1330   BP: 120/80   Site: Left Upper Arm   Position: Sitting   Cuff Size: Medium Adult   Pulse: 85   SpO2: 97%   Weight: 165 lb (74.8 kg)   Height: 5' 3.5\" (1.613 m)      Physical Exam  Vitals reviewed. Constitutional:       Appearance: Normal appearance. HENT:      Head: Normocephalic. Cardiovascular:      Rate and Rhythm: Normal rate and regular rhythm. Pulses: Normal pulses. Heart sounds: Normal heart sounds. Pulmonary:      Effort: Pulmonary effort is normal.      Breath sounds: Normal breath sounds. Abdominal:      General: Abdomen is flat. Palpations: Abdomen is soft. Genitourinary:     General: Normal vulva. Skin:     General: Skin is warm and dry. Neurological:      Mental Status: She is alert. Inspection negative. No nipple discharge or bleeding. No palpable mass, No skin changes or dimpling    Vulva:appearance normal; no lesions  Vagina: pink, rugae, no abnormal discharge  Cervix: no ectocervix noted; scarred from several cryo surgeries; no CMT, no lesions noted  Uterus: small, mobile, NT  Ovaries: not palpated; nomasses    Sexually Active:Yes    Any bleeding or pain with intercourse: No    Last Sexual Encounter: Genital 3/28/22  Protected or Unprotected: no condoms    Last Pap 12/9/20 normal    Last HPV: not done    High Risk HPV:not done    Last Mammogram: Bilateral breast MRI done 12/31/21-Birads 2    Last Dexascan: not done    Do you do self breast exams: Yes      Assessment:      Diagnosis Orders   1. Screening for malignant neoplasm of cervix  PAP SMEAR   2. Annual gyn exam  3. PM  4. High risk for breast cancer  5.hx. of having Shingles and also received both shingles vaccines. 6. Hx. Of abnormal pap smears/cryo x 4        Plan:   1.  Cervical pap with HPV pending--if both negative then repeat cotesting in 5 years. 2. Order already processed for bilateral mammogram in   3. Spent 15 minutes discussing the option of continuing with alternating between breast MRI's and mammograms for next several years versus a bilateral prophylactic mastectomy w/or w/o implants. Will consider, check with insurance co. Etc.  4. Consider DEXA scan within next few years    No follow-ups on file. Orders Placed This Encounter   Procedures    PAP SMEAR     Patient History:    No LMP recorded. Patient has had an ablation. OBGYN Status: Ablation  Past Surgical History:  No date: BREAST BIOPSY; Right      Comment:  right stereotactic biopsy  No date: BREAST BIOPSY; Left      Comment:  stereotactic biopsy  No date:  SECTION  2014: COLONOSCOPY      Comment:  Lucy Feliz  11 and 2008: DILATION AND CURETTAGE      Comment:  Dr Frank Escobar  13: DILATION AND CURETTAGE OF UTERUS  13: ENDOMETRIAL ABLATION  2019: EXCISION LESION HAND / FINGER; Right      Comment:  Excision Right Wrist ganglion cyst performed by Hernan Gusman MD at [address] [place] [address]  13: HYSTEROSCOPY      Comment:  Dr Frank Escobar  13: HYSTEROSCOPY  3/1/2021: MRI BREAST BX USING DEVICE RIGHT; Right      Comment:  MRI BREAST BX USING DEVICE RIGHT 3/1/2021 [facility]  2021: US BREAST NEEDLE BIOPSY RIGHT; Right      Comment:  US BREAST NEEDLE BIOPSY RIGHT 2021 [facility] ULTRASOUND  No date: WISDOM TOOTH EXTRACTION    Problem List        Edg Problems Affecting Cytology    Increased risk of breast cancer      Social History    Tobacco Use      Smoking status: Never Smoker      Smokeless tobacco: Never Used       Standing Status:   Future     Number of Occurrences:   1     Standing Expiration Date:   3/29/2023     Order Specific Question:   Collection Type     Answer:    Thin Prep     Order Specific Question:   Prior Abnormal Pap Test     Answer:   No     Order Specific Question:   Screening or Diagnostic     Answer:   Screening     Order Specific Question:   HPV Requested?      Answer:   Yes     Order Specific Question:   High Risk Patient     Answer:   N/A       Electronically signed by:  JENY Iverson CNP

## 2022-04-06 LAB — CYTOLOGY REPORT: NORMAL

## 2022-04-25 ENCOUNTER — PATIENT MESSAGE (OUTPATIENT)
Dept: INTERNAL MEDICINE | Age: 57
End: 2022-04-25

## 2022-04-25 NOTE — TELEPHONE ENCOUNTER
From: Keiry Monroe  To: Dr. Carr Dash: 4/25/2022 10:05 AM EDT  Subject: Refill    Hello,    I would like to request a refill of my Cimetidine 200 mg tablet. Please call in the prescription to [facility] in [facility].     Thanks,    Cesar Shearer

## 2022-06-20 ENCOUNTER — HOSPITAL ENCOUNTER (OUTPATIENT)
Dept: LAB | Age: 57
Discharge: HOME | End: 2022-06-20
Payer: COMMERCIAL

## 2022-06-20 DIAGNOSIS — Z00.00 PERIODIC HEALTH ASSESSMENT, GENERAL SCREENING, ADULT: ICD-10-CM

## 2022-06-20 DIAGNOSIS — E55.9 VITAMIN D DEFICIENCY: ICD-10-CM

## 2022-06-20 LAB
ABSOLUTE EOS #: 0.26 K/UL (ref 0–0.44)
ABSOLUTE IMMATURE GRANULOCYTE: <0.03 K/UL (ref 0–0.3)
ABSOLUTE LYMPH #: 1.2 K/UL (ref 1.1–3.7)
ABSOLUTE MONO #: 0.31 K/UL (ref 0.1–1.2)
ALBUMIN SERPL-MCNC: 4.6 G/DL (ref 3.5–5.2)
ALBUMIN/GLOBULIN RATIO: 1.6 (ref 1–2.5)
ALP BLD-CCNC: 73 U/L (ref 35–104)
ALT SERPL-CCNC: 14 U/L (ref 5–33)
ANION GAP SERPL CALCULATED.3IONS-SCNC: 10 MMOL/L (ref 9–17)
AST SERPL-CCNC: 16 U/L
BASOPHILS # BLD: 1 % (ref 0–2)
BASOPHILS ABSOLUTE: 0.03 K/UL (ref 0–0.2)
BILIRUB SERPL-MCNC: 0.39 MG/DL (ref 0.3–1.2)
BUN BLDV-MCNC: 15 MG/DL (ref 6–20)
BUN/CREAT BLD: 18 (ref 9–20)
CALCIUM SERPL-MCNC: 9.8 MG/DL (ref 8.6–10.4)
CHLORIDE BLD-SCNC: 105 MMOL/L (ref 98–107)
CHOLESTEROL/HDL RATIO: 1.9
CHOLESTEROL: 123 MG/DL
CO2: 29 MMOL/L (ref 20–31)
CREAT SERPL-MCNC: 0.82 MG/DL (ref 0.5–0.9)
EOSINOPHILS RELATIVE PERCENT: 6 % (ref 1–4)
GFR AFRICAN AMERICAN: >60 ML/MIN
GFR NON-AFRICAN AMERICAN: >60 ML/MIN
GFR SERPL CREATININE-BSD FRML MDRD: NORMAL ML/MIN/{1.73_M2}
GLUCOSE BLD-MCNC: 97 MG/DL (ref 70–99)
HCT VFR BLD CALC: 38 % (ref 36.3–47.1)
HDLC SERPL-MCNC: 64 MG/DL
HEMOGLOBIN: 12.9 G/DL (ref 11.9–15.1)
IMMATURE GRANULOCYTES: 0 %
LDL CHOLESTEROL: 32 MG/DL (ref 0–130)
LYMPHOCYTES # BLD: 29 % (ref 24–43)
MCH RBC QN AUTO: 30 PG (ref 25.2–33.5)
MCHC RBC AUTO-ENTMCNC: 33.9 G/DL (ref 25.2–33.5)
MCV RBC AUTO: 88.4 FL (ref 82.6–102.9)
MONOCYTES # BLD: 7 % (ref 3–12)
NRBC AUTOMATED: 0 PER 100 WBC
PDW BLD-RTO: 12.9 % (ref 11.8–14.4)
PLATELET # BLD: 215 K/UL (ref 138–453)
PMV BLD AUTO: 9.1 FL (ref 8.1–13.5)
POTASSIUM SERPL-SCNC: 3.7 MMOL/L (ref 3.7–5.3)
RBC # BLD: 4.3 M/UL (ref 3.95–5.11)
SEG NEUTROPHILS: 57 % (ref 36–65)
SEGMENTED NEUTROPHILS ABSOLUTE COUNT: 2.38 K/UL (ref 1.5–8.1)
SODIUM BLD-SCNC: 144 MMOL/L (ref 135–144)
TOTAL PROTEIN: 7.5 G/DL (ref 6.4–8.3)
TRIGL SERPL-MCNC: 137 MG/DL
VITAMIN D 25-HYDROXY: 48 NG/ML
WBC # BLD: 4.2 K/UL (ref 3.5–11.3)

## 2022-06-20 PROCEDURE — 36415 COLL VENOUS BLD VENIPUNCTURE: CPT

## 2022-06-20 PROCEDURE — 80061 LIPID PANEL: CPT

## 2022-06-20 PROCEDURE — 82306 VITAMIN D 25 HYDROXY: CPT

## 2022-06-20 PROCEDURE — 85025 COMPLETE CBC W/AUTO DIFF WBC: CPT

## 2022-06-20 PROCEDURE — 80053 COMPREHEN METABOLIC PANEL: CPT

## 2022-06-23 ENCOUNTER — HOSPITAL ENCOUNTER (OUTPATIENT)
Dept: MAMMOGRAPHY | Age: 57
Discharge: HOME | End: 2022-06-25
Payer: COMMERCIAL

## 2022-06-23 ENCOUNTER — OFFICE VISIT (OUTPATIENT)
Dept: INTERNAL MEDICINE | Age: 57
End: 2022-06-23
Payer: COMMERCIAL

## 2022-06-23 VITALS
SYSTOLIC BLOOD PRESSURE: 128 MMHG | RESPIRATION RATE: 16 BRPM | WEIGHT: 165.6 LBS | DIASTOLIC BLOOD PRESSURE: 78 MMHG | OXYGEN SATURATION: 98 % | HEART RATE: 76 BPM | BODY MASS INDEX: 28.27 KG/M2 | HEIGHT: 64 IN

## 2022-06-23 VITALS — BODY MASS INDEX: 28.34 KG/M2 | HEIGHT: 64 IN | WEIGHT: 166 LBS

## 2022-06-23 DIAGNOSIS — Z00.00 PERIODIC HEALTH ASSESSMENT, GENERAL SCREENING, ADULT: ICD-10-CM

## 2022-06-23 DIAGNOSIS — R92.8 ABNORMAL MRI, BREAST: ICD-10-CM

## 2022-06-23 DIAGNOSIS — N63.0 BREAST MASS: ICD-10-CM

## 2022-06-23 DIAGNOSIS — Z80.3 FAMILY HISTORY OF BREAST CANCER: ICD-10-CM

## 2022-06-23 DIAGNOSIS — E55.9 VITAMIN D DEFICIENCY: Primary | ICD-10-CM

## 2022-06-23 DIAGNOSIS — K21.9 GASTROESOPHAGEAL REFLUX DISEASE WITHOUT ESOPHAGITIS: ICD-10-CM

## 2022-06-23 DIAGNOSIS — R92.8 ABNORMAL MAGNETIC RESONANCE IMAGING OF RIGHT BREAST: ICD-10-CM

## 2022-06-23 DIAGNOSIS — G93.89 CEREBRAL VENTRICULOMEGALY: ICD-10-CM

## 2022-06-23 PROCEDURE — 99214 OFFICE O/P EST MOD 30 MIN: CPT | Performed by: INTERNAL MEDICINE

## 2022-06-23 PROCEDURE — G8417 CALC BMI ABV UP PARAM F/U: HCPCS | Performed by: INTERNAL MEDICINE

## 2022-06-23 PROCEDURE — 77063 BREAST TOMOSYNTHESIS BI: CPT

## 2022-06-23 PROCEDURE — 1036F TOBACCO NON-USER: CPT | Performed by: INTERNAL MEDICINE

## 2022-06-23 PROCEDURE — G8427 DOCREV CUR MEDS BY ELIG CLIN: HCPCS | Performed by: INTERNAL MEDICINE

## 2022-06-23 PROCEDURE — 3017F COLORECTAL CA SCREEN DOC REV: CPT | Performed by: INTERNAL MEDICINE

## 2022-06-25 NOTE — PROGRESS NOTES
[facility] INTERNAL MEDICINE    Visit Date:  6/23/2022  Patient:  Erick Bowers  YOB: 1965    Assessment & Plan     Ventriculomegaly: Follows with neurosurgery. She has no symptoms of hydrocephalus or NPH at this time. We will continue surveillance MRIs. GERD: Continue Tagamet. We will continue to monitor. Depression: Continue Zoloft. Mood appears to be stable. Breast mass: Last biopsy showed benign fibrocystic changes, follows with general surgery. Will undergo alternating surveillance mammograms and ultrasound. Diagnosis Orders   1. Vitamin D deficiency  Vitamin D 25 Hydroxy   2. Periodic health assessment, general screening, adult  CBC with Auto Differential    Comprehensive Metabolic Panel    Lipid Panel   3. Gastroesophageal reflux disease without esophagitis     4. Breast mass     5. Cerebral ventriculomegaly         Chief Complaint  6 Month Follow-Up    History of Present Illness   Presents to follow-up. Says that she is doing okay at this time. No issues reported. She has a history of ventriculomegaly for which he follows neurosurgery, and has a history of breast mass for which he follows with general surgery, however she undergoes close surveillance due to family history of breast cancer. Objective  /78 (Site: Right Upper Arm, Position: Sitting, Cuff Size: Medium Adult)   Pulse 76   Resp 16   Ht 5' 3.5\" (1.613 m)   Wt 165 lb 9.6 oz (75.1 kg)   SpO2 98%   BMI 28.87 kg/m²   Constitutional: No acute distress. Sits in chair comfortably  Eyes: Sclerae nonicteric. No lid lag or proptosis  HENT: External ears normal. No external lesions on the nose  Neck: No gross masses. Trachea visibly midline  Respiratory: Good air entry bilaterally. No wheezing or crackles  Cardiovascular: Normal S1-S2. No murmurs. No lower extremity edema  Gastrointestinal: No visible masses. No visible hernias  Skin: No abnormal rashes.  No abnormal masses  Neurologic: Cranial nerves grossly intact  Psychiatric: Normal affect.  Alert and oriented    Medications  Current Outpatient Medications:     cimetidine (TAGAMET) 200 MG tablet, TAKE 1 TABLET BY MOUTH TWO TIMES A DAY AS NEEDED FOR HEARTBURN, Disp: 180 tablet, Rfl: 0    sertraline (ZOLOFT) 100 MG tablet, Take 1 tablet by mouth daily, Disp: 90 tablet, Rfl: 3    acetaminophen (TYLENOL) 500 MG tablet, Take 500 mg by mouth every 6 hours as needed for Pain , Disp: , Rfl:     cetirizine (ZYRTEC) 10 MG tablet, Take 10 mg by mouth daily, Disp: , Rfl:     Multiple Vitamins-Minerals (MULTIVITAL PO), Take 1 tablet by mouth daily as needed , Disp: , Rfl:     Cholecalciferol (VITAMIN D) 2000 UNITS CAPS capsule, Take 2,000 Units by mouth daily , Disp: , Rfl:     Allergies  Doxycycline, Aspirin, and Penicillins    Past Medical History:   Diagnosis Date    Allergic rhinitis     Anxiety     Originally , with panic    Chronic tension headaches     occasional    Depression     Zoloft started     DUB (dysfunctional uterine bleeding)     ablation    Fatigue     GERD (gastroesophageal reflux disease)     No prior scope as of PE 16- mild sx over yrs managed without meds    Hepatitis A 1969    age 8   [place] Internal hemorrhoids     on scope     Lateral epicondylitis 3/11/2014    resolved    Low back pain     YOJANA on CPAP     sleep study  CPAP 12    Thyroid nodule     u/s  stable sub cm     Trauma     Uterine fibroid      Past Surgical History:   Procedure Laterality Date    BREAST BIOPSY Right     right stereotactic biopsy    BREAST BIOPSY Left     stereotactic biopsy     SECTION      COLONOSCOPY  2014    Zoë Leahy    DILATION AND CURETTAGE  13 and 2008    Dr Daniel Spears OF UTERUS  13    ENDOMETRIAL ABLATION  13    EXCISION LESION HAND / FINGER Right 2019    Excision Right Wrist ganglion cyst performed by Caleb Harvey MD at [facility] OR    HYSTEROSCOPY  7/2/13    Dr Giselle Dotson    HYSTEROSCOPY  12/17/13    MRI BREAST BX USING DEVICE RIGHT Right 3/1/2021    MRI BREAST BX USING DEVICE RIGHT 3/1/2021 [facility]    US BREAST NEEDLE BIOPSY RIGHT Right 1/29/2021    US BREAST NEEDLE BIOPSY RIGHT 1/29/2021 [facility] ULTRASOUND    WISDOM TOOTH EXTRACTION       Family History  This patient's family history includes Asthma in her father; Breast Cancer in her maternal aunt and other family members; Breast Cancer (age of onset: 48) in her maternal grandmother; Breast Cancer (age of onset: 61) in her mother; Cancer in her mother; Endometrial Cancer (age of onset: 61) in her sister; Heart Disease in her paternal grandmother; Hypertension in her father and mother; Johnnye Sports in her maternal grandfather; Timmothy Rowels / [place] in her sister and sister; No Known Problems in her maternal aunt and paternal aunt; Other in her mother and paternal grandfather; Pacemaker in her father; Stroke in her father. Social History  Nancy Dillard  reports that she has never smoked. She has never used smokeless tobacco. She reports current alcohol use. She reports that she does not use drugs. Discussed use, benefit, and side effects of prescribed medications. All questions answered. Patient voiced understanding. Reviewed health maintenance. Electronically signed Song Ruiz MD on 6/23/2022 at 9:34 AM    This note has been created using the Epic electronic health record, and dictated in part by [email or web address] [facility] dictation system. Despite the documenting physician's best efforts, there may be errors in spelling, grammar or syntax.

## 2022-09-08 ENCOUNTER — OFFICE VISIT (OUTPATIENT)
Dept: PULMONOLOGY | Age: 57
End: 2022-09-08
Payer: COMMERCIAL

## 2022-09-08 VITALS
SYSTOLIC BLOOD PRESSURE: 126 MMHG | DIASTOLIC BLOOD PRESSURE: 84 MMHG | HEART RATE: 87 BPM | BODY MASS INDEX: 28.51 KG/M2 | WEIGHT: 167 LBS | OXYGEN SATURATION: 98 % | TEMPERATURE: 96.8 F | HEIGHT: 64 IN

## 2022-09-08 DIAGNOSIS — Z99.89 OSA ON CPAP: Primary | ICD-10-CM

## 2022-09-08 DIAGNOSIS — G47.33 OSA ON CPAP: Primary | ICD-10-CM

## 2022-09-08 PROCEDURE — 99213 OFFICE O/P EST LOW 20 MIN: CPT | Performed by: INTERNAL MEDICINE

## 2022-09-08 PROCEDURE — G8427 DOCREV CUR MEDS BY ELIG CLIN: HCPCS | Performed by: INTERNAL MEDICINE

## 2022-09-08 PROCEDURE — 1036F TOBACCO NON-USER: CPT | Performed by: INTERNAL MEDICINE

## 2022-09-08 PROCEDURE — 3017F COLORECTAL CA SCREEN DOC REV: CPT | Performed by: INTERNAL MEDICINE

## 2022-09-08 PROCEDURE — G8417 CALC BMI ABV UP PARAM F/U: HCPCS | Performed by: INTERNAL MEDICINE

## 2022-09-08 NOTE — PROGRESS NOTES
REASON FOR THE CONSULTATION:  montez   HISTORY OF PRESENT ILLNESS:         Prince Sam is a 59 y.o. old female who comes in for follow up regarding her obstructive sleep apnea. She is currently doing well using her positive airway pressure device. She is sleeping better at night with her machine and says she has less daytime sleepiness. There have been no driving issues and concentration is better when awake. The machine pressure settings are comfortable, the mask is reasonably comfortable and she is using the humidity. There have been no ER visits, hospital visits, any new issues or medication changes since the last visit here in sleep clinic. Last visit   Patient denies any aerophagia. Today, he has tolerated the CPAP at 8 cm of water pressure. Compliance data from 9/23/2017.-10/22/2017 shows 93.3% compliance and AHI is 4.9. She does feel fatigued and tired during the day, particularly while driving. She takes Zanaflex at night, but tells me that she only takes this intermittently. She is also on Zoloft 100 mg for depression. She gets up once at night to go to the bathroom. Her sleep time is 10 or 11 PM and wake time is 6 AM.  Sleep Medicine 10/24/2017   Sitting and reading 1   Watching TV 1   Sitting, inactive in a public place (e.g. a theatre or a meeting) 0   As a passenger in a car for an hour without a break 2   Lying down to rest in the afternoon when circumstances permit 1   Sitting and talking to someone 0   Sitting quietly after a lunch without alcohol 0   In a car, while stopped for a few minutes in traffic 3   [place] Sleepiness Score 8                Previous visit   evaluation of evaluation of MONTEZ. Patient's initial sleep study was done 11/1/2016 and was found to have an AHI of 17.8. She was titrated to a CPAP of 12 cm of water pressure with a nasal pillow. Even during the titration night.   Patient noticed that she was bloated, had lot of distention of her abdomen and passing gas.  At that time she was having a full face mask. Since December 7, 2016 when she had her titration night. Patient has been using the CPAP. Her provider is [employer]. Compliance is 76.7% age. Average usage is 4 hours and 49 minutes. This is set at a ramp the pressure setting is 5 cm of water at that time. Start pressure  She still complains of excessive bloating aerophagia and passing gas. Has tried different strategies without success        She had the sleep study because she was tired during the day and was falling asleep at the week since she has used the CPAP. She is not as tired. She feels well rested and is not dozing off at the [place], but because of the constant burping, bloating and gaseous feeling. After using CPAP.   She is concerned and would like to see if we have any intervention      Immunization   Immunization History   Administered Date(s) Administered    COVID-19, [address] [facility] border, Primary or Immunocompromised, (age 12y+), IM, 100 mcg/0.5mL 03/13/2021, 06/29/2021, 01/14/2022    Rabies 09/01/2018, 09/07/2018, 09/11/2018, 09/18/2018    Tdap (Boostrix, Adacel) 01/11/2017    Zoster Recombinant (Shingrix) 03/08/2019, 05/10/2019        Pneumococcal Vaccine     [x] not indicated until age of 72 [] Indicated   [] Refused  [] Contraindicated       Influenza Vaccine   [] Up to date    [] Indicated   [x] Refused  [] Contraindicated     PAST MEDICAL HISTORY:       Diagnosis Date    Allergic rhinitis     Anxiety     Originally 1990's, with panic    Chronic tension headaches     occasional    Depression     Zoloft started 1990's    DUB (dysfunctional uterine bleeding)     ablation    Fatigue     GERD (gastroesophageal reflux disease)     No prior scope as of PE 5/9/16- mild sx over yrs managed without meds    Hepatitis A 1969    age 8    Internal hemorrhoids     on scope 2014    Lateral epicondylitis 3/11/2014    resolved    Low back pain     YOJANA on CPAP     sleep study 12/16 CPAP 12 Thyroid nodule     u/s  stable sub cm     Trauma     Uterine fibroid          Family History:       Problem Relation Age of Onset    Asthma Father     Hypertension Father     Pacemaker Father     Stroke Father     Hypertension Mother     Breast Cancer Mother 61        Had genetic testing,  BRCA negative    Other Mother         knee replacements x2    Cancer Mother         non-Hodgkin's lymphoma    Miscarriages / Stillbirths Sister     Endometrial Cancer Sister 61        Stage IV  (2019)    [de-identified] / Stillbirths Sister     Breast Cancer Maternal Grandmother 48    Lung Cancer Maternal Grandfather     Heart Disease Paternal Grandmother     Other Paternal Grandfather         aneurysm    Breast Cancer Other         Mother's maternal aunt    Breast Cancer Maternal Aunt     Breast Cancer Other         Mother's maternal aunt    No Known Problems Maternal Aunt     No Known Problems Paternal Aunt        SURGICAL HISTORY:   Past Surgical History:   Procedure Laterality Date    BREAST BIOPSY Right     right stereotactic biopsy    BREAST BIOPSY Left     stereotactic biopsy     SECTION      COLONOSCOPY  2014    Zoë Leahy    DILATION AND CURETTAGE  11 and 2008    Dr AUDI Brandt Mouse OF UTERUS  13    ENDOMETRIAL ABLATION  13    EXCISION LESION HAND / FINGER Right 2019    Excision Right Wrist ganglion cyst performed by Omar Poon MD at [address]  13    Dr Zoë Pino    HYSTEROSCOPY  13    MRI BREAST BX USING DEVICE RIGHT Right 3/1/2021    MRI BREAST BX USING DEVICE RIGHT 3/1/2021 [facility]    US BREAST NEEDLE BIOPSY RIGHT Right 2021    US BREAST NEEDLE BIOPSY RIGHT 2021 [id number] ULTRASOUND    WISDOM TOOTH EXTRACTION             SOCIAL AND OCCUPATIONAL HEALTH:      There  no history of TB or TB exposure. There  no asbestos or silica dust exposure. The patient reports  no coal, foundry, quarry or [employer] exposure. Travel history reveals no. There  no history of recreational or IV drug use. There  no hot tube exposure. Pets  no    Occupational history administered a persistent    TOBACCO:   reports that she has never smoked. She has never used smokeless tobacco.  ETOH:   reports current alcohol use. ALLERGIES:      Allergies   Allergen Reactions    Doxycycline      Urticaria      Aspirin Rash    Penicillins Swelling and Rash         Home Meds:   Prior to Admission medications    Medication Sig Start Date End Date Taking? Authorizing Provider   cimetidine (TAGAMET) 200 MG tablet TAKE 1 TABLET BY MOUTH TWO TIMES A DAY AS NEEDED FOR HEARTBURN 4/25/22  Yes Sky Bright, DO   sertraline (ZOLOFT) 100 MG tablet Take 1 tablet by mouth daily 3/21/22  Yes Sky IESHA Bright, DO   cetirizine (ZYRTEC) 10 MG tablet Take 10 mg by mouth daily   Yes Historical Provider, MD   Multiple Vitamins-Minerals (MULTIVITAL PO) Take 1 tablet by mouth daily as needed    Yes Historical Provider, MD   Cholecalciferol (VITAMIN D) 2000 UNITS CAPS capsule Take 2,000 Units by mouth daily    Yes Historical Provider, MD   acetaminophen (TYLENOL) 500 MG tablet Take 500 mg by mouth every 6 hours as needed for Pain   Patient not taking: Reported on 9/8/2022    Historical Provider, MD        Review of Systems -  General ROS: negative for - chills, fatigue, fever or weight loss  ENT ROS: negative for - headaches, oral lesions or sore throat  Cardiovascular ROS: no chest pain , orthopnea or pnd   Gastrointestinal ROS: no abdominal pain, change in bowel habits, or black or bloody stools  Skin - no rash   Neuro - no blurry vision , no loc .  No focal weakness     Vitals:  /84   Pulse 87   Temp 96.8 °F (36 °C)   Ht 5' 4\" (1.626 m)   Wt 167 lb (75.8 kg)   SpO2 98%   BMI 28.67 kg/m²     PHYSICAL EXAM:  Head and neck atraumatic, normocephalic    Lymph nodes-no cervical, supraclavicular lymphadenopathy    Neck-no JVP elevation    Lungs - Ventilating all lobes without any rales, rhonchi, wheezes or dullness. No bronchial breath sounds. Chest expansion equal bilaterally    CVS- S1, S2 regular. No S3 no S4, no murmurs    Abdomen-nontender, nondistended. Bowel sounds are present. No organomegaly    Lower extremity-no edema    Upper extremity-no edema    Neurological-grossly normal cranial nerves. No overt motor deficit                 IMPRESSION:     Diagnosis Orders   1. YOJANA on CPAP  DME Order for CPAP as OP           :                PLAN:      CPAP at least 4 hrs qhs  Wt loss is recommended and discussed  Follow good sleep hygeine instructions  Use humidifier if needed  Questions answered pertaining to diagnosis and management explained importance of compliance with therapy   Compliance data reviewed and pt is compliant per insurance requirements     Requested Prescriptions      No prescriptions requested or ordered in this encounter       There are no discontinued medications. Lowell Pedersen received counseling on the following healthy behaviors: nutrition, exercise and medication adherence            Discussed use, benefit, and side effects of prescribed medications. Barriers to medication compliance addressed. All patient questions answered. Pt voiced understanding. I hope this updates you on my evaluation and clinical thinking. Thank you for allowing me to participate in his care. Sincerely,      Nima Walters MD       Please note that this chart was generated using voice recognition Dragon dictation software. Although every effort was made to ensure the accuracy of this automated transcription, some errors in transcription may have occurred.

## 2022-10-10 ENCOUNTER — OFFICE VISIT (OUTPATIENT)
Dept: INTERNAL MEDICINE | Age: 57
End: 2022-10-10
Payer: COMMERCIAL

## 2022-10-10 VITALS
OXYGEN SATURATION: 96 % | HEIGHT: 64 IN | BODY MASS INDEX: 29.19 KG/M2 | SYSTOLIC BLOOD PRESSURE: 138 MMHG | WEIGHT: 171 LBS | HEART RATE: 81 BPM | DIASTOLIC BLOOD PRESSURE: 72 MMHG | RESPIRATION RATE: 16 BRPM

## 2022-10-10 DIAGNOSIS — G93.89 CEREBRAL VENTRICULOMEGALY: Primary | ICD-10-CM

## 2022-10-10 DIAGNOSIS — K21.9 GASTROESOPHAGEAL REFLUX DISEASE WITHOUT ESOPHAGITIS: ICD-10-CM

## 2022-10-10 DIAGNOSIS — F33.1 MODERATE EPISODE OF RECURRENT MAJOR DEPRESSIVE DISORDER (HCC): ICD-10-CM

## 2022-10-10 PROCEDURE — G8417 CALC BMI ABV UP PARAM F/U: HCPCS | Performed by: INTERNAL MEDICINE

## 2022-10-10 PROCEDURE — 1036F TOBACCO NON-USER: CPT | Performed by: INTERNAL MEDICINE

## 2022-10-10 PROCEDURE — 3017F COLORECTAL CA SCREEN DOC REV: CPT | Performed by: INTERNAL MEDICINE

## 2022-10-10 PROCEDURE — 99214 OFFICE O/P EST MOD 30 MIN: CPT | Performed by: INTERNAL MEDICINE

## 2022-10-10 PROCEDURE — G8484 FLU IMMUNIZE NO ADMIN: HCPCS | Performed by: INTERNAL MEDICINE

## 2022-10-10 PROCEDURE — G8427 DOCREV CUR MEDS BY ELIG CLIN: HCPCS | Performed by: INTERNAL MEDICINE

## 2022-10-10 NOTE — PROGRESS NOTES
[facility] INTERNAL MEDICINE    Visit Date:  10/10/2022  Patient:  Court Search  YOB: 1965    Assessment & Plan     Ventriculomegaly: Follows with neurosurgery. Dizziness might represent a symptom of ventriculomegaly/NPH. I advised that she calls the neurosurgeon, we might need to perform MRI. Reassess afterwards. GERD: Continue Tagamet. We will continue to monitor. Depression: Continue Zoloft. Mood appears to be stable. Breast mass: Last biopsy showed benign fibrocystic changes, follows with general surgery. Will undergo alternating surveillance mammograms and ultrasound. Diagnosis Orders   1. Cerebral ventriculomegaly        2. Gastroesophageal reflux disease without esophagitis        3. Moderate episode of recurrent major depressive disorder [facility])            Chief Complaint  Ear Fullness (Flew in airplane on 9/16 , dizziness, ring in the ear , pressure behind the left ear.)    History of Present Illness   Presents to follow-up. Says that she feels dizzy, says that she feels like she might tip over when she walks. Associated with that is ringing in her left ear as well as a feeling of fullness. Denies earache, hearing loss. Says that this has been going on for the last couple weeks. She flew in an airplane on September 16, and has had the symptoms since. Denies fever, chills, chest pain, URI symptoms. She has a history of ventriculomegaly in her brain, for which she follows neurosurgery. She was supposed to get an MRI in December, but I advised that maybe that we can have the MRI done sooner to assess for this new symptom. I advised that she call her neurosurgeon and perhaps have the MRI done sooner. Objective  /72 (Site: Left Upper Arm, Position: Sitting, Cuff Size: Medium Adult)   Pulse 81   Resp 16   Ht 5' 4\" (1.626 m)   Wt 171 lb (77.6 kg)   SpO2 96%   BMI 29.35 kg/m²   Constitutional: No acute distress.   Sits in chair comfortably  Eyes: Sclerae nonicteric. No lid lag or proptosis  HENT: External ears normal. No external lesions on the nose  Neck: No gross masses. Trachea visibly midline  Respiratory: Good air entry bilaterally. No wheezing or crackles  Cardiovascular: Normal S1-S2. No murmurs. No lower extremity edema  Gastrointestinal: No visible masses. No visible hernias  Skin: No abnormal rashes. No abnormal masses  Neurologic: Cranial nerves grossly intact  Psychiatric: Normal affect.  Alert and oriented    Medications  Current Outpatient Medications:     cimetidine (TAGAMET) 200 MG tablet, TAKE 1 TABLET BY MOUTH TWO TIMES A DAY AS NEEDED FOR HEARTBURN, Disp: 180 tablet, Rfl: 0    sertraline (ZOLOFT) 100 MG tablet, Take 1 tablet by mouth daily, Disp: 90 tablet, Rfl: 3    cetirizine (ZYRTEC) 10 MG tablet, Take 10 mg by mouth daily, Disp: , Rfl:     Multiple Vitamins-Minerals (MULTIVITAL PO), Take 1 tablet by mouth daily as needed , Disp: , Rfl:     Cholecalciferol (VITAMIN D) 2000 UNITS CAPS capsule, Take 2,000 Units by mouth daily , Disp: , Rfl:     acetaminophen (TYLENOL) 500 MG tablet, Take 500 mg by mouth every 6 hours as needed for Pain  (Patient not taking: No sig reported), Disp: , Rfl:     Allergies  Doxycycline, Aspirin, and Penicillins    Past Medical History:   Diagnosis Date    Allergic rhinitis     Anxiety     Originally 1990's, with panic    Chronic tension headaches     occasional    Depression     Zoloft started 1990's    DUB (dysfunctional uterine bleeding)     ablation    Fatigue     GERD (gastroesophageal reflux disease)     No prior scope as of PE 5/9/16- mild sx over yrs managed without meds    Hepatitis A 1969    age 8    Internal hemorrhoids     on scope 2014    Lateral epicondylitis 3/11/2014    resolved    Low back pain     YOJANA on CPAP     sleep study 12/16 CPAP 12    Thyroid nodule     u/s 11/16 stable sub cm     Trauma     Uterine fibroid      Past Surgical History:   Procedure Laterality Date BREAST BIOPSY Right     right stereotactic biopsy    BREAST BIOPSY Left     stereotactic biopsy     SECTION      COLONOSCOPY  2014    Zoë Leahy    DILATION AND CURETTAGE  11 and 2008    Dr AUDI Dale UTERUS  13    ENDOMETRIAL ABLATION  13    EXCISION LESION HAND / FINGER Right 2019    Excision Right Wrist ganglion cyst performed by Vito Julian MD at [address]  13    Dr Ceci Broussard    HYSTEROSCOPY  13    MRI BREAST BX USING DEVICE RIGHT Right 3/1/2021    MRI BREAST BX USING DEVICE RIGHT 3/1/2021 [facility]    US BREAST NEEDLE BIOPSY RIGHT Right 2021    US BREAST NEEDLE BIOPSY RIGHT 2021 [id number] ULTRASOUND    WISDOM TOOTH EXTRACTION       Family History  This patient's family history includes Asthma in her father; Breast Cancer in her maternal aunt and other family members; Breast Cancer (age of onset: 48) in her maternal grandmother; Breast Cancer (age of onset: 61) in her mother; Cancer in her mother; Endometrial Cancer (age of onset: 61) in her sister; Heart Disease in her paternal grandmother; Hypertension in her father and mother; Tamela Bernheim in her maternal grandfather; Zelpha Lisette / [place] in her sister and sister; No Known Problems in her maternal aunt and paternal aunt; Other in her mother and paternal grandfather; Pacemaker in her father; Stroke in her father. Social History  Elan Higgins  reports that she has never smoked. She has never used smokeless tobacco. She reports current alcohol use. She reports that she does not use drugs. Discussed use, benefit, and side effects of prescribed medications. All questions answered. Patient voiced understanding. Reviewed health maintenance. Electronically signed Mariusz Bateman MD on 10/10/2022 at 6:35 PM    This note has been created using the Epic electronic health record, and dictated in part by [employer] [facility] dictation system.  Despite the documenting physician's best efforts, there may be errors in spelling, grammar or syntax.

## 2022-11-01 ENCOUNTER — HOSPITAL ENCOUNTER (OUTPATIENT)
Dept: MRI IMAGING | Age: 57
Discharge: HOME | End: 2022-11-03
Payer: COMMERCIAL

## 2022-11-01 DIAGNOSIS — G93.89 CEREBRAL VENTRICULOMEGALY: ICD-10-CM

## 2022-11-01 PROCEDURE — 70551 MRI BRAIN STEM W/O DYE: CPT

## 2022-11-09 ENCOUNTER — TELEPHONE (OUTPATIENT)
Dept: INTERNAL MEDICINE | Age: 57
End: 2022-11-09

## 2022-11-09 NOTE — TELEPHONE ENCOUNTER
Dr Juan Pelayo office called. You sent a referral to them but the patient will need a hearing test before they schedule appointment in their office.   Please schedule the hearing test and have results faxed to Dr Rodrigo Ortiz

## 2022-11-10 NOTE — TELEPHONE ENCOUNTER
It is not really for hearing loss, can we call the doctor's office and let them know that it is for balance issues.

## 2022-12-30 ENCOUNTER — HOSPITAL ENCOUNTER (OUTPATIENT)
Age: 57
Discharge: HOME | End: 2022-12-30
Payer: COMMERCIAL

## 2022-12-30 ENCOUNTER — HOSPITAL ENCOUNTER (OUTPATIENT)
Dept: MRI IMAGING | Age: 57
Discharge: HOME | End: 2022-12-30
Payer: COMMERCIAL

## 2022-12-30 DIAGNOSIS — R92.8 ABNORMAL MRI, BREAST: ICD-10-CM

## 2022-12-30 DIAGNOSIS — E55.9 VITAMIN D DEFICIENCY: ICD-10-CM

## 2022-12-30 DIAGNOSIS — Z00.00 PERIODIC HEALTH ASSESSMENT, GENERAL SCREENING, ADULT: ICD-10-CM

## 2022-12-30 LAB
ABSOLUTE EOS #: 0.25 K/UL (ref 0–0.44)
ABSOLUTE IMMATURE GRANULOCYTE: <0.03 K/UL (ref 0–0.3)
ABSOLUTE LYMPH #: 1.35 K/UL (ref 1.1–3.7)
ABSOLUTE MONO #: 0.46 K/UL (ref 0.1–1.2)
ALBUMIN SERPL-MCNC: 4.5 G/DL (ref 3.5–5.2)
ALBUMIN/GLOBULIN RATIO: 1.6 (ref 1–2.5)
ALP BLD-CCNC: 83 U/L (ref 35–104)
ALT SERPL-CCNC: 15 U/L (ref 5–33)
ANION GAP SERPL CALCULATED.3IONS-SCNC: 9 MMOL/L (ref 9–17)
AST SERPL-CCNC: 17 U/L
BASOPHILS # BLD: 1 % (ref 0–2)
BASOPHILS ABSOLUTE: 0.04 K/UL (ref 0–0.2)
BILIRUB SERPL-MCNC: 0.3 MG/DL (ref 0.3–1.2)
BUN BLDV-MCNC: 14 MG/DL (ref 6–20)
BUN/CREAT BLD: 16 (ref 9–20)
CALCIUM SERPL-MCNC: 10.2 MG/DL (ref 8.6–10.4)
CHLORIDE BLD-SCNC: 106 MMOL/L (ref 98–107)
CHOLESTEROL/HDL RATIO: 2.5
CHOLESTEROL: 139 MG/DL
CO2: 30 MMOL/L (ref 20–31)
CREAT SERPL-MCNC: 0.88 MG/DL (ref 0.5–0.9)
EOSINOPHILS RELATIVE PERCENT: 5 % (ref 1–4)
GFR SERPL CREATININE-BSD FRML MDRD: >60 ML/MIN/1.73M2
GLUCOSE BLD-MCNC: 95 MG/DL (ref 70–99)
HCT VFR BLD CALC: 41 % (ref 36.3–47.1)
HDLC SERPL-MCNC: 55 MG/DL
HEMOGLOBIN: 13.3 G/DL (ref 11.9–15.1)
IMMATURE GRANULOCYTES: 0 %
LDL CHOLESTEROL: 45 MG/DL (ref 0–130)
LYMPHOCYTES # BLD: 27 % (ref 24–43)
MCH RBC QN AUTO: 28.7 PG (ref 25.2–33.5)
MCHC RBC AUTO-ENTMCNC: 32.4 G/DL (ref 25.2–33.5)
MCV RBC AUTO: 88.4 FL (ref 82.6–102.9)
MONOCYTES # BLD: 9 % (ref 3–12)
NRBC AUTOMATED: 0 PER 100 WBC
PDW BLD-RTO: 13.2 % (ref 11.8–14.4)
PLATELET # BLD: 203 K/UL (ref 138–453)
PMV BLD AUTO: 9.2 FL (ref 8.1–13.5)
POTASSIUM SERPL-SCNC: 4.3 MMOL/L (ref 3.7–5.3)
RBC # BLD: 4.64 M/UL (ref 3.95–5.11)
SEG NEUTROPHILS: 58 % (ref 36–65)
SEGMENTED NEUTROPHILS ABSOLUTE COUNT: 2.97 K/UL (ref 1.5–8.1)
SODIUM BLD-SCNC: 145 MMOL/L (ref 135–144)
TOTAL PROTEIN: 7.4 G/DL (ref 6.4–8.3)
TRIGL SERPL-MCNC: 196 MG/DL
VITAMIN D 25-HYDROXY: 42 NG/ML
WBC # BLD: 5.1 K/UL (ref 3.5–11.3)

## 2022-12-30 PROCEDURE — 80061 LIPID PANEL: CPT

## 2022-12-30 PROCEDURE — 36415 COLL VENOUS BLD VENIPUNCTURE: CPT

## 2022-12-30 PROCEDURE — 6360000004 HC RX CONTRAST MEDICATION: Performed by: SURGERY

## 2022-12-30 PROCEDURE — 85025 COMPLETE CBC W/AUTO DIFF WBC: CPT

## 2022-12-30 PROCEDURE — 2709999900 MRI BREAST BILATERAL W WO CONTRAST

## 2022-12-30 PROCEDURE — A9579 GAD-BASE MR CONTRAST NOS,1ML: HCPCS | Performed by: SURGERY

## 2022-12-30 PROCEDURE — 82306 VITAMIN D 25 HYDROXY: CPT

## 2022-12-30 PROCEDURE — 80053 COMPREHEN METABOLIC PANEL: CPT

## 2022-12-30 RX ADMIN — GADOTERIDOL 20 ML: 279.3 INJECTION, SOLUTION INTRAVENOUS at 11:12

## 2023-01-10 PROBLEM — R26.89 LOSS OF BALANCE: Status: ACTIVE | Noted: 2022-11-21

## 2023-01-10 PROBLEM — R42 DIZZINESS: Status: ACTIVE | Noted: 2022-11-21

## 2023-01-17 ENCOUNTER — OFFICE VISIT (OUTPATIENT)
Dept: INTERNAL MEDICINE | Age: 58
End: 2023-01-17
Payer: COMMERCIAL

## 2023-01-17 VITALS
DIASTOLIC BLOOD PRESSURE: 85 MMHG | HEART RATE: 87 BPM | BODY MASS INDEX: 29.02 KG/M2 | RESPIRATION RATE: 16 BRPM | HEIGHT: 64 IN | SYSTOLIC BLOOD PRESSURE: 135 MMHG | OXYGEN SATURATION: 98 % | WEIGHT: 170 LBS

## 2023-01-17 DIAGNOSIS — F33.1 MODERATE EPISODE OF RECURRENT MAJOR DEPRESSIVE DISORDER (HCC): ICD-10-CM

## 2023-01-17 DIAGNOSIS — G93.89 CEREBRAL VENTRICULOMEGALY: ICD-10-CM

## 2023-01-17 DIAGNOSIS — K21.9 GASTROESOPHAGEAL REFLUX DISEASE WITHOUT ESOPHAGITIS: Primary | ICD-10-CM

## 2023-01-17 DIAGNOSIS — G91.2 NPH (NORMAL PRESSURE HYDROCEPHALUS) (HCC): ICD-10-CM

## 2023-01-17 DIAGNOSIS — N63.0 MASS OF BREAST, UNSPECIFIED LATERALITY: ICD-10-CM

## 2023-01-17 PROCEDURE — G8427 DOCREV CUR MEDS BY ELIG CLIN: HCPCS | Performed by: INTERNAL MEDICINE

## 2023-01-17 PROCEDURE — 1036F TOBACCO NON-USER: CPT | Performed by: INTERNAL MEDICINE

## 2023-01-17 PROCEDURE — 3017F COLORECTAL CA SCREEN DOC REV: CPT | Performed by: INTERNAL MEDICINE

## 2023-01-17 PROCEDURE — G8484 FLU IMMUNIZE NO ADMIN: HCPCS | Performed by: INTERNAL MEDICINE

## 2023-01-17 PROCEDURE — G8417 CALC BMI ABV UP PARAM F/U: HCPCS | Performed by: INTERNAL MEDICINE

## 2023-01-17 PROCEDURE — 99214 OFFICE O/P EST MOD 30 MIN: CPT | Performed by: INTERNAL MEDICINE

## 2023-01-17 SDOH — ECONOMIC STABILITY: FOOD INSECURITY: WITHIN THE PAST 12 MONTHS, YOU WORRIED THAT YOUR FOOD WOULD RUN OUT BEFORE YOU GOT MONEY TO BUY MORE.: NEVER TRUE

## 2023-01-17 SDOH — ECONOMIC STABILITY: FOOD INSECURITY: WITHIN THE PAST 12 MONTHS, THE FOOD YOU BOUGHT JUST DIDN'T LAST AND YOU DIDN'T HAVE MONEY TO GET MORE.: NEVER TRUE

## 2023-01-17 ASSESSMENT — PATIENT HEALTH QUESTIONNAIRE - PHQ9
SUM OF ALL RESPONSES TO PHQ QUESTIONS 1-9: 0
SUM OF ALL RESPONSES TO PHQ QUESTIONS 1-9: 0
3. TROUBLE FALLING OR STAYING ASLEEP: 0
6. FEELING BAD ABOUT YOURSELF - OR THAT YOU ARE A FAILURE OR HAVE LET YOURSELF OR YOUR FAMILY DOWN: 0
SUM OF ALL RESPONSES TO PHQ9 QUESTIONS 1 & 2: 0
1. LITTLE INTEREST OR PLEASURE IN DOING THINGS: 0
8. MOVING OR SPEAKING SO SLOWLY THAT OTHER PEOPLE COULD HAVE NOTICED. OR THE OPPOSITE, BEING SO FIGETY OR RESTLESS THAT YOU HAVE BEEN MOVING AROUND A LOT MORE THAN USUAL: 0
SUM OF ALL RESPONSES TO PHQ QUESTIONS 1-9: 0
5. POOR APPETITE OR OVEREATING: 0
10. IF YOU CHECKED OFF ANY PROBLEMS, HOW DIFFICULT HAVE THESE PROBLEMS MADE IT FOR YOU TO DO YOUR WORK, TAKE CARE OF THINGS AT HOME, OR GET ALONG WITH OTHER PEOPLE: 0
SUM OF ALL RESPONSES TO PHQ QUESTIONS 1-9: 0
4. FEELING TIRED OR HAVING LITTLE ENERGY: 0
2. FEELING DOWN, DEPRESSED OR HOPELESS: 0
9. THOUGHTS THAT YOU WOULD BE BETTER OFF DEAD, OR OF HURTING YOURSELF: 0
7. TROUBLE CONCENTRATING ON THINGS, SUCH AS READING THE NEWSPAPER OR WATCHING TELEVISION: 0

## 2023-01-17 ASSESSMENT — SOCIAL DETERMINANTS OF HEALTH (SDOH): HOW HARD IS IT FOR YOU TO PAY FOR THE VERY BASICS LIKE FOOD, HOUSING, MEDICAL CARE, AND HEATING?: NOT VERY HARD

## 2023-01-18 NOTE — PROGRESS NOTES
[facility]    Visit Date:  1/17/2023  Patient:  Deb Argueta  YOB: 1965    Assessment & Plan     Ventriculomegaly: Follows with neurosurgery. We will continue to monitor. GERD: Continue Tagamet. We will continue to monitor. Depression: Continue Zoloft. Mood appears to be stable. Breast mass: Last biopsy showed benign fibrocystic changes, follows with general surgery. Will undergo alternating surveillance mammograms and ultrasound. Diagnosis Orders   1. Gastroesophageal reflux disease without esophagitis  CBC with Auto Differential    Comprehensive Metabolic Panel    Lipid Panel      2. Cerebral ventriculomegaly  CBC with Auto Differential    Comprehensive Metabolic Panel    Lipid Panel      3. Mass of breast, unspecified laterality        4. Moderate episode of recurrent major depressive disorder ([address]        5. NPH (normal pressure hydrocephalus) ([facility])            Chief Complaint  Post-COVID Symptoms    History of Present Illness   Presents to follow-up. Says that she feels fine at this time. She has a history of ventriculomegaly, and had experienced dizziness last visit, which she said had subsided. She follows with neurosurgery, who discussed the possibility of a  shunt. She says that she will continue to follow-up with neurosurgeon. Has a history of GERD, depression that are unchanged    Objective  /85 (Site: Left Upper Arm, Position: Sitting, Cuff Size: Medium Adult)   Pulse 87   Resp 16   Ht 5' 4\" (1.626 m)   Wt 170 lb (77.1 kg)   SpO2 98%   BMI 29.18 kg/m²   Constitutional: No acute distress. Sits in chair comfortably  Eyes: Sclerae nonicteric. No lid lag or proptosis  HENT: External ears normal. No external lesions on the nose  Neck: No gross masses. Trachea visibly midline  Respiratory: Good air entry bilaterally. No wheezing or crackles  Cardiovascular: Normal S1-S2. No murmurs.   No lower extremity edema  Gastrointestinal: No visible masses. No visible hernias  Skin: No abnormal rashes. No abnormal masses  Neurologic: Cranial nerves grossly intact  Psychiatric: Normal affect.  Alert and oriented    Medications  Current Outpatient Medications:     cimetidine (TAGAMET) 200 MG tablet, TAKE 1 TABLET BY MOUTH TWO TIMES A DAY AS NEEDED FOR HEARTBURN, Disp: 180 tablet, Rfl: 0    sertraline (ZOLOFT) 100 MG tablet, Take 1 tablet by mouth daily, Disp: 90 tablet, Rfl: 3    cetirizine (ZYRTEC) 10 MG tablet, Take 10 mg by mouth daily, Disp: , Rfl:     Multiple Vitamins-Minerals (MULTIVITAL PO), Take 1 tablet by mouth daily as needed , Disp: , Rfl:     Cholecalciferol (VITAMIN D) 2000 UNITS CAPS capsule, Take 2,000 Units by mouth daily , Disp: , Rfl:     acetaminophen (TYLENOL) 500 MG tablet, Take 500 mg by mouth every 6 hours as needed for Pain  (Patient not taking: No sig reported), Disp: , Rfl:     Allergies  Doxycycline, Aspirin, and Penicillins    Past Medical History:   Diagnosis Date    Allergic rhinitis     Anxiety     Originally , with panic    Chronic tension headaches     occasional    Depression     Zoloft started     DUB (dysfunctional uterine bleeding)     ablation    Fatigue     GERD (gastroesophageal reflux disease)     No prior scope as of PE 16- mild sx over yrs managed without meds    Hepatitis A 1969    age 8    Internal hemorrhoids     on scope     Lateral epicondylitis 3/11/2014    resolved    Low back pain     YOJANA on CPAP     sleep study  CPAP 12    Thyroid nodule     u/s  stable sub cm     Trauma     Uterine fibroid      Past Surgical History:   Procedure Laterality Date    BREAST BIOPSY Right     right stereotactic biopsy    BREAST BIOPSY Left     stereotactic biopsy     SECTION      COLONOSCOPY  2014    Zoë Leahy    DILATION AND CURETTAGE  13 and 2008    Dr Elena Clark  13    ENDOMETRIAL ABLATION 12/17/13    EXCISION LESION HAND / FINGER Right 5/21/2019    Excision Right Wrist ganglion cyst performed by Charlie Mcadams MD at [address]  7/2/13    Dr Sierra Brizuela    HYSTEROSCOPY  12/17/13    MRI BREAST BX USING DEVICE RIGHT Right 3/1/2021    MRI BREAST BX USING DEVICE RIGHT 3/1/2021 [facility]    US BREAST BIOPSY W LOC DEVICE 1ST LESION RIGHT Right 1/29/2021    US BREAST NEEDLE BIOPSY RIGHT 1/29/2021 [facility] ULTRASOUND    WISDOM TOOTH EXTRACTION       Family History  This patient's family history includes Asthma in her father; Breast Cancer in her maternal aunt and other family members; Breast Cancer (age of onset: 48) in her maternal grandmother; Breast Cancer (age of onset: 61) in her mother; Cancer in her mother; Endometrial Cancer (age of onset: 61) in her sister; Heart Disease in her paternal grandmother; Hypertension in her father and mother; Miracle Hermanns in her maternal grandfather; Brendia Herrera / [place] in her sister and sister; No Known Problems in her maternal aunt and paternal aunt; Other in her mother and paternal grandfather; Pacemaker in her father; Stroke in her father. Social History  Rafael Montgomery  reports that she has never smoked. She has never used smokeless tobacco. She reports current alcohol use. She reports that she does not use drugs. Discussed use, benefit, and side effects of prescribed medications. All questions answered. Patient voiced understanding. Reviewed health maintenance. Electronically signed Jonnie France MD on 1/17/2023 at 12:03 PM    This note has been created using the Epic electronic health record, and dictated in part by [employer] dictation system. Despite the documenting physician's best efforts, there may be errors in spelling, grammar or syntax.

## 2023-03-20 RX ORDER — SERTRALINE HYDROCHLORIDE 100 MG/1
TABLET, FILM COATED ORAL
Qty: 90 TABLET | Refills: 3 | Status: SHIPPED | OUTPATIENT
Start: 2023-03-20

## 2023-04-25 ENCOUNTER — OFFICE VISIT (OUTPATIENT)
Dept: OBGYN | Age: 58
End: 2023-04-25
Payer: COMMERCIAL

## 2023-04-25 ENCOUNTER — HOSPITAL ENCOUNTER (OUTPATIENT)
Age: 58
Setting detail: SPECIMEN
Discharge: HOME | End: 2023-04-25
Payer: COMMERCIAL

## 2023-04-25 VITALS
WEIGHT: 177.2 LBS | DIASTOLIC BLOOD PRESSURE: 64 MMHG | HEIGHT: 64 IN | OXYGEN SATURATION: 98 % | HEART RATE: 87 BPM | SYSTOLIC BLOOD PRESSURE: 120 MMHG | BODY MASS INDEX: 30.25 KG/M2

## 2023-04-25 DIAGNOSIS — Z11.3 ROUTINE SCREENING FOR STI (SEXUALLY TRANSMITTED INFECTION): ICD-10-CM

## 2023-04-25 DIAGNOSIS — Z78.0 MENOPAUSE: ICD-10-CM

## 2023-04-25 DIAGNOSIS — Z12.31 ENCOUNTER FOR SCREENING MAMMOGRAM FOR MALIGNANT NEOPLASM OF BREAST: ICD-10-CM

## 2023-04-25 DIAGNOSIS — Z12.4 SCREENING FOR MALIGNANT NEOPLASM OF CERVIX: Primary | ICD-10-CM

## 2023-04-25 DIAGNOSIS — Z12.4 SCREENING FOR MALIGNANT NEOPLASM OF CERVIX: ICD-10-CM

## 2023-04-25 DIAGNOSIS — N89.8 VAGINAL DISCHARGE: ICD-10-CM

## 2023-04-25 PROCEDURE — 99396 PREV VISIT EST AGE 40-64: CPT | Performed by: NURSE PRACTITIONER

## 2023-04-25 PROCEDURE — G0123 SCREEN CERV/VAG THIN LAYER: HCPCS

## 2023-04-25 PROCEDURE — 87624 HPV HI-RISK TYP POOLED RSLT: CPT

## 2023-04-25 SDOH — ECONOMIC STABILITY: FOOD INSECURITY: WITHIN THE PAST 12 MONTHS, YOU WORRIED THAT YOUR FOOD WOULD RUN OUT BEFORE YOU GOT MONEY TO BUY MORE.: NEVER TRUE

## 2023-04-25 SDOH — ECONOMIC STABILITY: INCOME INSECURITY: HOW HARD IS IT FOR YOU TO PAY FOR THE VERY BASICS LIKE FOOD, HOUSING, MEDICAL CARE, AND HEATING?: NOT HARD AT ALL

## 2023-04-25 SDOH — ECONOMIC STABILITY: FOOD INSECURITY: WITHIN THE PAST 12 MONTHS, THE FOOD YOU BOUGHT JUST DIDN'T LAST AND YOU DIDN'T HAVE MONEY TO GET MORE.: NEVER TRUE

## 2023-04-25 SDOH — ECONOMIC STABILITY: HOUSING INSECURITY
IN THE LAST 12 MONTHS, WAS THERE A TIME WHEN YOU DID NOT HAVE A STEADY PLACE TO SLEEP OR SLEPT IN A SHELTER (INCLUDING NOW)?: NO

## 2023-04-25 ASSESSMENT — ENCOUNTER SYMPTOMS
GASTROINTESTINAL NEGATIVE: 1
RESPIRATORY NEGATIVE: 1
EYES NEGATIVE: 1
ALLERGIC/IMMUNOLOGIC NEGATIVE: 1

## 2023-04-25 ASSESSMENT — PATIENT HEALTH QUESTIONNAIRE - PHQ9
SUM OF ALL RESPONSES TO PHQ QUESTIONS 1-9: 0
1. LITTLE INTEREST OR PLEASURE IN DOING THINGS: 0
2. FEELING DOWN, DEPRESSED OR HOPELESS: 0
SUM OF ALL RESPONSES TO PHQ QUESTIONS 1-9: 0
SUM OF ALL RESPONSES TO PHQ9 QUESTIONS 1 & 2: 0

## 2023-04-25 NOTE — PROGRESS NOTES
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 2023. I spent 30 min. With pt. Discussing medical status and management options  RTO 1 yr. prn    No follow-ups on file. Orders Placed This Encounter   Procedures    Vaginitis DNA Probe     Standing Status:   Future     Standing Expiration Date:   10/25/2023    C.trachomatis N.gonorrhoeae DNA     Standing Status:   Future     Standing Expiration Date:   10/25/2023    KARINA CYNTHIA DIGITAL SCREEN BILATERAL     Standing Status:   Future     Standing Expiration Date:   2024    DEXA BONE DENSITY AXIAL SKELETON     Standing Status:   Future     Standing Expiration Date:   2024     Order Specific Question:   Reason for exam:     Answer:   osteopenia    PAP SMEAR     Patient History:    No LMP recorded. Patient has had an ablation. OBGYN Status: Ablation  Past Surgical History:  No date: BREAST BIOPSY; Right      Comment:  right stereotactic biopsy  No date: BREAST BIOPSY; Left      Comment:  stereotactic biopsy  No date:  SECTION  2014: COLONOSCOPY      Comment:  Nette Arizmendi  11 and 2008: DILATION AND CURETTAGE      Comment:  Dr Aba Monroe  2013: DILATION AND CURETTAGE OF UTERUS  2013: ENDOMETRIAL ABLATION  2019: EXCISION LESION HAND / FINGER; Right      Comment:  Excision Right Wrist ganglion cyst performed by Bettye Winn MD at [facility] OR  2013: HYSTEROSCOPY      Comment:  Dr Aba Monroe  2013: HYSTEROSCOPY  2021: MRI BREAST BX USING DEVICE RIGHT; Right      Comment:  MRI BREAST BX USING DEVICE RIGHT 3/1/2021 [facility]  No date: TUBAL LIGATION  2021: US BREAST BIOPSY W LOC DEVICE 1ST LESION RIGHT;  Right      Comment:

## 2023-05-01 LAB — CYTOLOGY REPORT: NORMAL

## 2023-06-27 ENCOUNTER — HOSPITAL ENCOUNTER (OUTPATIENT)
Dept: MAMMOGRAPHY | Age: 58
Discharge: HOME | End: 2023-06-29
Payer: COMMERCIAL

## 2023-06-27 ENCOUNTER — HOSPITAL ENCOUNTER (OUTPATIENT)
Dept: BONE DENSITY | Age: 58
Discharge: HOME | End: 2023-06-29
Payer: COMMERCIAL

## 2023-06-27 DIAGNOSIS — Z12.31 ENCOUNTER FOR SCREENING MAMMOGRAM FOR MALIGNANT NEOPLASM OF BREAST: ICD-10-CM

## 2023-06-27 DIAGNOSIS — Z78.0 MENOPAUSE: ICD-10-CM

## 2023-06-27 PROCEDURE — 77080 DXA BONE DENSITY AXIAL: CPT

## 2023-06-27 PROCEDURE — 77063 BREAST TOMOSYNTHESIS BI: CPT

## 2023-07-09 DIAGNOSIS — R92.8 ABNORMAL MAMMOGRAM: Primary | ICD-10-CM

## 2023-07-12 ENCOUNTER — HOSPITAL ENCOUNTER (OUTPATIENT)
Dept: MAMMOGRAPHY | Age: 58
Discharge: HOME | End: 2023-07-14
Payer: COMMERCIAL

## 2023-07-12 DIAGNOSIS — R92.8 ABNORMAL MAMMOGRAM: ICD-10-CM

## 2023-07-12 PROCEDURE — G0279 TOMOSYNTHESIS, MAMMO: HCPCS

## 2023-07-17 ENCOUNTER — PATIENT MESSAGE (OUTPATIENT)
Dept: OBGYN | Age: 58
End: 2023-07-17

## 2023-07-17 DIAGNOSIS — R92.8 ABNORMAL MAMMOGRAM OF LEFT BREAST: Primary | ICD-10-CM

## 2023-07-19 NOTE — TELEPHONE ENCOUNTER
From: Brayton Gosselin  To: Calista Sequeira  Sent: 7/17/2023 10:53 AM EDT  Subject: Breast MRI    Can your office or should Dr. Nehal Alberto office put in the orders for my breast MRI for December, 2023? I wasn't able to make the appointment since no one has ordered it for this year. Also, my second, magnified breast MRI showed \"probably benign\" findings. Repeat in January, 2024. Do I have rights as a patient to get that scan BEFORE 6 months?     Thanks,    Evelyne Jimenez

## 2023-07-22 ENCOUNTER — HOSPITAL ENCOUNTER (OUTPATIENT)
Age: 58
Discharge: HOME | End: 2023-07-22
Payer: COMMERCIAL

## 2023-07-22 DIAGNOSIS — K21.9 GASTROESOPHAGEAL REFLUX DISEASE WITHOUT ESOPHAGITIS: ICD-10-CM

## 2023-07-22 DIAGNOSIS — G93.89 CEREBRAL VENTRICULOMEGALY: ICD-10-CM

## 2023-07-22 LAB
ALBUMIN SERPL-MCNC: 4.6 G/DL (ref 3.5–5.2)
ALBUMIN/GLOB SERPL: 1.8 {RATIO} (ref 1–2.5)
ALP SERPL-CCNC: 88 U/L (ref 35–104)
ALT SERPL-CCNC: 17 U/L (ref 5–33)
ANION GAP SERPL CALCULATED.3IONS-SCNC: 9 MMOL/L (ref 9–17)
AST SERPL-CCNC: 17 U/L
BASOPHILS # BLD: 0.05 K/UL (ref 0–0.2)
BASOPHILS NFR BLD: 1 % (ref 0–2)
BILIRUB SERPL-MCNC: 0.4 MG/DL (ref 0.3–1.2)
BUN SERPL-MCNC: 13 MG/DL (ref 8–23)
BUN/CREAT SERPL: 16 (ref 9–20)
CALCIUM SERPL-MCNC: 9.8 MG/DL (ref 8.6–10.4)
CHLORIDE SERPL-SCNC: 105 MMOL/L (ref 98–107)
CHOLEST SERPL-MCNC: 125 MG/DL
CHOLESTEROL/HDL RATIO: 2.3
CO2 SERPL-SCNC: 29 MMOL/L (ref 20–31)
CREAT SERPL-MCNC: 0.8 MG/DL (ref 0.5–0.9)
EOSINOPHIL # BLD: 0.35 K/UL (ref 0–0.44)
EOSINOPHILS RELATIVE PERCENT: 8 % (ref 1–4)
ERYTHROCYTE [DISTWIDTH] IN BLOOD BY AUTOMATED COUNT: 13.3 % (ref 11.8–14.4)
GFR SERPL CREATININE-BSD FRML MDRD: >60 ML/MIN/1.73M2
GLUCOSE SERPL-MCNC: 97 MG/DL (ref 70–99)
HCT VFR BLD AUTO: 38.4 % (ref 36.3–47.1)
HDLC SERPL-MCNC: 54 MG/DL
HGB BLD-MCNC: 12.8 G/DL (ref 11.9–15.1)
IMM GRANULOCYTES # BLD AUTO: <0.03 K/UL (ref 0–0.3)
IMM GRANULOCYTES NFR BLD: 0 %
LDLC SERPL CALC-MCNC: 39 MG/DL (ref 0–130)
LYMPHOCYTES NFR BLD: 1.19 K/UL (ref 1.1–3.7)
LYMPHOCYTES RELATIVE PERCENT: 26 % (ref 24–43)
MCH RBC QN AUTO: 29 PG (ref 25.2–33.5)
MCHC RBC AUTO-ENTMCNC: 33.3 G/DL (ref 25.2–33.5)
MCV RBC AUTO: 87.1 FL (ref 82.6–102.9)
MONOCYTES NFR BLD: 0.37 K/UL (ref 0.1–1.2)
MONOCYTES NFR BLD: 8 % (ref 3–12)
NEUTROPHILS NFR BLD: 57 % (ref 36–65)
NEUTS SEG NFR BLD: 2.63 K/UL (ref 1.5–8.1)
NRBC BLD-RTO: 0 PER 100 WBC
PLATELET # BLD AUTO: 222 K/UL (ref 138–453)
PMV BLD AUTO: 9.3 FL (ref 8.1–13.5)
POTASSIUM SERPL-SCNC: 4.3 MMOL/L (ref 3.7–5.3)
PROT SERPL-MCNC: 7.2 G/DL (ref 6.4–8.3)
RBC # BLD AUTO: 4.41 M/UL (ref 3.95–5.11)
SODIUM SERPL-SCNC: 143 MMOL/L (ref 135–144)
TRIGL SERPL-MCNC: 159 MG/DL
WBC OTHER # BLD: 4.6 K/UL (ref 3.5–11.3)

## 2023-07-22 PROCEDURE — 80061 LIPID PANEL: CPT

## 2023-07-22 PROCEDURE — 80053 COMPREHEN METABOLIC PANEL: CPT

## 2023-07-22 PROCEDURE — 85027 COMPLETE CBC AUTOMATED: CPT

## 2023-07-22 PROCEDURE — 36415 COLL VENOUS BLD VENIPUNCTURE: CPT

## 2023-07-24 ENCOUNTER — OFFICE VISIT (OUTPATIENT)
Dept: INTERNAL MEDICINE | Age: 58
End: 2023-07-24

## 2023-07-24 VITALS
WEIGHT: 175 LBS | HEART RATE: 80 BPM | BODY MASS INDEX: 29.88 KG/M2 | RESPIRATION RATE: 16 BRPM | HEIGHT: 64 IN | SYSTOLIC BLOOD PRESSURE: 124 MMHG | OXYGEN SATURATION: 97 % | DIASTOLIC BLOOD PRESSURE: 60 MMHG

## 2023-07-24 DIAGNOSIS — K21.9 GASTROESOPHAGEAL REFLUX DISEASE WITHOUT ESOPHAGITIS: ICD-10-CM

## 2023-07-24 DIAGNOSIS — Z00.00 PERIODIC HEALTH ASSESSMENT, GENERAL SCREENING, ADULT: ICD-10-CM

## 2023-07-24 DIAGNOSIS — N63.0 MASS OF BREAST, UNSPECIFIED LATERALITY: Primary | ICD-10-CM

## 2023-07-24 DIAGNOSIS — E55.9 VITAMIN D DEFICIENCY: ICD-10-CM

## 2023-07-31 DIAGNOSIS — Z12.31 ENCOUNTER FOR SCREENING MAMMOGRAM FOR MALIGNANT NEOPLASM OF BREAST: Primary | ICD-10-CM

## 2023-09-06 NOTE — PROGRESS NOTES
Subjective:      Patient ID: Dang Henderson is a 59 y.o. female. HPI  Patient here for follow-up for YOJANA on CPAP. Last seen in office per Dr. Christina Groves in September 2022    Compliance report reviewed from 8/27/2023 through 9/25/2023, 29 out of 30 days used, all 29 days for more than 4 hours. Residual AHI is 2.5. Average pressure is 8.7 cm H2O. Patient is on auto titrating CPAP 8-16 cm H2O. 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 Medications:   No pulmonary meds      Smoking status:  Lifelong non-smoker    PRIOR WORKUP:  PFT:  None on chart    CT Imaging:  None on chart    Sleep Study:  Titration study  12/7/2016: Patient was titrated to a final pressure of 12 cm H2O. Sleep study completed 11/1/2016: Patient with a total of 3 obstructive apneas and 120 hypopneas. Her apnea hypopnea index for the entire night was 17.8. AHI during NREM sleep was 15.2 and AHI during REM sleep was 28.7. She had a total of 51 leg movements for a PLM index of 7.4 events per hour.   No arousals were noted with leg movements    Laboratory Evaluation:      Immunizations:   Immunization History   Administered Date(s) Administered    COVID-19, MODERNA GIRMA border, Primary or Immunocompromised, (age 12y+), IM, 100 mcg/0.5mL 03/13/2021, 06/29/2021, 01/14/2022    Rabies 09/01/2018, 09/07/2018, 09/11/2018, 09/18/2018    TDaP, ADACEL (age 6y-58y), BOOSTRIX (age 10y+), IM, 0.5mL 01/11/2017    Td vaccine (adult) 04/09/2001    Zoster Recombinant (Shingrix) 03/08/2019, 05/10/2019            10/24/2017     3:52 PM   Sleep Medicine   Sitting and reading 1   Watching TV 1   Sitting, inactive in a public place (e.g. a theatre or a meeting) 0   As a passenger in a car for an hour without a break 2   Lying down to rest in the afternoon when circumstances permit 1   Sitting and talking to someone 0   Sitting quietly after a lunch without alcohol 0   In a car, while stopped for a few minutes in traffic 3   [place]

## 2023-09-26 ENCOUNTER — OFFICE VISIT (OUTPATIENT)
Dept: PULMONOLOGY | Age: 58
End: 2023-09-26
Payer: COMMERCIAL

## 2023-09-26 VITALS
BODY MASS INDEX: 31.54 KG/M2 | SYSTOLIC BLOOD PRESSURE: 132 MMHG | WEIGHT: 178 LBS | DIASTOLIC BLOOD PRESSURE: 74 MMHG | OXYGEN SATURATION: 96 % | HEIGHT: 63 IN | HEART RATE: 87 BPM

## 2023-09-26 DIAGNOSIS — G47.33 OSA ON CPAP: Primary | ICD-10-CM

## 2023-09-26 PROCEDURE — G8427 DOCREV CUR MEDS BY ELIG CLIN: HCPCS | Performed by: NURSE PRACTITIONER

## 2023-09-26 PROCEDURE — 99213 OFFICE O/P EST LOW 20 MIN: CPT | Performed by: NURSE PRACTITIONER

## 2023-09-26 PROCEDURE — 3017F COLORECTAL CA SCREEN DOC REV: CPT | Performed by: NURSE PRACTITIONER

## 2023-09-26 PROCEDURE — G8417 CALC BMI ABV UP PARAM F/U: HCPCS | Performed by: NURSE PRACTITIONER

## 2023-09-26 PROCEDURE — 1036F TOBACCO NON-USER: CPT | Performed by: NURSE PRACTITIONER

## 2023-09-26 ASSESSMENT — ENCOUNTER SYMPTOMS
CONSTIPATION: 0
SINUS PAIN: 0
COUGH: 0
SHORTNESS OF BREATH: 0
DIARRHEA: 0
SINUS PRESSURE: 0
STRIDOR: 0
RHINORRHEA: 0
NAUSEA: 0
WHEEZING: 0
CHEST TIGHTNESS: 0
VOMITING: 0

## 2024-01-08 ENCOUNTER — HOSPITAL ENCOUNTER (OUTPATIENT)
Age: 59
Discharge: HOME | End: 2024-01-08
Payer: COMMERCIAL

## 2024-01-08 DIAGNOSIS — K21.9 GASTROESOPHAGEAL REFLUX DISEASE WITHOUT ESOPHAGITIS: ICD-10-CM

## 2024-01-08 DIAGNOSIS — Z12.31 ENCOUNTER FOR SCREENING MAMMOGRAM FOR MALIGNANT NEOPLASM OF BREAST: ICD-10-CM

## 2024-01-08 DIAGNOSIS — Z00.00 PERIODIC HEALTH ASSESSMENT, GENERAL SCREENING, ADULT: ICD-10-CM

## 2024-01-08 DIAGNOSIS — E55.9 VITAMIN D DEFICIENCY: ICD-10-CM

## 2024-01-08 LAB
25(OH)D3 SERPL-MCNC: 35.9 NG/ML (ref 30–100)
ALBUMIN SERPL-MCNC: 4.5 G/DL (ref 3.5–5.2)
ALBUMIN/GLOB SERPL: 1.6 {RATIO} (ref 1–2.5)
ALP SERPL-CCNC: 89 U/L (ref 35–104)
ALT SERPL-CCNC: 21 U/L (ref 5–33)
ANION GAP SERPL CALCULATED.3IONS-SCNC: 10 MMOL/L (ref 9–17)
AST SERPL-CCNC: 19 U/L
BASOPHILS # BLD: 0.04 K/UL (ref 0–0.2)
BASOPHILS NFR BLD: 1 % (ref 0–2)
BILIRUB SERPL-MCNC: 0.4 MG/DL (ref 0.3–1.2)
BUN SERPL-MCNC: 12 MG/DL (ref 8–23)
BUN/CREAT SERPL: 15 (ref 9–20)
CALCIUM SERPL-MCNC: 10 MG/DL (ref 8.6–10.4)
CHLORIDE SERPL-SCNC: 103 MMOL/L (ref 98–107)
CHOLEST SERPL-MCNC: 177 MG/DL (ref 0–199)
CHOLESTEROL/HDL RATIO: 3
CO2 SERPL-SCNC: 30 MMOL/L (ref 20–31)
CREAT SERPL-MCNC: 0.8 MG/DL (ref 0.5–0.9)
CREAT SERPL-MCNC: 0.8 MG/DL (ref 0.5–0.9)
EOSINOPHIL # BLD: 0.38 K/UL (ref 0–0.44)
EOSINOPHILS RELATIVE PERCENT: 8 % (ref 1–4)
ERYTHROCYTE [DISTWIDTH] IN BLOOD BY AUTOMATED COUNT: 13.2 % (ref 11.8–14.4)
GFR SERPL CREATININE-BSD FRML MDRD: >60 ML/MIN/1.73M2
GFR SERPL CREATININE-BSD FRML MDRD: >60 ML/MIN/1.73M2
GLUCOSE SERPL-MCNC: 95 MG/DL (ref 70–99)
HCT VFR BLD AUTO: 37.4 % (ref 36.3–47.1)
HDLC SERPL-MCNC: 54 MG/DL
HGB BLD-MCNC: 12.8 G/DL (ref 11.9–15.1)
IMM GRANULOCYTES # BLD AUTO: <0.03 K/UL (ref 0–0.3)
IMM GRANULOCYTES NFR BLD: 0 %
LDLC SERPL CALC-MCNC: 70 MG/DL (ref 0–100)
LYMPHOCYTES NFR BLD: 1.29 K/UL (ref 1.1–3.7)
LYMPHOCYTES RELATIVE PERCENT: 26 % (ref 24–43)
MCH RBC QN AUTO: 29.5 PG (ref 25.2–33.5)
MCHC RBC AUTO-ENTMCNC: 34.2 G/DL (ref 25.2–33.5)
MCV RBC AUTO: 86.2 FL (ref 82.6–102.9)
MONOCYTES NFR BLD: 0.37 K/UL (ref 0.1–1.2)
MONOCYTES NFR BLD: 7 % (ref 3–12)
NEUTROPHILS NFR BLD: 58 % (ref 36–65)
NEUTS SEG NFR BLD: 2.92 K/UL (ref 1.5–8.1)
NRBC BLD-RTO: 0 PER 100 WBC
PLATELET # BLD AUTO: 219 K/UL (ref 138–453)
PMV BLD AUTO: 9 FL (ref 8.1–13.5)
POTASSIUM SERPL-SCNC: 4.6 MMOL/L (ref 3.7–5.3)
PROT SERPL-MCNC: 7.3 G/DL (ref 6.4–8.3)
RBC # BLD AUTO: 4.34 M/UL (ref 3.95–5.11)
SODIUM SERPL-SCNC: 143 MMOL/L (ref 135–144)
TRIGL SERPL-MCNC: 267 MG/DL
VLDLC SERPL CALC-MCNC: 53 MG/DL
WBC OTHER # BLD: 5 K/UL (ref 3.5–11.3)

## 2024-01-08 PROCEDURE — 80061 LIPID PANEL: CPT

## 2024-01-08 PROCEDURE — 80053 COMPREHEN METABOLIC PANEL: CPT

## 2024-01-08 PROCEDURE — 82306 VITAMIN D 25 HYDROXY: CPT

## 2024-01-08 PROCEDURE — 82565 ASSAY OF CREATININE: CPT

## 2024-01-08 PROCEDURE — 85025 COMPLETE CBC W/AUTO DIFF WBC: CPT

## 2024-01-08 PROCEDURE — 36415 COLL VENOUS BLD VENIPUNCTURE: CPT

## 2024-01-12 ENCOUNTER — HOSPITAL ENCOUNTER (OUTPATIENT)
Dept: MRI IMAGING | Age: 59
End: 2024-01-12
Payer: COMMERCIAL

## 2024-01-12 ENCOUNTER — HOSPITAL ENCOUNTER (OUTPATIENT)
Dept: MAMMOGRAPHY | Age: 59
Discharge: HOME | End: 2024-01-12
Attending: INTERNAL MEDICINE
Payer: COMMERCIAL

## 2024-01-12 DIAGNOSIS — N63.0 MASS OF BREAST, UNSPECIFIED LATERALITY: ICD-10-CM

## 2024-01-12 DIAGNOSIS — Z12.31 ENCOUNTER FOR SCREENING MAMMOGRAM FOR MALIGNANT NEOPLASM OF BREAST: ICD-10-CM

## 2024-01-12 PROCEDURE — G0279 TOMOSYNTHESIS, MAMMO: HCPCS

## 2024-01-12 PROCEDURE — 6360000004 HC RX CONTRAST MEDICATION: Performed by: NURSE PRACTITIONER

## 2024-01-12 PROCEDURE — C8908 MRI W/O FOL W/CONT, BREAST,: HCPCS

## 2024-01-12 PROCEDURE — A9579 GAD-BASE MR CONTRAST NOS,1ML: HCPCS | Performed by: NURSE PRACTITIONER

## 2024-01-12 RX ADMIN — GADOTERIDOL 20 ML: 279.3 INJECTION, SOLUTION INTRAVENOUS at 11:53

## 2024-01-15 DIAGNOSIS — Z91.89 AT HIGH RISK FOR BREAST CANCER: Primary | ICD-10-CM

## 2024-02-13 ENCOUNTER — OFFICE VISIT (OUTPATIENT)
Dept: INTERNAL MEDICINE | Age: 59
End: 2024-02-13
Payer: COMMERCIAL

## 2024-02-13 VITALS
HEART RATE: 89 BPM | OXYGEN SATURATION: 96 % | BODY MASS INDEX: 32.25 KG/M2 | SYSTOLIC BLOOD PRESSURE: 128 MMHG | WEIGHT: 182 LBS | DIASTOLIC BLOOD PRESSURE: 70 MMHG | RESPIRATION RATE: 16 BRPM | HEIGHT: 63 IN

## 2024-02-13 DIAGNOSIS — Z12.11 SCREENING FOR COLON CANCER: ICD-10-CM

## 2024-02-13 DIAGNOSIS — E55.9 VITAMIN D DEFICIENCY: Primary | ICD-10-CM

## 2024-02-13 DIAGNOSIS — K21.9 GASTROESOPHAGEAL REFLUX DISEASE WITHOUT ESOPHAGITIS: ICD-10-CM

## 2024-02-13 DIAGNOSIS — E78.1 HYPERTRIGLYCERIDEMIA: ICD-10-CM

## 2024-02-13 PROCEDURE — 3017F COLORECTAL CA SCREEN DOC REV: CPT | Performed by: INTERNAL MEDICINE

## 2024-02-13 PROCEDURE — G8417 CALC BMI ABV UP PARAM F/U: HCPCS | Performed by: INTERNAL MEDICINE

## 2024-02-13 PROCEDURE — G2211 COMPLEX E/M VISIT ADD ON: HCPCS | Performed by: INTERNAL MEDICINE

## 2024-02-13 PROCEDURE — G8484 FLU IMMUNIZE NO ADMIN: HCPCS | Performed by: INTERNAL MEDICINE

## 2024-02-13 PROCEDURE — G8427 DOCREV CUR MEDS BY ELIG CLIN: HCPCS | Performed by: INTERNAL MEDICINE

## 2024-02-13 PROCEDURE — 99214 OFFICE O/P EST MOD 30 MIN: CPT | Performed by: INTERNAL MEDICINE

## 2024-02-13 PROCEDURE — 1036F TOBACCO NON-USER: CPT | Performed by: INTERNAL MEDICINE

## 2024-02-13 RX ORDER — SERTRALINE HYDROCHLORIDE 100 MG/1
100 TABLET, FILM COATED ORAL DAILY
Qty: 90 TABLET | Refills: 3 | Status: SHIPPED | OUTPATIENT
Start: 2024-02-13

## 2024-02-13 NOTE — PROGRESS NOTES
[facility] INTERNAL MEDICINE    Visit Date:  2/13/2024  Patient:  Sherrie Yeboah  YOB: 1965    Assessment & Plan     Ventriculomegaly: Follows with neurosurgery.  We will continue to monitor.    GERD: Continue Tagamet.  Asymptomatic at this time.    Depression: Continue Zoloft.  Mood appears to be stable.    Breast mass: Last biopsy showed benign fibrocystic changes, follows with general surgery.  Will undergo alternating surveillance mammograms and MRI.       Diagnosis Orders   1. Vitamin D deficiency  CBC with Auto Differential    Comprehensive Metabolic Panel    Vitamin D 25 Hydroxy      2. Gastroesophageal reflux disease without esophagitis  CBC with Auto Differential    Comprehensive Metabolic Panel      3. Hypertriglyceridemia  Lipid Panel      4. Screening for colon cancer  [facility] [place]          Chief Complaint  Tachycardia (Racing heart rate , no chest pain , higher blood pressure )      History of Present Illness   Presents to follow-up.  Reports that she has been feeling anxious with both her parents being sick.  Says that she has episodes of anxiety as well as tachycardia.  She is concerned about the palpitations, but denies fever, chills, chest pain, shortness of breath at this time.  In the clinic, her heart rate is under control, and is regular.  She agrees to continue to monitor for now.    Has a history of GERD, depression that are unchanged    Objective  /70 (Site: Left Upper Arm, Position: Sitting, Cuff Size: Medium Adult)   Pulse 89   Resp 16   Ht 1.6 m (5' 3\")   Wt 82.6 kg (182 lb)   SpO2 96%   BMI 32.24 kg/m²   Constitutional: No acute distress.  Sits in chair comfortably  Eyes: Sclerae nonicteric. No lid lag or proptosis  HENT: External ears normal. No external lesions on the nose  Neck: No gross masses. Trachea visibly midline  Respiratory: Good air entry bilaterally.  No wheezing or crackles  Cardiovascular:

## 2024-02-14 ENCOUNTER — TELEPHONE (OUTPATIENT)
Dept: SURGERY | Age: 59
End: 2024-02-14

## 2024-03-08 RX ORDER — POLYETHYLENE GLYCOL 3350, SODIUM SULFATE ANHYDROUS, SODIUM BICARBONATE, SODIUM CHLORIDE, POTASSIUM CHLORIDE 236; 22.74; 6.74; 5.86; 2.97 G/4L; G/4L; G/4L; G/4L; G/4L
POWDER, FOR SOLUTION ORAL
Qty: 4000 ML | Refills: 0 | Status: SHIPPED | OUTPATIENT
Start: 2024-03-08

## 2024-03-08 RX ORDER — BISACODYL 5 MG/1
TABLET, DELAYED RELEASE ORAL
Qty: 2 TABLET | Refills: 0 | Status: SHIPPED | OUTPATIENT
Start: 2024-03-08

## 2024-04-17 ENCOUNTER — HOSPITAL ENCOUNTER (OUTPATIENT)
Age: 59
Setting detail: OUTPATIENT SURGERY
Discharge: HOME | End: 2024-04-17
Attending: SURGERY | Admitting: SURGERY
Payer: COMMERCIAL

## 2024-04-17 ENCOUNTER — APPOINTMENT (OUTPATIENT)
Dept: GENERAL RADIOLOGY | Age: 59
End: 2024-04-17
Attending: SURGERY
Payer: COMMERCIAL

## 2024-04-17 ENCOUNTER — ANESTHESIA (OUTPATIENT)
Dept: OPERATING ROOM | Age: 59
End: 2024-04-17
Payer: COMMERCIAL

## 2024-04-17 ENCOUNTER — ANESTHESIA EVENT (OUTPATIENT)
Dept: OPERATING ROOM | Age: 59
End: 2024-04-17
Payer: COMMERCIAL

## 2024-04-17 VITALS
BODY MASS INDEX: 31.64 KG/M2 | TEMPERATURE: 101.6 F | WEIGHT: 178.6 LBS | SYSTOLIC BLOOD PRESSURE: 122 MMHG | HEART RATE: 110 BPM | DIASTOLIC BLOOD PRESSURE: 63 MMHG | RESPIRATION RATE: 16 BRPM | OXYGEN SATURATION: 96 % | HEIGHT: 63 IN

## 2024-04-17 DIAGNOSIS — Z12.11 COLON CANCER SCREENING: ICD-10-CM

## 2024-04-17 LAB
EKG ATRIAL RATE: 107 BPM
EKG P AXIS: 5 DEGREES
EKG P-R INTERVAL: 168 MS
EKG Q-T INTERVAL: 348 MS
EKG QRS DURATION: 76 MS
EKG QTC CALCULATION (BAZETT): 464 MS
EKG R AXIS: 0 DEGREES
EKG T AXIS: 66 DEGREES
EKG VENTRICULAR RATE: 107 BPM
GLUCOSE BLD-MCNC: 102 MG/DL (ref 65–105)

## 2024-04-17 PROCEDURE — 2709999900 HC NON-CHARGEABLE SUPPLY: Performed by: SURGERY

## 2024-04-17 PROCEDURE — 6370000000 HC RX 637 (ALT 250 FOR IP): Performed by: NURSE ANESTHETIST, CERTIFIED REGISTERED

## 2024-04-17 PROCEDURE — 6360000002 HC RX W HCPCS: Performed by: NURSE ANESTHETIST, CERTIFIED REGISTERED

## 2024-04-17 PROCEDURE — 2500000003 HC RX 250 WO HCPCS: Performed by: NURSE ANESTHETIST, CERTIFIED REGISTERED

## 2024-04-17 PROCEDURE — 3609010400 HC COLONOSCOPY POLYPECTOMY HOT BIOPSY: Performed by: SURGERY

## 2024-04-17 PROCEDURE — 3700000000 HC ANESTHESIA ATTENDED CARE: Performed by: SURGERY

## 2024-04-17 PROCEDURE — 94640 AIRWAY INHALATION TREATMENT: CPT

## 2024-04-17 PROCEDURE — 82947 ASSAY GLUCOSE BLOOD QUANT: CPT

## 2024-04-17 PROCEDURE — 71045 X-RAY EXAM CHEST 1 VIEW: CPT

## 2024-04-17 PROCEDURE — 93005 ELECTROCARDIOGRAM TRACING: CPT

## 2024-04-17 PROCEDURE — 7100000010 HC PHASE II RECOVERY - FIRST 15 MIN: Performed by: SURGERY

## 2024-04-17 PROCEDURE — 2580000003 HC RX 258: Performed by: NURSE ANESTHETIST, CERTIFIED REGISTERED

## 2024-04-17 PROCEDURE — 2580000003 HC RX 258: Performed by: SURGERY

## 2024-04-17 PROCEDURE — 88305 TISSUE EXAM BY PATHOLOGIST: CPT

## 2024-04-17 PROCEDURE — 7100000011 HC PHASE II RECOVERY - ADDTL 15 MIN: Performed by: SURGERY

## 2024-04-17 PROCEDURE — 3700000001 HC ADD 15 MINUTES (ANESTHESIA): Performed by: SURGERY

## 2024-04-17 RX ORDER — IPRATROPIUM BROMIDE AND ALBUTEROL SULFATE 2.5; .5 MG/3ML; MG/3ML
1 SOLUTION RESPIRATORY (INHALATION) ONCE
Status: COMPLETED | OUTPATIENT
Start: 2024-04-17 | End: 2024-04-17

## 2024-04-17 RX ORDER — SODIUM CHLORIDE 9 MG/ML
INJECTION, SOLUTION INTRAVENOUS PRN
Status: DISCONTINUED | OUTPATIENT
Start: 2024-04-17 | End: 2024-04-17 | Stop reason: HOSPADM

## 2024-04-17 RX ORDER — SODIUM CHLORIDE, SODIUM LACTATE, POTASSIUM CHLORIDE, CALCIUM CHLORIDE 600; 310; 30; 20 MG/100ML; MG/100ML; MG/100ML; MG/100ML
INJECTION, SOLUTION INTRAVENOUS CONTINUOUS PRN
Status: DISCONTINUED | OUTPATIENT
Start: 2024-04-17 | End: 2024-04-17 | Stop reason: SDUPTHER

## 2024-04-17 RX ORDER — SODIUM CHLORIDE, SODIUM LACTATE, POTASSIUM CHLORIDE, CALCIUM CHLORIDE 600; 310; 30; 20 MG/100ML; MG/100ML; MG/100ML; MG/100ML
INJECTION, SOLUTION INTRAVENOUS CONTINUOUS
Status: DISCONTINUED | OUTPATIENT
Start: 2024-04-17 | End: 2024-04-17 | Stop reason: HOSPADM

## 2024-04-17 RX ORDER — SODIUM CHLORIDE 0.9 % (FLUSH) 0.9 %
5-40 SYRINGE (ML) INJECTION PRN
Status: DISCONTINUED | OUTPATIENT
Start: 2024-04-17 | End: 2024-04-17 | Stop reason: HOSPADM

## 2024-04-17 RX ORDER — SODIUM CHLORIDE 0.9 % (FLUSH) 0.9 %
5-40 SYRINGE (ML) INJECTION EVERY 12 HOURS SCHEDULED
Status: DISCONTINUED | OUTPATIENT
Start: 2024-04-17 | End: 2024-04-17 | Stop reason: HOSPADM

## 2024-04-17 RX ORDER — PROPOFOL 10 MG/ML
INJECTION, EMULSION INTRAVENOUS PRN
Status: DISCONTINUED | OUTPATIENT
Start: 2024-04-17 | End: 2024-04-17 | Stop reason: SDUPTHER

## 2024-04-17 RX ORDER — ONDANSETRON 4 MG/1
4 TABLET, ORALLY DISINTEGRATING ORAL ONCE
Status: COMPLETED | OUTPATIENT
Start: 2024-04-17 | End: 2024-04-17

## 2024-04-17 RX ORDER — DEXMEDETOMIDINE HYDROCHLORIDE 100 UG/ML
INJECTION, SOLUTION INTRAVENOUS PRN
Status: DISCONTINUED | OUTPATIENT
Start: 2024-04-17 | End: 2024-04-17 | Stop reason: SDUPTHER

## 2024-04-17 RX ADMIN — IPRATROPIUM BROMIDE AND ALBUTEROL SULFATE 1 DOSE: 2.5; .5 SOLUTION RESPIRATORY (INHALATION) at 09:50

## 2024-04-17 RX ADMIN — GLUCAGON HYDROCHLORIDE 1 MG: KIT at 08:45

## 2024-04-17 RX ADMIN — PROPOFOL 180 MCG/KG/MIN: 10 INJECTION, EMULSION INTRAVENOUS at 08:23

## 2024-04-17 RX ADMIN — DEXMEDETOMIDINE HYDROCHLORIDE 20 MCG: 100 INJECTION, SOLUTION INTRAVENOUS at 08:22

## 2024-04-17 RX ADMIN — SODIUM CHLORIDE, POTASSIUM CHLORIDE, SODIUM LACTATE AND CALCIUM CHLORIDE: 600; 310; 30; 20 INJECTION, SOLUTION INTRAVENOUS at 08:22

## 2024-04-17 RX ADMIN — SODIUM CHLORIDE, POTASSIUM CHLORIDE, SODIUM LACTATE AND CALCIUM CHLORIDE: 600; 310; 30; 20 INJECTION, SOLUTION INTRAVENOUS at 07:19

## 2024-04-17 RX ADMIN — ONDANSETRON 4 MG: 4 TABLET, ORALLY DISINTEGRATING ORAL at 11:36

## 2024-04-17 RX ADMIN — PROPOFOL 200 MG: 10 INJECTION, EMULSION INTRAVENOUS at 08:22

## 2024-04-17 ASSESSMENT — PAIN - FUNCTIONAL ASSESSMENT
PAIN_FUNCTIONAL_ASSESSMENT: 0-10
PAIN_FUNCTIONAL_ASSESSMENT: NONE - DENIES PAIN

## 2024-04-17 NOTE — OP NOTE
[id number] [place] [address]                            OPERATIVE REPORT      PATIENT NAME: MARGO LONG           : 1965  MED REC NO: [id number]                         ROOM: [facility]  ACCOUNT NO: [id number]                       ADMIT DATE: 2024  PROVIDER: Prabhu Win DO      DATE OF PROCEDURE:  2024    SURGEON:  Prabhu Win DO    ASSISTANT:  None.    PREOPERATIVE DIAGNOSIS:  Screening.    POSTOPERATIVE DIAGNOSES:    1. Screening.  2. Colon polyps.  3. Redundant tortuous colon.    PROCEDURE:  Colonoscopy with hot forceps polypectomies.    ANESTHESIA:  MAC.    ESTIMATED BLOOD LOSS:  Minimal.    FLUIDS:  Per Anesthesia record.    COMPLICATIONS:  None.    SPECIMENS:    1. Polyp in cecum removed with hot forceps.  2. Polyp in rectum removed with hot forceps.    INDICATION FOR PROCEDURE:  The patient is a 59-year-old female referred to my office for repeat screening colonoscopy.  After evaluation, decision was made to proceed.  Prior to the time of the procedure, risks, benefits, and alternatives were explained to the patient, and consent was obtained.    DESCRIPTION OF PROCEDURE:  The patient was brought to endoscopy suite, kept on preop gurney, placed in the left lateral decubitus position.  Monitoring devices were placed.  MAC anesthesia was induced.  After induction of anesthesia, a time-out was performed, and the correct patient and procedure were verified.  Digital rectal exam was performed which showed no abnormalities.  The Olympus video endoscope was lubricated and inserted in the patient's rectum which was gently insufflated with air.  I again did advance the scope through the colon under visualization.  Unfortunately, the patient had a very tortuous redundant colon, and advancement was difficult, and we had to perform multiple abdominal pressure maneuvers, repositioning just to get the scope to

## 2024-04-17 NOTE — ANESTHESIA POSTPROCEDURE EVALUATION
Department of Anesthesiology  Postprocedure Note    Patient: Sherrie Yeboah  MRN: [id number]  YOB: 1965  Date of evaluation: 4/17/2024    Procedure Summary       Date: 04/17/24 Room / Location: [facility]    Anesthesia Start: 0822 Anesthesia Stop: 0905    Procedure: COLONOSCOPY POLYPECTOMY HOT BIOPSY Diagnosis:       Colon cancer screening      (Colon cancer screening [Z12.11])    Surgeons: Prabhu Win DO Responsible Provider: Mckay Holland APRN - CRNA    Anesthesia Type: General, TIVA ASA Status: 3            Anesthesia Type: General, TIVA    Susan Phase I: Susan Score: 10    Susan Phase II:      Anesthesia Post Evaluation    Patient location during evaluation: bedside  Level of consciousness: sleepy but conscious  Airway patency: patent  Nausea & Vomiting: no nausea and no vomiting  Cardiovascular status: hemodynamically stable  Respiratory status: spontaneous ventilation  Hydration status: euvolemic  Pain management: satisfactory to patient    No notable events documented.

## 2024-04-17 NOTE — PROGRESS NOTES
Anesthesia;    Notified by post-procedural RN that Mrs. Yeboah was shivering and noted chest pain (left, front) when breathing.  She describes the symptoms as \"flu-like\" and associated them to when she \"has had low blood sugar.\"      SP02 89-91%.  Duo-Neb treatment ordered. CXR obtained.    Slight nausea with first attempt at eating.  Zofran ordered.  After incentive spirometry, Sherrie noted breathing was easier; and chest pain was less.  ECG ordered to rule out ACS.      CXR 4/17/2024    FINDINGS:  Cardiomediastinal silhouette is within normal limits.  The apparent bronchial  wall thickening and reticulonodular opacities in the left mid lung.  No  pleural effusion or pneumothorax.  No gross bony abnormality.     IMPRESSION:  Possible infectious/inflammatory airways process in the left mid lung.  Clinical correlation advised.    I personally viewed this film and compared to a previous film from 8/2021 noting consistency in the prominent interstitital  mid lower zones.    8/29/2021    Sherrie sitting up, looking better.  Discussed case with Dr. Win.  We both agree to discharge home and continue incentive spirometer when awake and home CPAP when sleeping.  Call back or go to the emergency room if experiencing any more chest pain, difficulty breathing, shortness of breath or worsening flu-like symptoms.

## 2024-04-17 NOTE — ANESTHESIA PRE PROCEDURE
Department of Anesthesiology  Preprocedure Note       Name:  Sherrie Yeboah   Age:  59 y.o.  :  1965                                          MRN:  [id number]         Date:  2024      Surgeon: Surgeon(s):  Prabhu Win DO    Procedure: Procedure(s):  COLONOSCOPY    Medications prior to admission:   Prior to Admission medications    Medication Sig Start Date End Date Taking? Authorizing Provider   polyethylene glycol (GOLYTELY) 236 g solution Mix as directed. At 4pm the day prior to your procedure, drink an 8oz glass every 10 minutes until gone. 3/8/24   Prabhu Win DO   bisacodyl 5 MG EC tablet Take 2 tabs by mouth at noon the day prior to your procedure. 3/8/24   Prabhu Win DO   cimetidine (TAGAMET) 200 MG tablet TAKE 1 TABLET BY MOUTH TWO TIMES A DAY AS NEEDED FOR HEARTBURN 24   Annalee Escobar MD   sertraline (ZOLOFT) 100 MG tablet Take 1 tablet by mouth daily 24   Annalee Escobar MD   acetaminophen (TYLENOL) 500 MG tablet Take 1 tablet by mouth every 6 hours as needed for Pain  Patient not taking: Reported on 2023    Sravan Mackey MD   cetirizine (ZYRTEC) 10 MG tablet Take 1 tablet by mouth daily    Sravan Mackey MD   Multiple Vitamins-Minerals (MULTIVITAL PO) Take 1 tablet by mouth daily as needed    Sravan Mackey MD   Cholecalciferol (VITAMIN D) 2000 UNITS CAPS capsule Take 2,000 Units by mouth daily    Sravan Mackey MD       Current medications:    No current facility-administered medications for this encounter.       Allergies:    Allergies   Allergen Reactions    Doxycycline      Urticaria      Aspirin Rash    Penicillins Swelling and Rash       Problem List:    Patient Active Problem List   Diagnosis Code    Gastroesophageal reflux disease without esophagitis K21.9    Fatigue R53.83    Allergic rhinitis J30.9    Low back pain M54.50    Other disorder of menstruation and other abnormal bleeding from female genital tract N92.5, N93.8

## 2024-04-17 NOTE — H&P
Subjective   Sherrie Yeboah is a 59 y.o. female who presents today for repeat screening colonoscopy.  Last was in .  Denies any recent changes in bowel movements, blood per rectum, melena or unintended weight loss.  No reported family hx of colon cancer.    Past Medical History:   Diagnosis Date    Allergic rhinitis     Anxiety     Originally , with panic    Chronic tension headaches     occasional    Depression     Zoloft started     Depression     DUB (dysfunctional uterine bleeding)     ablation    Fatigue     GERD (gastroesophageal reflux disease)     No prior scope as of PE 16- mild sx over yrs managed without meds    Hepatitis A 1969    age 4    Internal hemorrhoids     on scope     Lateral epicondylitis 2014    resolved    Low back pain     YOJANA on CPAP     sleep study  CPAP 12    Other disorder of menstruation and other abnormal bleeding from female genital tract 2013    S/P excision of ganglion cyst     Thyroid nodule     u/s  stable sub cm     Trauma     Uterine fibroid        Past Surgical History:   Procedure Laterality Date    BREAST BIOPSY Right     right stereotactic biopsy    BREAST BIOPSY Left     stereotactic biopsy     SECTION      COLONOSCOPY  2014    Zoë Leahy    DILATION AND CURETTAGE  13 and 2008    Dr AUDI Bueno    DILATION AND CURETTAGE OF UTERUS  2013    ENDOMETRIAL ABLATION  2013    EXCISION LESION HAND / FINGER Right 2019    Excision Right Wrist ganglion cyst performed by Roberto Pappas MD at [facility] OR    HYSTEROSCOPY  2013    Dr AUDI Bueno    HYSTEROSCOPY  2013    MRI BREAST BX USING DEVICE RIGHT Right 2021    MRI BREAST BX USING DEVICE RIGHT 3/1/2021 [facility] MRI    TUBAL LIGATION      US BREAST BIOPSY W LOC DEVICE 1ST LESION RIGHT Right 2021    US BREAST NEEDLE BIOPSY RIGHT 2021 [facility] ULTRASOUND    WISDOM TOOTH EXTRACTION         No current facility-administered  intact      Assessment     1. Screening colonoscopy      Plan     1. Proceed as planned    Electronically signed by Prabhu Win DO on 4/16/2024 at 8:07 PM      (Please note that portions of this note were completed with a voice recognition program.  Efforts were made to edit the dictations but occasionally words are mis-transcribed.)

## 2024-04-18 ENCOUNTER — OFFICE VISIT (OUTPATIENT)
Dept: INTERNAL MEDICINE | Age: 59
End: 2024-04-18
Payer: COMMERCIAL

## 2024-04-18 VITALS
TEMPERATURE: 101.6 F | DIASTOLIC BLOOD PRESSURE: 74 MMHG | RESPIRATION RATE: 16 BRPM | HEART RATE: 110 BPM | HEIGHT: 63 IN | BODY MASS INDEX: 31.54 KG/M2 | WEIGHT: 178 LBS | SYSTOLIC BLOOD PRESSURE: 130 MMHG | OXYGEN SATURATION: 93 %

## 2024-04-18 DIAGNOSIS — G93.89 CEREBRAL VENTRICULOMEGALY: ICD-10-CM

## 2024-04-18 DIAGNOSIS — K21.9 GASTROESOPHAGEAL REFLUX DISEASE WITHOUT ESOPHAGITIS: Primary | ICD-10-CM

## 2024-04-18 DIAGNOSIS — N63.0 MASS OF BREAST, UNSPECIFIED LATERALITY: ICD-10-CM

## 2024-04-18 PROCEDURE — G8427 DOCREV CUR MEDS BY ELIG CLIN: HCPCS | Performed by: INTERNAL MEDICINE

## 2024-04-18 PROCEDURE — 3017F COLORECTAL CA SCREEN DOC REV: CPT | Performed by: INTERNAL MEDICINE

## 2024-04-18 PROCEDURE — 99214 OFFICE O/P EST MOD 30 MIN: CPT | Performed by: INTERNAL MEDICINE

## 2024-04-18 PROCEDURE — 1036F TOBACCO NON-USER: CPT | Performed by: INTERNAL MEDICINE

## 2024-04-18 PROCEDURE — G8417 CALC BMI ABV UP PARAM F/U: HCPCS | Performed by: INTERNAL MEDICINE

## 2024-04-18 RX ORDER — CLINDAMYCIN HYDROCHLORIDE 150 MG/1
450 CAPSULE ORAL 3 TIMES DAILY
Qty: 63 CAPSULE | Refills: 0 | Status: SHIPPED | OUTPATIENT
Start: 2024-04-18 | End: 2024-04-25

## 2024-04-18 NOTE — PROGRESS NOTES
[facility] INTERNAL MEDICINE    Visit Date:  4/18/2024  Patient:  Sherrie Yeboah  YOB: 1965    Assessment & Plan     Aspiration pneumonia: Given that she has a fever and tachycardia I am inclined to treat with antibiotics.  CRB-65 score of 0.  Saturating fine on room air.  Will treat with clindamycin.  I advised that she call the office if she does not improve.    Ventriculomegaly: Follows with neurosurgery.  We will continue to monitor.    GERD: Continue Tagamet.  Asymptomatic at this time.    Depression: Continue Zoloft.  Mood appears to be stable.    Breast mass: Last biopsy showed benign fibrocystic changes, follows with general surgery.  Will undergo alternating surveillance mammograms and MRI.       Diagnosis Orders   1. Gastroesophageal reflux disease without esophagitis        2. Mass of breast, unspecified laterality        3. Cerebral ventriculomegaly              Chief Complaint  Shortness of Breath (Cough , fever , had colonoscopy done yesterday, Chest xray , neg for COVID )      History of Present Illness   Presents to follow-up.  She underwent a colonoscopy yesterday, however following that it appears that she experienced shivering and chest pain, x-ray was done and showed opacities in the left lung with possible infectious or inflammatory process in the left lung.  She was discharged and now comes in to follow-up, and says that she has pain in her left chest, cough, fever.  Says that she feels really tired as well.  It seems that she might have experienced possible aspiration pneumonia due to anesthesia.  However, I advised her that I cannot confirm.    Has a history of GERD, depression that are unchanged    Objective  /74 (Site: Left Upper Arm, Position: Sitting, Cuff Size: Large Adult)   Pulse (!) 110   Temp (!) 101.6 °F (38.7 °C) (Tympanic)   Resp 16   Ht 1.6 m (5' 3\")   Wt 80.7 kg (178 lb)   SpO2 93%   BMI 31.53 kg/m²   Constitutional: No acute

## 2024-04-18 NOTE — FLOWSHEET NOTE
Pt states she is still running a fever today. Feels breathing is easier, but is still having pain in left lung. Has been using incentive spirometer and is coughing stuff up. Dr. Win notified. He advised pt should report to ER or see PCP. Pt notified. Transferred call to Dr. Escobar's office.

## 2024-04-19 LAB — SURGICAL PATHOLOGY REPORT: NORMAL

## 2024-04-30 PROBLEM — Z12.11 COLON CANCER SCREENING: Status: ACTIVE | Noted: 2024-04-30

## 2024-05-30 PROBLEM — Z12.11 COLON CANCER SCREENING: Status: RESOLVED | Noted: 2024-04-30 | Resolved: 2024-05-30

## 2024-07-03 ENCOUNTER — OFFICE VISIT (OUTPATIENT)
Dept: OBGYN | Age: 59
End: 2024-07-03
Payer: COMMERCIAL

## 2024-07-03 VITALS
BODY MASS INDEX: 31.89 KG/M2 | RESPIRATION RATE: 18 BRPM | OXYGEN SATURATION: 97 % | HEIGHT: 63 IN | HEART RATE: 74 BPM | DIASTOLIC BLOOD PRESSURE: 80 MMHG | SYSTOLIC BLOOD PRESSURE: 118 MMHG | WEIGHT: 180 LBS

## 2024-07-03 DIAGNOSIS — Z98.890 S/P CONIZATION OF CERVIX: ICD-10-CM

## 2024-07-03 DIAGNOSIS — Z91.89 INCREASED RISK OF BREAST CANCER: ICD-10-CM

## 2024-07-03 DIAGNOSIS — Z01.419 WELL WOMAN EXAM WITH ROUTINE GYNECOLOGICAL EXAM: Primary | ICD-10-CM

## 2024-07-03 PROCEDURE — 99396 PREV VISIT EST AGE 40-64: CPT | Performed by: OBSTETRICS & GYNECOLOGY

## 2024-07-03 RX ORDER — IBUPROFEN 200 MG
200 TABLET ORAL EVERY 6 HOURS PRN
COMMUNITY

## 2024-07-03 NOTE — PROGRESS NOTES
History and Physical  [facility]  [facility]  [address] [facility] [address]  Phone: [phone]  Fax: [phone]    Sherrie Yeboah  7/3/2024              59 y.o.  Chief Complaint   Patient presents with    Other       No LMP recorded. Patient has had an ablation.             Primary Care Physician: Annalee Escobar MD    The patient was seen and examined. She has no chief complaint today and is here for her annual exam.  Her bowels are regular. There are no voiding complaints. She denies any bloating.  She denies vaginal discharge and was counseled on STD's and the need for barrier contraception.     HPI : Sherrie Yeboah is a 59 y.o. female     NO cc Annual  no Bloating  no Early Satiety  no Unexplained weight change of more than 15 lbs  no  PMB  no  PCB  ________________________________________________________________________  OB History    Para Term  AB Living   4 3 3 0 1 3   SAB IAB Ectopic Molar Multiple Live Births   1 0 0 0 0 3      # Outcome Date GA Lbr Valentin/2nd Weight Sex Delivery Anes PTL Lv   4 Term 1993    M    TEJA   3 SAB 1992           2 Term 1988    F    TEJA   1 Term 1985    M    TEJA     Past Medical History:   Diagnosis Date    Allergic rhinitis     Anxiety     Originally , with panic    Chronic tension headaches     occasional    Colon cancer screening 2024    Depression     Zoloft started     Depression     DUB (dysfunctional uterine bleeding)     ablation    Fatigue     GERD (gastroesophageal reflux disease)     No prior scope as of PE 16- mild sx over yrs managed without meds    Hepatitis A 1969    age 4    Internal hemorrhoids     on scope     Lateral epicondylitis 2014    resolved    Low back pain     YOJANA on CPAP     sleep study  CPAP 12    Other disorder of menstruation and other abnormal bleeding from female genital tract 2013    S/P excision of ganglion cyst     Thyroid nodule

## 2024-07-15 ENCOUNTER — HOSPITAL ENCOUNTER (OUTPATIENT)
Dept: MAMMOGRAPHY | Age: 59
Discharge: HOME | End: 2024-07-17
Payer: COMMERCIAL

## 2024-07-15 VITALS — BODY MASS INDEX: 30.73 KG/M2 | WEIGHT: 180 LBS | HEIGHT: 64 IN

## 2024-07-15 DIAGNOSIS — Z91.89 AT HIGH RISK FOR BREAST CANCER: ICD-10-CM

## 2024-07-15 PROCEDURE — 77063 BREAST TOMOSYNTHESIS BI: CPT

## 2024-09-07 ENCOUNTER — HOSPITAL ENCOUNTER (OUTPATIENT)
Age: 59
Discharge: HOME | End: 2024-09-07
Payer: COMMERCIAL

## 2024-09-07 DIAGNOSIS — K21.9 GASTROESOPHAGEAL REFLUX DISEASE WITHOUT ESOPHAGITIS: ICD-10-CM

## 2024-09-07 DIAGNOSIS — E78.1 HYPERTRIGLYCERIDEMIA: ICD-10-CM

## 2024-09-07 DIAGNOSIS — E55.9 VITAMIN D DEFICIENCY: ICD-10-CM

## 2024-09-07 LAB
25(OH)D3 SERPL-MCNC: 36.6 NG/ML (ref 30–100)
ALBUMIN SERPL-MCNC: 4.5 G/DL (ref 3.5–5.2)
ALBUMIN/GLOB SERPL: 1.6 {RATIO} (ref 1–2.5)
ALP SERPL-CCNC: 91 U/L (ref 35–104)
ALT SERPL-CCNC: 17 U/L (ref 5–33)
ANION GAP SERPL CALCULATED.3IONS-SCNC: 9 MMOL/L (ref 9–17)
AST SERPL-CCNC: 19 U/L
BASOPHILS # BLD: 0.06 K/UL (ref 0–0.2)
BASOPHILS NFR BLD: 1 % (ref 0–2)
BILIRUB SERPL-MCNC: 0.4 MG/DL (ref 0.3–1.2)
BUN SERPL-MCNC: 12 MG/DL (ref 8–23)
BUN/CREAT SERPL: 15 (ref 9–20)
CALCIUM SERPL-MCNC: 9.5 MG/DL (ref 8.6–10.4)
CHLORIDE SERPL-SCNC: 104 MMOL/L (ref 98–107)
CHOLEST SERPL-MCNC: 156 MG/DL (ref 0–199)
CHOLESTEROL/HDL RATIO: 3
CO2 SERPL-SCNC: 30 MMOL/L (ref 20–31)
CREAT SERPL-MCNC: 0.8 MG/DL (ref 0.5–0.9)
EOSINOPHIL # BLD: 0.24 K/UL (ref 0–0.44)
EOSINOPHILS RELATIVE PERCENT: 5 % (ref 1–4)
ERYTHROCYTE [DISTWIDTH] IN BLOOD BY AUTOMATED COUNT: 12.9 % (ref 11.8–14.4)
GFR, ESTIMATED: 84 ML/MIN/1.73M2
GLUCOSE SERPL-MCNC: 94 MG/DL (ref 70–99)
HCT VFR BLD AUTO: 38.2 % (ref 36.3–47.1)
HDLC SERPL-MCNC: 53 MG/DL
HGB BLD-MCNC: 13.1 G/DL (ref 11.9–15.1)
IMM GRANULOCYTES # BLD AUTO: <0.03 K/UL (ref 0–0.3)
IMM GRANULOCYTES NFR BLD: 0 %
LDLC SERPL CALC-MCNC: 60 MG/DL (ref 0–100)
LYMPHOCYTES NFR BLD: 1.28 K/UL (ref 1.1–3.7)
LYMPHOCYTES RELATIVE PERCENT: 25 % (ref 24–43)
MCH RBC QN AUTO: 29.1 PG (ref 25.2–33.5)
MCHC RBC AUTO-ENTMCNC: 34.3 G/DL (ref 25.2–33.5)
MCV RBC AUTO: 84.9 FL (ref 82.6–102.9)
MONOCYTES NFR BLD: 0.34 K/UL (ref 0.1–1.2)
MONOCYTES NFR BLD: 7 % (ref 3–12)
NEUTROPHILS NFR BLD: 62 % (ref 36–65)
NEUTS SEG NFR BLD: 3.17 K/UL (ref 1.5–8.1)
NRBC BLD-RTO: 0 PER 100 WBC
PLATELET # BLD AUTO: 247 K/UL (ref 138–453)
PMV BLD AUTO: 9.3 FL (ref 8.1–13.5)
POTASSIUM SERPL-SCNC: 4 MMOL/L (ref 3.7–5.3)
PROT SERPL-MCNC: 7.4 G/DL (ref 6.4–8.3)
RBC # BLD AUTO: 4.5 M/UL (ref 3.95–5.11)
SODIUM SERPL-SCNC: 143 MMOL/L (ref 135–144)
TRIGL SERPL-MCNC: 215 MG/DL
VLDLC SERPL CALC-MCNC: 43 MG/DL
WBC OTHER # BLD: 5.1 K/UL (ref 3.5–11.3)

## 2024-09-07 PROCEDURE — 36415 COLL VENOUS BLD VENIPUNCTURE: CPT

## 2024-09-07 PROCEDURE — 85025 COMPLETE CBC W/AUTO DIFF WBC: CPT

## 2024-09-07 PROCEDURE — 82306 VITAMIN D 25 HYDROXY: CPT

## 2024-09-07 PROCEDURE — 80061 LIPID PANEL: CPT

## 2024-09-07 PROCEDURE — 80053 COMPREHEN METABOLIC PANEL: CPT

## 2024-09-12 ENCOUNTER — OFFICE VISIT (OUTPATIENT)
Dept: INTERNAL MEDICINE | Age: 59
End: 2024-09-12
Payer: COMMERCIAL

## 2024-09-12 VITALS
DIASTOLIC BLOOD PRESSURE: 82 MMHG | RESPIRATION RATE: 16 BRPM | HEIGHT: 64 IN | BODY MASS INDEX: 31 KG/M2 | SYSTOLIC BLOOD PRESSURE: 126 MMHG | HEART RATE: 70 BPM | WEIGHT: 181.6 LBS

## 2024-09-12 DIAGNOSIS — N63.0 MASS OF BREAST, UNSPECIFIED LATERALITY: ICD-10-CM

## 2024-09-12 DIAGNOSIS — E55.9 VITAMIN D DEFICIENCY: Primary | ICD-10-CM

## 2024-09-12 DIAGNOSIS — E78.1 HYPERTRIGLYCERIDEMIA: ICD-10-CM

## 2024-09-12 DIAGNOSIS — G93.89 CEREBRAL VENTRICULOMEGALY: ICD-10-CM

## 2024-09-12 DIAGNOSIS — K21.9 GASTROESOPHAGEAL REFLUX DISEASE WITHOUT ESOPHAGITIS: ICD-10-CM

## 2024-09-12 PROCEDURE — 1036F TOBACCO NON-USER: CPT | Performed by: INTERNAL MEDICINE

## 2024-09-12 PROCEDURE — 99214 OFFICE O/P EST MOD 30 MIN: CPT | Performed by: INTERNAL MEDICINE

## 2024-09-12 PROCEDURE — G8417 CALC BMI ABV UP PARAM F/U: HCPCS | Performed by: INTERNAL MEDICINE

## 2024-09-12 PROCEDURE — G8427 DOCREV CUR MEDS BY ELIG CLIN: HCPCS | Performed by: INTERNAL MEDICINE

## 2024-09-12 PROCEDURE — 3017F COLORECTAL CA SCREEN DOC REV: CPT | Performed by: INTERNAL MEDICINE

## 2024-09-12 RX ORDER — TRIAMCINOLONE ACETONIDE 1 MG/G
CREAM TOPICAL
Qty: 30 G | Refills: 0 | Status: SHIPPED | OUTPATIENT
Start: 2024-09-12

## 2024-09-12 SDOH — ECONOMIC STABILITY: FOOD INSECURITY: WITHIN THE PAST 12 MONTHS, THE FOOD YOU BOUGHT JUST DIDN'T LAST AND YOU DIDN'T HAVE MONEY TO GET MORE.: NEVER TRUE

## 2024-09-12 SDOH — ECONOMIC STABILITY: FOOD INSECURITY: WITHIN THE PAST 12 MONTHS, YOU WORRIED THAT YOUR FOOD WOULD RUN OUT BEFORE YOU GOT MONEY TO BUY MORE.: NEVER TRUE

## 2024-09-12 SDOH — ECONOMIC STABILITY: INCOME INSECURITY: HOW HARD IS IT FOR YOU TO PAY FOR THE VERY BASICS LIKE FOOD, HOUSING, MEDICAL CARE, AND HEATING?: NOT HARD AT ALL

## 2024-11-26 DIAGNOSIS — G47.33 OSA ON CPAP: Primary | ICD-10-CM

## 2024-12-10 DIAGNOSIS — G91.2 NPH (NORMAL PRESSURE HYDROCEPHALUS) (HCC): ICD-10-CM

## 2024-12-10 DIAGNOSIS — Z80.3 FAMILY HISTORY OF BREAST CANCER: Primary | ICD-10-CM

## 2024-12-31 ENCOUNTER — HOSPITAL ENCOUNTER (OUTPATIENT)
Age: 59
Discharge: HOME | End: 2024-12-31
Payer: COMMERCIAL

## 2024-12-31 DIAGNOSIS — G91.2 NPH (NORMAL PRESSURE HYDROCEPHALUS) (HCC): ICD-10-CM

## 2024-12-31 DIAGNOSIS — Z80.3 FAMILY HISTORY OF BREAST CANCER: ICD-10-CM

## 2024-12-31 LAB
CREAT SERPL-MCNC: 0.8 MG/DL (ref 0.5–0.9)
GFR, ESTIMATED: 84 ML/MIN/1.73M2

## 2024-12-31 PROCEDURE — 82565 ASSAY OF CREATININE: CPT

## 2024-12-31 PROCEDURE — 36415 COLL VENOUS BLD VENIPUNCTURE: CPT

## 2025-01-16 ENCOUNTER — HOSPITAL ENCOUNTER (OUTPATIENT)
Dept: MRI IMAGING | Age: 60
Discharge: HOME | End: 2025-01-18
Attending: INTERNAL MEDICINE
Payer: COMMERCIAL

## 2025-01-16 DIAGNOSIS — Z80.3 FAMILY HISTORY OF BREAST CANCER: ICD-10-CM

## 2025-01-16 PROCEDURE — 6360000004 HC RX CONTRAST MEDICATION: Performed by: INTERNAL MEDICINE

## 2025-01-16 PROCEDURE — C8908 MRI W/O FOL W/CONT, BREAST,: HCPCS

## 2025-01-16 PROCEDURE — A9579 GAD-BASE MR CONTRAST NOS,1ML: HCPCS | Performed by: INTERNAL MEDICINE

## 2025-01-16 RX ADMIN — GADOTERIDOL 20 ML: 279.3 INJECTION, SOLUTION INTRAVENOUS at 14:10

## 2025-02-26 ENCOUNTER — OFFICE VISIT (OUTPATIENT)
Dept: PULMONOLOGY | Age: 60
End: 2025-02-26
Payer: COMMERCIAL

## 2025-02-26 VITALS
HEART RATE: 81 BPM | HEIGHT: 64 IN | TEMPERATURE: 97.7 F | OXYGEN SATURATION: 98 % | SYSTOLIC BLOOD PRESSURE: 134 MMHG | BODY MASS INDEX: 30.87 KG/M2 | DIASTOLIC BLOOD PRESSURE: 86 MMHG | WEIGHT: 180.8 LBS

## 2025-02-26 DIAGNOSIS — G47.33 OSA ON CPAP: Primary | ICD-10-CM

## 2025-02-26 PROCEDURE — G8417 CALC BMI ABV UP PARAM F/U: HCPCS | Performed by: INTERNAL MEDICINE

## 2025-02-26 PROCEDURE — G8427 DOCREV CUR MEDS BY ELIG CLIN: HCPCS | Performed by: INTERNAL MEDICINE

## 2025-02-26 PROCEDURE — 1036F TOBACCO NON-USER: CPT | Performed by: INTERNAL MEDICINE

## 2025-02-26 PROCEDURE — 3017F COLORECTAL CA SCREEN DOC REV: CPT | Performed by: INTERNAL MEDICINE

## 2025-02-26 PROCEDURE — 99213 OFFICE O/P EST LOW 20 MIN: CPT | Performed by: INTERNAL MEDICINE

## 2025-02-26 NOTE — PROGRESS NOTES
of Paying Living Expenses: Not hard at all   Food Insecurity: No Food Insecurity (9/12/2024)    Hunger Vital Sign     Worried About Running Out of Food in the Last Year: Never true     Ran Out of Food in the Last Year: Never true   Transportation Needs: Unknown (9/12/2024)    PRAPARE - Transportation     Lack of Transportation (Non-Medical): No   Housing Stability: Unknown (9/12/2024)    Housing Stability Vital Sign     Homeless in the Last Year: No        Physical Exam  Head and neck atraumatic, normocephalic    Lymph nodes-no cervical, supraclavicular lymphadenopathy    Neck-no JVP elevation    Lungs - Ventilating all lobes without any rales, rhonchi, wheezes or dullness.  No bronchial breath sounds.  Chest expansion equal bilaterally    CVS- S1, S2 regular.  No S3 no S4, no murmurs    Abdomen-nontender, nondistended.  Bowel sounds are present.  No organomegaly    Lower extremity-no edema    Upper extremity-no edema    Neurological-grossly normal cranial nerves.  No overt motor deficit     /86   Pulse 81   Temp 97.7 °F (36.5 °C)   Ht 1.626 m (5' 4\")   Wt 82 kg (180 lb 12.8 oz)   SpO2 98%   BMI 31.03 kg/m²                           Results  Testing  CPAP report: Apneas are under control, less than five. Pressure is set between 8-16, average pressure is about 10.         Assessment   Diagnosis Orders   1. YOJANA on CPAP  DME Order for CPAP as OP          Plan:    Assessment & Plan  1. Sleep apnea.  Her apneas are well-managed, with a frequency of less than 5. The CPAP machine's pressure settings range from 8 to 16, with an average requirement of approximately 10. She has been using the CPAP consistently, 89 out of 90 days. A prescription for new CPAP supplies, including a mask and headgear, has been issued.  .    Follow-up  The patient will follow up in 1 year.       Requested Prescriptions      No prescriptions requested or ordered in this encounter       There are no discontinued medications.    Sherrie

## 2025-03-17 RX ORDER — SERTRALINE HYDROCHLORIDE 100 MG/1
100 TABLET, FILM COATED ORAL DAILY
Qty: 90 TABLET | Refills: 0 | Status: SHIPPED | OUTPATIENT
Start: 2025-03-17

## 2025-03-17 NOTE — TELEPHONE ENCOUNTER
Sherrie called requesting a refill of the below medication which has been pended for you:     Requested Prescriptions     Pending Prescriptions Disp Refills    sertraline (ZOLOFT) 100 MG tablet [Pharmacy Med Name: SERTRALINE  MG TABLET] 90 tablet 3     Sig: TAKE 1 TABLET BY MOUTH EVERY DAY       Last Appointment Date: 9/12/2024  Next Appointment Date: 4/1/2025    Allergies   Allergen Reactions    Doxycycline      Urticaria      Aspirin Rash    Penicillins Swelling and Rash

## 2025-03-22 ENCOUNTER — HOSPITAL ENCOUNTER (OUTPATIENT)
Age: 60
Discharge: HOME | End: 2025-03-22
Payer: COMMERCIAL

## 2025-03-22 DIAGNOSIS — E78.1 HYPERTRIGLYCERIDEMIA: ICD-10-CM

## 2025-03-22 DIAGNOSIS — E55.9 VITAMIN D DEFICIENCY: ICD-10-CM

## 2025-03-22 LAB
25(OH)D3 SERPL-MCNC: 37.7 NG/ML (ref 30–100)
ALBUMIN SERPL-MCNC: 4.5 G/DL (ref 3.5–5.2)
ALBUMIN/GLOB SERPL: 1.5 {RATIO} (ref 1–2.5)
ALP SERPL-CCNC: 76 U/L (ref 35–104)
ALT SERPL-CCNC: 15 U/L (ref 5–33)
ANION GAP SERPL CALCULATED.3IONS-SCNC: 10 MMOL/L (ref 9–17)
AST SERPL-CCNC: 18 U/L
BASOPHILS # BLD: 0.04 K/UL (ref 0–0.2)
BASOPHILS NFR BLD: 1 % (ref 0–2)
BILIRUB SERPL-MCNC: 0.3 MG/DL (ref 0.3–1.2)
BUN SERPL-MCNC: 20 MG/DL (ref 8–23)
BUN/CREAT SERPL: 22 (ref 9–20)
CALCIUM SERPL-MCNC: 9.9 MG/DL (ref 8.6–10.4)
CHLORIDE SERPL-SCNC: 106 MMOL/L (ref 98–107)
CHOLEST SERPL-MCNC: 136 MG/DL (ref 0–199)
CHOLESTEROL/HDL RATIO: 2.4
CO2 SERPL-SCNC: 27 MMOL/L (ref 20–31)
CREAT SERPL-MCNC: 0.9 MG/DL (ref 0.5–0.9)
EOSINOPHIL # BLD: 0.21 K/UL (ref 0–0.44)
EOSINOPHILS RELATIVE PERCENT: 5 % (ref 1–4)
ERYTHROCYTE [DISTWIDTH] IN BLOOD BY AUTOMATED COUNT: 13.1 % (ref 11.8–14.4)
GFR, ESTIMATED: 72 ML/MIN/1.73M2
GLUCOSE SERPL-MCNC: 91 MG/DL (ref 70–99)
HCT VFR BLD AUTO: 39.8 % (ref 36.3–47.1)
HDLC SERPL-MCNC: 56 MG/DL
HGB BLD-MCNC: 13.2 G/DL (ref 11.9–15.1)
IMM GRANULOCYTES # BLD AUTO: <0.03 K/UL (ref 0–0.3)
IMM GRANULOCYTES NFR BLD: 0 %
LDLC SERPL CALC-MCNC: 55 MG/DL (ref 0–100)
LYMPHOCYTES NFR BLD: 1.3 K/UL (ref 1.1–3.7)
LYMPHOCYTES RELATIVE PERCENT: 30 % (ref 24–43)
MCH RBC QN AUTO: 28.3 PG (ref 25.2–33.5)
MCHC RBC AUTO-ENTMCNC: 33.2 G/DL (ref 25.2–33.5)
MCV RBC AUTO: 85.2 FL (ref 82.6–102.9)
MONOCYTES NFR BLD: 0.41 K/UL (ref 0.1–1.2)
MONOCYTES NFR BLD: 9 % (ref 3–12)
NEUTROPHILS NFR BLD: 55 % (ref 36–65)
NEUTS SEG NFR BLD: 2.37 K/UL (ref 1.5–8.1)
NRBC BLD-RTO: 0 PER 100 WBC
PLATELET # BLD AUTO: 235 K/UL (ref 138–453)
PMV BLD AUTO: 9.2 FL (ref 8.1–13.5)
POTASSIUM SERPL-SCNC: 4.4 MMOL/L (ref 3.7–5.3)
PROT SERPL-MCNC: 7.6 G/DL (ref 6.4–8.3)
RBC # BLD AUTO: 4.67 M/UL (ref 3.95–5.11)
SODIUM SERPL-SCNC: 143 MMOL/L (ref 135–144)
TRIGL SERPL-MCNC: 123 MG/DL
VLDLC SERPL CALC-MCNC: 25 MG/DL (ref 1–30)
WBC OTHER # BLD: 4.3 K/UL (ref 3.5–11.3)

## 2025-03-22 PROCEDURE — 80061 LIPID PANEL: CPT

## 2025-03-22 PROCEDURE — 36415 COLL VENOUS BLD VENIPUNCTURE: CPT

## 2025-03-22 PROCEDURE — 85025 COMPLETE CBC W/AUTO DIFF WBC: CPT

## 2025-03-22 PROCEDURE — 82306 VITAMIN D 25 HYDROXY: CPT

## 2025-03-22 PROCEDURE — 80053 COMPREHEN METABOLIC PANEL: CPT

## 2025-03-31 ENCOUNTER — RESULTS FOLLOW-UP (OUTPATIENT)
Dept: INTERNAL MEDICINE | Age: 60
End: 2025-03-31

## 2025-04-01 ENCOUNTER — OFFICE VISIT (OUTPATIENT)
Dept: INTERNAL MEDICINE | Age: 60
End: 2025-04-01
Payer: COMMERCIAL

## 2025-04-01 VITALS
HEIGHT: 64 IN | OXYGEN SATURATION: 95 % | SYSTOLIC BLOOD PRESSURE: 120 MMHG | RESPIRATION RATE: 16 BRPM | WEIGHT: 181 LBS | BODY MASS INDEX: 30.9 KG/M2 | HEART RATE: 78 BPM | DIASTOLIC BLOOD PRESSURE: 72 MMHG

## 2025-04-01 DIAGNOSIS — E55.9 VITAMIN D DEFICIENCY: ICD-10-CM

## 2025-04-01 DIAGNOSIS — Z12.39 ENCOUNTER FOR SCREENING FOR MALIGNANT NEOPLASM OF BREAST, UNSPECIFIED SCREENING MODALITY: ICD-10-CM

## 2025-04-01 DIAGNOSIS — E78.1 HYPERTRIGLYCERIDEMIA: Primary | ICD-10-CM

## 2025-04-01 DIAGNOSIS — K21.9 GASTROESOPHAGEAL REFLUX DISEASE WITHOUT ESOPHAGITIS: ICD-10-CM

## 2025-04-01 PROCEDURE — G8417 CALC BMI ABV UP PARAM F/U: HCPCS | Performed by: INTERNAL MEDICINE

## 2025-04-01 PROCEDURE — 99214 OFFICE O/P EST MOD 30 MIN: CPT | Performed by: INTERNAL MEDICINE

## 2025-04-01 PROCEDURE — 1036F TOBACCO NON-USER: CPT | Performed by: INTERNAL MEDICINE

## 2025-04-01 PROCEDURE — 3017F COLORECTAL CA SCREEN DOC REV: CPT | Performed by: INTERNAL MEDICINE

## 2025-04-01 PROCEDURE — G8427 DOCREV CUR MEDS BY ELIG CLIN: HCPCS | Performed by: INTERNAL MEDICINE

## 2025-04-01 RX ORDER — TERBINAFINE HYDROCHLORIDE 250 MG/1
250 TABLET ORAL DAILY
Qty: 84 TABLET | Refills: 0 | Status: SHIPPED | OUTPATIENT
Start: 2025-04-01 | End: 2025-06-24

## 2025-04-01 SDOH — ECONOMIC STABILITY: FOOD INSECURITY: WITHIN THE PAST 12 MONTHS, THE FOOD YOU BOUGHT JUST DIDN'T LAST AND YOU DIDN'T HAVE MONEY TO GET MORE.: NEVER TRUE

## 2025-04-01 SDOH — ECONOMIC STABILITY: FOOD INSECURITY: WITHIN THE PAST 12 MONTHS, YOU WORRIED THAT YOUR FOOD WOULD RUN OUT BEFORE YOU GOT MONEY TO BUY MORE.: NEVER TRUE

## 2025-04-01 ASSESSMENT — PATIENT HEALTH QUESTIONNAIRE - PHQ9
7. TROUBLE CONCENTRATING ON THINGS, SUCH AS READING THE NEWSPAPER OR WATCHING TELEVISION: NOT AT ALL
3. TROUBLE FALLING OR STAYING ASLEEP: NOT AT ALL
9. THOUGHTS THAT YOU WOULD BE BETTER OFF DEAD, OR OF HURTING YOURSELF: NOT AT ALL
6. FEELING BAD ABOUT YOURSELF - OR THAT YOU ARE A FAILURE OR HAVE LET YOURSELF OR YOUR FAMILY DOWN: NOT AT ALL
4. FEELING TIRED OR HAVING LITTLE ENERGY: NOT AT ALL
5. POOR APPETITE OR OVEREATING: NOT AT ALL
10. IF YOU CHECKED OFF ANY PROBLEMS, HOW DIFFICULT HAVE THESE PROBLEMS MADE IT FOR YOU TO DO YOUR WORK, TAKE CARE OF THINGS AT HOME, OR GET ALONG WITH OTHER PEOPLE: NOT DIFFICULT AT ALL
DEPRESSION UNABLE TO ASSESS: FUNCTIONAL CAPACITY MOTIVATION LIMITS ACCURACY
SUM OF ALL RESPONSES TO PHQ QUESTIONS 1-9: 0
8. MOVING OR SPEAKING SO SLOWLY THAT OTHER PEOPLE COULD HAVE NOTICED. OR THE OPPOSITE, BEING SO FIGETY OR RESTLESS THAT YOU HAVE BEEN MOVING AROUND A LOT MORE THAN USUAL: NOT AT ALL
SUM OF ALL RESPONSES TO PHQ QUESTIONS 1-9: 0
2. FEELING DOWN, DEPRESSED OR HOPELESS: NOT AT ALL
SUM OF ALL RESPONSES TO PHQ QUESTIONS 1-9: 0
SUM OF ALL RESPONSES TO PHQ QUESTIONS 1-9: 0
1. LITTLE INTEREST OR PLEASURE IN DOING THINGS: NOT AT ALL

## 2025-06-11 NOTE — TELEPHONE ENCOUNTER
Pharmacy requesting a refill of the below medication which has been pended for you:     Requested Prescriptions     Pending Prescriptions Disp Refills    sertraline (ZOLOFT) 100 MG tablet [Pharmacy Med Name: SERTRALINE  MG TABLET] 90 tablet 0     Sig: TAKE 1 TABLET BY MOUTH EVERY DAY       Last Appointment Date: 4/1/25  Next Appointment Date: 10/7/25      Allergies   Allergen Reactions    Doxycycline      Urticaria      Aspirin Rash    Penicillins Swelling and Rash

## 2025-06-12 RX ORDER — SERTRALINE HYDROCHLORIDE 100 MG/1
100 TABLET, FILM COATED ORAL DAILY
Qty: 90 TABLET | Refills: 0 | Status: SHIPPED | OUTPATIENT
Start: 2025-06-12

## 2025-07-14 NOTE — PROGRESS NOTES
[facility]  [facility]  [address] [facility] [address]  Phone: [phone]  Fax: [phone]    Nursing Intake GYN Preventative History Information  10/8/24            Last Pap Smear Date and Results:   Date: 04/25/2023  TZ present: Yes  Cytology Result: Neg  HPV Result: Neg   LEEP History: Yes: Path Unknown  Date: Unknown at this time      2.  Last Mammogram Date and Results:   Date: 07/15/2024  Results: Birads 1  Other: Mother(50), Mat GM, Mat Aunt, Mat Great Aunts x2 with breast cancer.  Tyrer Cuzick Score: 21.5%        3.  Last Colonoscopy Date and Results:   Date: 04/17/2024  Results: Repeat in 10 years.  Component  Ref Range & Units (hover) 4/17/24 0856   Surgical Pathology Report Path Number: [id number]    -- Diagnosis --  A.  Colon, cecum, colonoscopic polypectomy:  Normal colonic mucosa with focal mucosal lymphoid aggregate.    G.  Rectum, colonoscopic polypectomy:  Hyperplastic polyp.         4.  Last Bone Density Date and Results:   Date: 06/27/2023  Results: Osteopenia      Electronically signed by Dianne Marcano LPN on 7/14/2025 at 2:04 PM

## 2025-07-16 ENCOUNTER — OFFICE VISIT (OUTPATIENT)
Dept: OBGYN | Age: 60
End: 2025-07-16
Payer: COMMERCIAL

## 2025-07-16 ENCOUNTER — HOSPITAL ENCOUNTER (OUTPATIENT)
Dept: MAMMOGRAPHY | Age: 60
Discharge: HOME | End: 2025-07-18
Attending: INTERNAL MEDICINE
Payer: COMMERCIAL

## 2025-07-16 VITALS
HEIGHT: 63 IN | BODY MASS INDEX: 32.87 KG/M2 | SYSTOLIC BLOOD PRESSURE: 126 MMHG | WEIGHT: 185.5 LBS | DIASTOLIC BLOOD PRESSURE: 86 MMHG | HEART RATE: 77 BPM | OXYGEN SATURATION: 98 %

## 2025-07-16 VITALS — WEIGHT: 185 LBS | HEIGHT: 64 IN | BODY MASS INDEX: 31.58 KG/M2

## 2025-07-16 DIAGNOSIS — Z91.89 INCREASED RISK OF BREAST CANCER: ICD-10-CM

## 2025-07-16 DIAGNOSIS — Z01.419 WELL WOMAN EXAM WITH ROUTINE GYNECOLOGICAL EXAM: Primary | ICD-10-CM

## 2025-07-16 DIAGNOSIS — Z12.39 ENCOUNTER FOR SCREENING FOR MALIGNANT NEOPLASM OF BREAST, UNSPECIFIED SCREENING MODALITY: ICD-10-CM

## 2025-07-16 DIAGNOSIS — Z98.890 S/P CONIZATION OF CERVIX: ICD-10-CM

## 2025-07-16 PROCEDURE — 77063 BREAST TOMOSYNTHESIS BI: CPT

## 2025-07-16 PROCEDURE — 99396 PREV VISIT EST AGE 40-64: CPT | Performed by: OBSTETRICS & GYNECOLOGY

## 2025-07-16 RX ORDER — FLUOCINONIDE 0.5 MG/G
CREAM TOPICAL
COMMUNITY
Start: 2025-07-15

## 2025-07-16 NOTE — PROGRESS NOTES
History and Physical  [facility]  [facility]  [address] [facility] [address]  Phone: [phone]  Fax: [phone]    Sherrie Yeboah  [birth year]25              60 y.o.  Chief Complaint   Patient presents with    Annual Exam       No LMP recorded. Patient has had an ablation.             Primary Care Physician: Annalee Escobar MD    The patient was seen and examined. She has no chief complaint today and is here for her annual exam.  Her bowels are regular. There are no voiding complaints. She denies any bloating.  She denies vaginal discharge and was counseled on STD's and the need for barrier contraception.     HPI : Sherrie Yeboah is a 60 y.o. female     NO cc Annual  no Bloating  no Early Satiety  no Unexplained weight change of more than 15 lbs  no  PMB  no  PCB  ________________________________________________________________________  OB History    Para Term  AB Living   4 3 3 0 1 3   SAB IAB Ectopic Molar Multiple Live Births   1 0 0 0 0 3      # Outcome Date GA Lbr Valentin/2nd Weight Sex Type Anes PTL Lv   4 Term 1993    M    TEJA   3 SAB 1992           2 Term 1988    F    TEJA   1 Term 1985    M    TEJA     Past Medical History:   Diagnosis Date    Allergic rhinitis     Anemia     Anxiety     Originally , with panic    Arthritis     Chronic tension headaches     occasional    Colon cancer screening 2024    Depression     Zoloft started     Depression     DUB (dysfunctional uterine bleeding)     ablation    Fatigue     GERD (gastroesophageal reflux disease)     No prior scope as of PE 16- mild sx over yrs managed without meds    Hepatitis A 1969    age 4    Internal hemorrhoids     on scope     Lateral epicondylitis 2014    resolved    Low back pain     YOJANA on CPAP     sleep study  CPAP 12    Other disorder of menstruation and other abnormal bleeding from female genital tract 2013    S/P excision of

## 2025-07-21 DIAGNOSIS — M25.532 LEFT WRIST PAIN: Primary | ICD-10-CM

## 2025-08-05 ENCOUNTER — HOSPITAL ENCOUNTER (OUTPATIENT)
Dept: GENERAL RADIOLOGY | Age: 60
Discharge: HOME | End: 2025-08-07
Payer: COMMERCIAL

## 2025-08-05 ENCOUNTER — OFFICE VISIT (OUTPATIENT)
Dept: ORTHOPEDIC SURGERY | Age: 60
End: 2025-08-05
Payer: COMMERCIAL

## 2025-08-05 VITALS
DIASTOLIC BLOOD PRESSURE: 92 MMHG | HEIGHT: 64 IN | HEART RATE: 75 BPM | SYSTOLIC BLOOD PRESSURE: 150 MMHG | BODY MASS INDEX: 31.58 KG/M2 | WEIGHT: 185 LBS

## 2025-08-05 DIAGNOSIS — M25.532 LEFT WRIST PAIN: ICD-10-CM

## 2025-08-05 DIAGNOSIS — M25.532 LEFT WRIST PAIN: Primary | ICD-10-CM

## 2025-08-05 DIAGNOSIS — M19.032 LOCALIZED PRIMARY OSTEOARTHRITIS OF CARPOMETACARPAL (CMC) JOINT OF LEFT WRIST: ICD-10-CM

## 2025-08-05 PROCEDURE — G8427 DOCREV CUR MEDS BY ELIG CLIN: HCPCS | Performed by: NURSE PRACTITIONER

## 2025-08-05 PROCEDURE — G8417 CALC BMI ABV UP PARAM F/U: HCPCS | Performed by: NURSE PRACTITIONER

## 2025-08-05 PROCEDURE — 3017F COLORECTAL CA SCREEN DOC REV: CPT | Performed by: NURSE PRACTITIONER

## 2025-08-05 PROCEDURE — 99213 OFFICE O/P EST LOW 20 MIN: CPT | Performed by: NURSE PRACTITIONER

## 2025-08-05 PROCEDURE — 1036F TOBACCO NON-USER: CPT | Performed by: NURSE PRACTITIONER

## 2025-08-05 PROCEDURE — 73110 X-RAY EXAM OF WRIST: CPT

## (undated) DEVICE — 1200CC GUARDIAN II: Brand: GUARDIAN

## (undated) DEVICE — GLOVE SURG SZ 85 L12IN FNGR THK13MIL WHT ISOLEX POLYISOPRENE

## (undated) DEVICE — BANDAGE COMPR W2INXL5YD TAN BRTH SELF ADH WRP W/ HND TEAR

## (undated) DEVICE — FORCEPS BX L240CM JAW DIA2.2MM RAD JAW 4 HOT DISP

## (undated) DEVICE — 4-PORT MANIFOLD: Brand: NEPTUNE 2

## (undated) DEVICE — GOWN,AURORA,NON-REINFORCED,2XL: Brand: MEDLINE

## (undated) DEVICE — SCRUB DRY SURG EZ SCRUB BRUSH PREOPERATIVE GRN

## (undated) DEVICE — COVER LT HNDL BLU PLAS

## (undated) DEVICE — SKIN AFFIX SURG ADHESIVE 72/CS 0.55ML: Brand: MEDLINE

## (undated) DEVICE — GAUZE,SPONGE,4"X4",12PLY,STERILE,LF,2'S: Brand: MEDLINE

## (undated) DEVICE — SOLUTION IV IRRIG POUR BRL 0.9% SODIUM CHL 2F7124

## (undated) DEVICE — SUTURE ETHLN SZ 3-0 L18IN NONABSORBABLE BLK FS-1 L24MM 3/8 663H

## (undated) DEVICE — DRAPE,U/ SHT,SPLIT,PLAS,STERIL: Brand: MEDLINE

## (undated) DEVICE — MERCY DEFIANCE ENDO KIT: Brand: MEDLINE INDUSTRIES, INC.

## (undated) DEVICE — PACK SURG PROC REMINDER N WVN DISPOSABLE BEAC TIME OUT

## (undated) DEVICE — PAD, GROUNDING, UNIVERSAL, SPLIT, 9': Brand: MEDLINE

## (undated) DEVICE — CO2 CANNULA,SSOFT,ADLT,7O2,4CO2,FEMALE: Brand: MEDLINE

## (undated) DEVICE — BANDAGE ELASTIC SELF-CLS EZE BND 3INX5YD

## (undated) DEVICE — CHLORAPREP 26ML ORANGE

## (undated) DEVICE — BANDAGE GZ W2XL75IN ST RAYON POLY CNFRM STRTCH LTWT

## (undated) DEVICE — PACK PROCEDURE SURG EXTREMITY MIN

## (undated) DEVICE — INTENDED FOR TISSUE SEPARATION, AND OTHER PROCEDURES THAT REQUIRE A SHARP SURGICAL BLADE TO PUNCTURE OR CUT.: Brand: BARD-PARKER ® CARBON RIB-BACK BLADES